# Patient Record
Sex: FEMALE | Race: WHITE | Employment: UNEMPLOYED | URBAN - METROPOLITAN AREA
[De-identification: names, ages, dates, MRNs, and addresses within clinical notes are randomized per-mention and may not be internally consistent; named-entity substitution may affect disease eponyms.]

---

## 2017-09-13 ENCOUNTER — TELEPHONE (OUTPATIENT)
Dept: FAMILY MEDICINE CLINIC | Facility: CLINIC | Age: 62
End: 2017-09-13

## 2017-09-13 NOTE — TELEPHONE ENCOUNTER
Received fax from Dr Jacobs Foil office requesting a Pre-Op. Pt having R Internal Ptosis Repair on 9/22/17. .. Lorin Tsai  pt has scheduled her appt w/Dr Cheng Corporal on 9/18/17 (EKG requested) Orders in [address] [place] [address]

## 2017-09-15 ENCOUNTER — TELEPHONE (OUTPATIENT)
Dept: FAMILY MEDICINE CLINIC | Facility: CLINIC | Age: 62
End: 2017-09-15

## 2017-09-15 NOTE — TELEPHONE ENCOUNTER
UMAIR for patient to confirm her appt with Dr. Colton Peterson on 9/18/17 at 10 am for her Pre-op Physical/New Patient.

## 2017-09-18 ENCOUNTER — OFFICE VISIT (OUTPATIENT)
Dept: FAMILY MEDICINE CLINIC | Facility: CLINIC | Age: 62
End: 2017-09-18

## 2017-09-18 VITALS
OXYGEN SATURATION: 99 % | RESPIRATION RATE: 16 BRPM | TEMPERATURE: 99 F | DIASTOLIC BLOOD PRESSURE: 62 MMHG | HEIGHT: 68.75 IN | SYSTOLIC BLOOD PRESSURE: 120 MMHG | WEIGHT: 152 LBS | BODY MASS INDEX: 22.51 KG/M2 | HEART RATE: 74 BPM

## 2017-09-18 DIAGNOSIS — I47.1 PAROXYSMAL SVT (SUPRAVENTRICULAR TACHYCARDIA) (HCC): ICD-10-CM

## 2017-09-18 DIAGNOSIS — Z01.818 PREOP EXAMINATION: ICD-10-CM

## 2017-09-18 DIAGNOSIS — I10 ESSENTIAL HYPERTENSION: ICD-10-CM

## 2017-09-18 DIAGNOSIS — G62.9 NEUROPATHY: ICD-10-CM

## 2017-09-18 DIAGNOSIS — E78.2 MIXED HYPERLIPIDEMIA: ICD-10-CM

## 2017-09-18 DIAGNOSIS — H02.401 PTOSIS OF RIGHT EYELID: Primary | ICD-10-CM

## 2017-09-18 PROCEDURE — 93000 ELECTROCARDIOGRAM COMPLETE: CPT | Performed by: FAMILY MEDICINE

## 2017-09-18 PROCEDURE — 99243 OFF/OP CNSLTJ NEW/EST LOW 30: CPT | Performed by: FAMILY MEDICINE

## 2017-09-18 RX ORDER — DILTIAZEM HYDROCHLORIDE 180 MG/1
180 CAPSULE, COATED, EXTENDED RELEASE ORAL 2 TIMES DAILY
Refills: 3 | COMMUNITY
Start: 2017-09-10 | End: 2018-07-02

## 2017-09-18 RX ORDER — MELATONIN
2 2 TIMES DAILY
COMMUNITY
End: 2018-07-02 | Stop reason: ALTCHOICE

## 2017-09-18 RX ORDER — LORATADINE 10 MG/1
10 TABLET ORAL DAILY PRN
COMMUNITY
End: 2019-03-21

## 2017-09-18 RX ORDER — CITALOPRAM 20 MG/1
20 TABLET ORAL DAILY
Refills: 0 | COMMUNITY
Start: 2017-09-10 | End: 2017-12-26

## 2017-09-18 RX ORDER — DOXEPIN HYDROCHLORIDE 50 MG/1
1 CAPSULE ORAL DAILY
COMMUNITY

## 2017-09-18 RX ORDER — VALSARTAN 160 MG/1
160 TABLET ORAL DAILY
Refills: 2 | COMMUNITY
Start: 2017-09-10 | End: 2018-07-02

## 2017-09-18 RX ORDER — CHOLECALCIFEROL (VITAMIN D3) 125 MCG
CAPSULE ORAL
COMMUNITY
End: 2019-01-03

## 2017-09-18 NOTE — PROGRESS NOTES
Payor: SHIRLEY BAILEY PPO / Plan: SHIRLEY PPO / Product Type: PPO /     Subjective:  HPI:  64year old female who has a past medical history of HLD (hyperlipidemia); HTN (hypertension); Neuropathy ([facility] [address] and Paroxysmal SVT (supraventricular tachycardia) ([facility] [address]  oliver no  14. Do you suffer from asthma? no  15. Do you have diabetes that requires insulin? no  16. Do you have diabetes that requires tablets only? no  17.  Do you suffer from bronchitis? no    Functional status:   Can do heavy work around the house such as scr 9/18/1987  Smokeless tobacco: Never Used                      Alcohol use: Yes           12.0 oz/week     Standard drinks or equivalent: 10, Glasses of wine: 10 per week    OBJECTIVE:     09/18/17  1009   BP: 120/62   Pulse: 74   Resp: 16   Temp: 99 °F (37 status this is likely an acceptable risk. If the surgical team believes the anticipated benefit of the procedure outweighs the risks, then surgery may proceed with our above medical recommendations. Patient is an acceptable surgical candidate.     Rose Dixon

## 2017-10-26 ENCOUNTER — OFFICE VISIT (OUTPATIENT)
Dept: FAMILY MEDICINE CLINIC | Facility: CLINIC | Age: 62
End: 2017-10-26

## 2017-10-26 VITALS
TEMPERATURE: 99 F | RESPIRATION RATE: 16 BRPM | WEIGHT: 154.38 LBS | HEIGHT: 68.6 IN | BODY MASS INDEX: 23.13 KG/M2 | DIASTOLIC BLOOD PRESSURE: 60 MMHG | SYSTOLIC BLOOD PRESSURE: 122 MMHG | HEART RATE: 81 BPM

## 2017-10-26 DIAGNOSIS — Z11.51 SCREENING FOR HPV (HUMAN PAPILLOMAVIRUS): ICD-10-CM

## 2017-10-26 DIAGNOSIS — Z01.419 WELL WOMAN EXAM WITH ROUTINE GYNECOLOGICAL EXAM: Primary | ICD-10-CM

## 2017-10-26 DIAGNOSIS — Z23 NEED FOR VACCINATION: ICD-10-CM

## 2017-10-26 DIAGNOSIS — Z12.31 VISIT FOR SCREENING MAMMOGRAM: ICD-10-CM

## 2017-10-26 DIAGNOSIS — Z11.59 ENCOUNTER FOR HEPATITIS C SCREENING TEST FOR LOW RISK PATIENT: ICD-10-CM

## 2017-10-26 DIAGNOSIS — Z12.4 CERVICAL CANCER SCREENING: ICD-10-CM

## 2017-10-26 PROCEDURE — 88175 CYTOPATH C/V AUTO FLUID REDO: CPT | Performed by: FAMILY MEDICINE

## 2017-10-26 PROCEDURE — 87625 HPV TYPES 16 & 18 ONLY: CPT | Performed by: FAMILY MEDICINE

## 2017-10-26 PROCEDURE — 90686 IIV4 VACC NO PRSV 0.5 ML IM: CPT | Performed by: FAMILY MEDICINE

## 2017-10-26 PROCEDURE — 90471 IMMUNIZATION ADMIN: CPT | Performed by: FAMILY MEDICINE

## 2017-10-26 PROCEDURE — 87624 HPV HI-RISK TYP POOLED RSLT: CPT | Performed by: FAMILY MEDICINE

## 2017-10-26 PROCEDURE — 99396 PREV VISIT EST AGE 40-64: CPT | Performed by: FAMILY MEDICINE

## 2017-10-26 NOTE — PROGRESS NOTES
SUBJECTIVE:  Patient presents with:  Physical: WWE with Pap   Imm/Inj: woulf like Flu shot    HPI:  No concerns today    Health Maintenance:  Vaccines: reviewed as below. Indicated today: Influenza, Tdap  Had zostavax last year  Obesity screening:  Body m Swelling    Comment:Other reaction(s): Other (See Comments)             Severe joint pain             Other reaction(s): Swelling  Beta Adrenergic Blo*    Hives    Comment:Atenolo, Toprol.   Lisinopril              Coughing  Penicillins             Hives  D DIFFERENTIAL    COMP METABOLIC PANEL (14)    LIPID PANEL    THINPREP PAP SMEAR B    HPV HIGH RISK , THIN PREP COLLECTION    Need for vaccination        Relevant Orders    IMMUNIZATION ADMINISTRATION    TETANUS, DIPHTHERIA TOXOIDS AND ACELLULAR PERTUSIS VAC

## 2017-10-30 DIAGNOSIS — R87.610 ASCUS WITH POSITIVE HIGH RISK HPV CERVICAL: Primary | ICD-10-CM

## 2017-10-30 DIAGNOSIS — R87.810 ASCUS WITH POSITIVE HIGH RISK HPV CERVICAL: Primary | ICD-10-CM

## 2017-10-31 ENCOUNTER — TELEPHONE (OUTPATIENT)
Dept: OBGYN CLINIC | Facility: CLINIC | Age: 62
End: 2017-10-31

## 2017-11-01 NOTE — TELEPHONE ENCOUNTER
Spoke with Pt and she did not originally call our office.   She was unaware of the abnormal Pap    I advised Pt to contact Primary for Pap Results and then give our office a call back

## 2017-11-07 ENCOUNTER — TELEPHONE (OUTPATIENT)
Dept: FAMILY MEDICINE CLINIC | Facility: CLINIC | Age: 62
End: 2017-11-07

## 2017-11-07 NOTE — TELEPHONE ENCOUNTER
Pt requesting to order her Mammogram but she usually gets a 3 D Mammogram.Can we re-enter?  Contact pt when done

## 2017-11-09 ENCOUNTER — TELEPHONE (OUTPATIENT)
Dept: FAMILY MEDICINE CLINIC | Facility: CLINIC | Age: 62
End: 2017-11-09

## 2017-11-09 DIAGNOSIS — R92.2 DENSE BREAST TISSUE: Primary | ICD-10-CM

## 2017-11-09 DIAGNOSIS — Z12.31 SCREENING MAMMOGRAM, ENCOUNTER FOR: ICD-10-CM

## 2017-11-09 NOTE — TELEPHONE ENCOUNTER
Patient is calling in she said that she also needs the 3D mammogram ordered. Her previous doctor told her that, she is new to the area. Please place orders or call her back with questions.

## 2017-11-10 NOTE — TELEPHONE ENCOUNTER
Please let patient know this may not be covered by insurance. Order placed. Please let me know if you have any questions.   Isra Galo DO 11/10/2017 6:23 AM

## 2017-11-14 ENCOUNTER — HOSPITAL ENCOUNTER (OUTPATIENT)
Dept: MAMMOGRAPHY | Facility: HOSPITAL | Age: 62
Discharge: HOME | End: 2017-11-14
Attending: FAMILY MEDICINE
Payer: COMMERCIAL

## 2017-11-14 DIAGNOSIS — R92.2 DENSE BREAST TISSUE: ICD-10-CM

## 2017-11-14 DIAGNOSIS — Z12.31 SCREENING MAMMOGRAM, ENCOUNTER FOR: ICD-10-CM

## 2017-11-14 PROCEDURE — 77063 BREAST TOMOSYNTHESIS BI: CPT | Performed by: FAMILY MEDICINE

## 2017-11-14 PROCEDURE — 77067 SCR MAMMO BI INCL CAD: CPT | Performed by: FAMILY MEDICINE

## 2017-11-15 ENCOUNTER — APPOINTMENT (OUTPATIENT)
Dept: LAB | Facility: HOSPITAL | Age: 62
End: 2017-11-15
Attending: FAMILY MEDICINE
Payer: COMMERCIAL

## 2017-11-15 DIAGNOSIS — Z11.59 ENCOUNTER FOR HEPATITIS C SCREENING TEST FOR LOW RISK PATIENT: ICD-10-CM

## 2017-11-15 DIAGNOSIS — Z01.419 WELL WOMAN EXAM WITH ROUTINE GYNECOLOGICAL EXAM: ICD-10-CM

## 2017-11-15 PROCEDURE — 86803 HEPATITIS C AB TEST: CPT

## 2017-11-15 PROCEDURE — 80061 LIPID PANEL: CPT

## 2017-11-15 PROCEDURE — 36415 COLL VENOUS BLD VENIPUNCTURE: CPT

## 2017-11-15 PROCEDURE — 80053 COMPREHEN METABOLIC PANEL: CPT

## 2017-11-15 PROCEDURE — 85027 COMPLETE CBC AUTOMATED: CPT

## 2017-11-17 ENCOUNTER — OFFICE VISIT (OUTPATIENT)
Dept: OBGYN CLINIC | Facility: CLINIC | Age: 62
End: 2017-11-17

## 2017-11-17 VITALS
HEIGHT: 69 IN | SYSTOLIC BLOOD PRESSURE: 134 MMHG | WEIGHT: 154 LBS | DIASTOLIC BLOOD PRESSURE: 72 MMHG | HEART RATE: 104 BPM | BODY MASS INDEX: 22.81 KG/M2

## 2017-11-17 DIAGNOSIS — R87.810 ASCUS WITH POSITIVE HIGH RISK HPV CERVICAL: Primary | ICD-10-CM

## 2017-11-17 DIAGNOSIS — R87.610 ASCUS WITH POSITIVE HIGH RISK HPV CERVICAL: Primary | ICD-10-CM

## 2017-11-17 PROCEDURE — 88342 IMHCHEM/IMCYTCHM 1ST ANTB: CPT | Performed by: OBSTETRICS & GYNECOLOGY

## 2017-11-17 PROCEDURE — 88305 TISSUE EXAM BY PATHOLOGIST: CPT | Performed by: OBSTETRICS & GYNECOLOGY

## 2017-11-17 PROCEDURE — 57454 BX/CURETT OF CERVIX W/SCOPE: CPT | Performed by: OBSTETRICS & GYNECOLOGY

## 2017-11-17 NOTE — PROGRESS NOTES
Patient presents for scheduled colposcopy 2/2 ASCUS with positive HPV noted on pap smear on 10/26/17. The patient reports a history of abnormal pap smear 20 years ago abnormal but EMB normal per patient.  Discussion held with the patient regarding pap smear

## 2017-11-17 NOTE — PATIENT INSTRUCTIONS
[facility] Department of OB/GYN  After Care Instructions for Colposcopy/Biopsy      Biopsy Results   You will receive a phone call with your biopsy results in 7 business days. If you have not received your biopsy results in 7 days, please contact our office.   Jennifer Moore

## 2017-11-19 PROBLEM — R87.810 ASCUS WITH POSITIVE HIGH RISK HPV CERVICAL: Status: ACTIVE | Noted: 2017-11-19

## 2017-11-19 PROBLEM — R87.610 ASCUS WITH POSITIVE HIGH RISK HPV CERVICAL: Status: ACTIVE | Noted: 2017-11-19

## 2017-11-19 NOTE — PROCEDURES
Colposcopy Procedure Note    Indications: 58year old y/o female with ASCUS, HPV positive on pap smear 10/26/17    Pre-procedure diagnosis:   ASCUS, HPV positive     Post-procedure diagnosis:  same    Procedure:  Colposcopy   Cervical Biopsy   ECC     Proc

## 2017-11-21 ENCOUNTER — MED REC SCAN ONLY (OUTPATIENT)
Dept: OBGYN CLINIC | Facility: CLINIC | Age: 62
End: 2017-11-21

## 2017-11-21 ENCOUNTER — TELEPHONE (OUTPATIENT)
Dept: FAMILY MEDICINE CLINIC | Facility: CLINIC | Age: 62
End: 2017-11-21

## 2017-11-21 NOTE — TELEPHONE ENCOUNTER
Left message at home number for patient to call back. We have been trying to contact her regarding her results - 3 tries letter was mailed.

## 2017-11-22 ENCOUNTER — TELEPHONE (OUTPATIENT)
Dept: OBGYN CLINIC | Facility: CLINIC | Age: 62
End: 2017-11-22

## 2017-11-22 ENCOUNTER — OFFICE VISIT (OUTPATIENT)
Dept: OBGYN CLINIC | Facility: CLINIC | Age: 62
End: 2017-11-22

## 2017-11-22 VITALS
DIASTOLIC BLOOD PRESSURE: 72 MMHG | SYSTOLIC BLOOD PRESSURE: 126 MMHG | HEIGHT: 69 IN | WEIGHT: 154 LBS | BODY MASS INDEX: 22.81 KG/M2

## 2017-11-22 DIAGNOSIS — R87.810 ASCUS WITH POSITIVE HIGH RISK HPV CERVICAL: Primary | ICD-10-CM

## 2017-11-22 DIAGNOSIS — R87.610 ASCUS WITH POSITIVE HIGH RISK HPV CERVICAL: Primary | ICD-10-CM

## 2017-11-22 PROCEDURE — 99213 OFFICE O/P EST LOW 20 MIN: CPT | Performed by: OBSTETRICS & GYNECOLOGY

## 2017-11-22 NOTE — PATIENT INSTRUCTIONS
Please return in one year for your annual gyne visit or sooner if having abnormal vaginal bleeding or severe pelvic pain

## 2017-11-22 NOTE — PROGRESS NOTES
[facility]  Obstetrics and Gynecology  Follow Up Progress Note    Subjective:      Maximiliano Lucio is a 58year old [facility] female who was last seen in office on 11/17/17 and now s/p colposcopy with ECC and cervical biopsy 2/2 ASCUS with positiv possible recommendation for repeat pap smear with co-testing in year or possible LEEP/exicision procedure   - advised noting in vagina for 1 week, may resume intercourse in 2 weeks   Health maintenance   - pt seen by her PCP   - lab results reviewed with dakota

## 2017-11-23 NOTE — TELEPHONE ENCOUNTER
Patient seen in office for follow up and pathology not yet reported  Pathology now resulted as BITA I follow colposcopy procedure   Patient called and notified of results.    Recommend to return to the office in 1 year for follow up with repeat pap smear and

## 2017-12-01 ENCOUNTER — HOSPITAL ENCOUNTER (OUTPATIENT)
Dept: MAMMOGRAPHY | Facility: HOSPITAL | Age: 62
Discharge: HOME | End: 2017-12-01
Attending: FAMILY MEDICINE
Payer: COMMERCIAL

## 2017-12-01 DIAGNOSIS — R92.8 ABNORMAL MAMMOGRAM OF LEFT BREAST: ICD-10-CM

## 2017-12-01 PROCEDURE — 77061 BREAST TOMOSYNTHESIS UNI: CPT | Performed by: FAMILY MEDICINE

## 2017-12-01 PROCEDURE — 77065 DX MAMMO INCL CAD UNI: CPT | Performed by: FAMILY MEDICINE

## 2017-12-01 PROCEDURE — 76641 ULTRASOUND BREAST COMPLETE: CPT | Performed by: FAMILY MEDICINE

## 2017-12-26 NOTE — TELEPHONE ENCOUNTER
LOV 10/26/2017     No future appointments. Refill request for:      Pending Prescriptions Disp Refills    CITALOPRAM HYDROBROMIDE 20 MG Oral Tab [Pharmacy Med Name: CITALOPRAM 20MG TABLETS] 90 tablet 0     Sig: TAKE 1 TABLET BY MOUTH DAILY.  NEEDS NEW P

## 2017-12-27 RX ORDER — CITALOPRAM 20 MG/1
20 TABLET ORAL DAILY
Qty: 90 TABLET | Refills: 1 | Status: SHIPPED | OUTPATIENT
Start: 2017-12-27 | End: 2018-07-02

## 2018-06-05 ENCOUNTER — CHARTING TRANS (OUTPATIENT)
Dept: OTHER | Age: 63
End: 2018-06-05

## 2018-06-22 NOTE — TELEPHONE ENCOUNTER
Failed protocol checks LOV 10/26/2017 with Dr Olmos Likes was to return in 6 months for recheck will have front staff call for an appointment

## 2018-06-29 RX ORDER — DILTIAZEM HYDROCHLORIDE 180 MG/1
CAPSULE, COATED, EXTENDED RELEASE ORAL
Qty: 180 CAPSULE | Refills: 0 | OUTPATIENT
Start: 2018-06-29

## 2018-06-29 RX ORDER — CITALOPRAM 20 MG/1
TABLET ORAL
Qty: 90 TABLET | Refills: 0 | OUTPATIENT
Start: 2018-06-29

## 2018-06-29 RX ORDER — VALSARTAN 160 MG/1
TABLET ORAL
Qty: 90 TABLET | Refills: 0 | OUTPATIENT
Start: 2018-06-29

## 2018-06-29 NOTE — TELEPHONE ENCOUNTER
Patient seems to have moved to [facility], refills will be denied   Seeing a cardiologist in [facility] June 2018

## 2018-07-02 ENCOUNTER — OFFICE VISIT (OUTPATIENT)
Dept: FAMILY MEDICINE CLINIC | Facility: CLINIC | Age: 63
End: 2018-07-02

## 2018-07-02 VITALS
HEIGHT: 69 IN | TEMPERATURE: 99 F | SYSTOLIC BLOOD PRESSURE: 138 MMHG | HEART RATE: 64 BPM | DIASTOLIC BLOOD PRESSURE: 80 MMHG | RESPIRATION RATE: 12 BRPM | BODY MASS INDEX: 23.4 KG/M2 | WEIGHT: 158 LBS

## 2018-07-02 DIAGNOSIS — Z00.00 ROUTINE HEALTH MAINTENANCE: ICD-10-CM

## 2018-07-02 DIAGNOSIS — I10 ESSENTIAL HYPERTENSION: ICD-10-CM

## 2018-07-02 DIAGNOSIS — I47.1 PAROXYSMAL SVT (SUPRAVENTRICULAR TACHYCARDIA) (HCC): Primary | ICD-10-CM

## 2018-07-02 DIAGNOSIS — N95.1 MENOPAUSAL AND FEMALE CLIMACTERIC STATES: ICD-10-CM

## 2018-07-02 PROCEDURE — 99214 OFFICE O/P EST MOD 30 MIN: CPT | Performed by: FAMILY MEDICINE

## 2018-07-02 RX ORDER — VALSARTAN 160 MG/1
160 TABLET ORAL DAILY
Qty: 90 TABLET | Refills: 1 | Status: SHIPPED | OUTPATIENT
Start: 2018-07-02 | End: 2018-08-14

## 2018-07-02 RX ORDER — CITALOPRAM 20 MG/1
20 TABLET ORAL DAILY
Qty: 90 TABLET | Refills: 1 | Status: SHIPPED | OUTPATIENT
Start: 2018-07-02 | End: 2019-01-07

## 2018-07-02 RX ORDER — DILTIAZEM HYDROCHLORIDE 180 MG/1
180 CAPSULE, COATED, EXTENDED RELEASE ORAL 2 TIMES DAILY
Qty: 180 CAPSULE | Refills: 1 | Status: SHIPPED | OUTPATIENT
Start: 2018-07-02 | End: 2019-01-02

## 2018-07-02 NOTE — PROGRESS NOTES
Chief Complaint:  Patient presents with:  Medication Follow-Up    HPI:  This is a 58year old female patient presenting for Medication Follow-Up    HTN: Was on atenolol and metoprolol developed hives  Now on valsartan and diltiazem  Also notes a history of drinks or equivalent: 10, Glasses of wine: 10 per week         Current Outpatient Prescriptions:  DilTIAZem HCl ER Coated Beads 180 MG Oral Capsule SR 24 Hr Take 1 capsule (180 mg total) by mouth 2 (two) times daily.  Disp: 180 capsule Rfl: 1   valsartan 16 This Visit        Cardiovascular    HTN (hypertension)    Relevant Medications    DilTIAZem HCl ER Coated Beads 180 MG Oral Capsule SR 24 Hr    valsartan 160 MG Oral Tab    Paroxysmal SVT (supraventricular tachycardia) (HCC) - Primary    Relevant Medicatio

## 2018-07-03 ENCOUNTER — APPOINTMENT (OUTPATIENT)
Dept: LAB | Facility: HOSPITAL | Age: 63
End: 2018-07-03
Attending: FAMILY MEDICINE
Payer: COMMERCIAL

## 2018-07-03 DIAGNOSIS — Z00.00 ROUTINE HEALTH MAINTENANCE: ICD-10-CM

## 2018-07-03 LAB
ALBUMIN SERPL-MCNC: 3.8 G/DL (ref 3.5–4.8)
ALP LIVER SERPL-CCNC: 96 U/L (ref 50–130)
ALT SERPL-CCNC: 24 U/L (ref 14–54)
AST SERPL-CCNC: 16 U/L (ref 15–41)
BILIRUB SERPL-MCNC: 0.5 MG/DL (ref 0.1–2)
BUN BLD-MCNC: 14 MG/DL (ref 8–20)
CALCIUM BLD-MCNC: 9.1 MG/DL (ref 8.3–10.3)
CHLORIDE: 109 MMOL/L (ref 101–111)
CHOLEST SMN-MCNC: 204 MG/DL (ref ?–200)
CO2: 25 MMOL/L (ref 22–32)
CREAT BLD-MCNC: 0.81 MG/DL (ref 0.55–1.02)
GLUCOSE BLD-MCNC: 90 MG/DL (ref 70–99)
HDLC SERPL-MCNC: 72 MG/DL (ref 45–?)
HDLC SERPL: 2.83 {RATIO} (ref ?–4.44)
LDLC SERPL CALC-MCNC: 120 MG/DL (ref ?–130)
M PROTEIN MFR SERPL ELPH: 7.5 G/DL (ref 6.1–8.3)
NONHDLC SERPL-MCNC: 132 MG/DL (ref ?–130)
POTASSIUM SERPL-SCNC: 4.4 MMOL/L (ref 3.6–5.1)
SODIUM SERPL-SCNC: 140 MMOL/L (ref 136–144)
TRIGL SERPL-MCNC: 58 MG/DL (ref ?–150)
VLDLC SERPL CALC-MCNC: 12 MG/DL (ref 5–40)

## 2018-07-03 PROCEDURE — 36415 COLL VENOUS BLD VENIPUNCTURE: CPT

## 2018-07-03 PROCEDURE — 80061 LIPID PANEL: CPT

## 2018-07-03 PROCEDURE — 80053 COMPREHEN METABOLIC PANEL: CPT

## 2018-07-30 ENCOUNTER — TELEPHONE (OUTPATIENT)
Dept: FAMILY MEDICINE CLINIC | Facility: CLINIC | Age: 63
End: 2018-07-30

## 2018-07-30 DIAGNOSIS — IMO0002 ANTIBIOTIC PROPHYLAXIS FOR DENTAL PROCEDURE INDICATED DUE TO PRIOR JOINT REPLACEMENT: ICD-10-CM

## 2018-07-30 DIAGNOSIS — IMO0002 ANTIBIOTIC PROPHYLAXIS FOR DENTAL PROCEDURE INDICATED DUE TO PRIOR JOINT REPLACEMENT: Primary | ICD-10-CM

## 2018-07-30 RX ORDER — CLINDAMYCIN HYDROCHLORIDE 300 MG/1
300 CAPSULE ORAL 3 TIMES DAILY
Qty: 1 CAPSULE | Refills: 0 | Status: SHIPPED | OUTPATIENT
Start: 2018-07-30 | End: 2018-07-31

## 2018-07-30 NOTE — TELEPHONE ENCOUNTER
Pt is going to dentist today and needs Clindamycin 300mg rx placed to pharmacy to take 1 hour prior to appoinment. Please place rx to [facility] in [place] [address].   Pt can be reached at [phone] Routed to Dr Stephanie Griffin

## 2018-07-30 NOTE — TELEPHONE ENCOUNTER
Please see other telephone note as well. Pt called requesting we fill her prophylactic clindamycin prior to her upcoming dental procedure today. Pharmacy returned call stating that she takes 600mg not 300mg.  This was verbally given as an order over the michele

## 2018-07-31 RX ORDER — CLINDAMYCIN HYDROCHLORIDE 300 MG/1
600 CAPSULE ORAL 3 TIMES DAILY
Qty: 2 CAPSULE | Refills: 0 | COMMUNITY
Start: 2018-07-31 | End: 2019-01-03

## 2018-07-31 NOTE — TELEPHONE ENCOUNTER
Patient reports she is taking prophylactic clinda for her heart murmur. Patient states she asked her cardiologist about a year ago whether she still needed to take it and he told her \"absolutely. \"

## 2018-08-14 ENCOUNTER — TELEPHONE (OUTPATIENT)
Dept: FAMILY MEDICINE CLINIC | Facility: CLINIC | Age: 63
End: 2018-08-14

## 2018-08-14 DIAGNOSIS — I10 ESSENTIAL HYPERTENSION: Primary | ICD-10-CM

## 2018-08-14 RX ORDER — LOSARTAN POTASSIUM 100 MG/1
100 TABLET ORAL DAILY
Qty: 90 TABLET | Refills: 1 | Status: SHIPPED | OUTPATIENT
Start: 2018-08-14 | End: 2019-01-02

## 2018-08-14 NOTE — TELEPHONE ENCOUNTER
Fax received from pharmacy stating that pt is currently taking Valsartan 160 mg daily. There is a 's recall on Valsartan. LOV 7/2/18.  Future Appointments  Date Time Provider Prema Estrella   9/14/2018 10:00 AM Michaela Henderson MD [facility]

## 2018-10-25 ENCOUNTER — OFFICE VISIT (OUTPATIENT)
Dept: OBGYN CLINIC | Facility: CLINIC | Age: 63
End: 2018-10-25
Payer: COMMERCIAL

## 2018-10-25 VITALS
DIASTOLIC BLOOD PRESSURE: 64 MMHG | HEIGHT: 69 IN | WEIGHT: 161 LBS | SYSTOLIC BLOOD PRESSURE: 122 MMHG | BODY MASS INDEX: 23.85 KG/M2

## 2018-10-25 DIAGNOSIS — N87.0 CIN I (CERVICAL INTRAEPITHELIAL NEOPLASIA I): ICD-10-CM

## 2018-10-25 DIAGNOSIS — Z12.39 BREAST CANCER SCREENING: ICD-10-CM

## 2018-10-25 DIAGNOSIS — R87.810 ASCUS WITH POSITIVE HIGH RISK HPV CERVICAL: ICD-10-CM

## 2018-10-25 DIAGNOSIS — R87.610 ASCUS WITH POSITIVE HIGH RISK HPV CERVICAL: ICD-10-CM

## 2018-10-25 DIAGNOSIS — Z01.419 WELL WOMAN EXAM WITH ROUTINE GYNECOLOGICAL EXAM: Primary | ICD-10-CM

## 2018-10-25 DIAGNOSIS — Z12.4 CERVICAL CANCER SCREENING: ICD-10-CM

## 2018-10-25 PROCEDURE — 99396 PREV VISIT EST AGE 40-64: CPT | Performed by: OBSTETRICS & GYNECOLOGY

## 2018-10-25 PROCEDURE — 87624 HPV HI-RISK TYP POOLED RSLT: CPT | Performed by: OBSTETRICS & GYNECOLOGY

## 2018-10-25 NOTE — PROGRESS NOTES
[facility]  Obstetrics and Gynecology  History & Physical    CC: Patient is here for annual well woman exam     Subjective:     HPI: Cassie Connolly is a 61[facility] here for annual well women exam.  Patient reports denies PMB or HIVES    Comment:Atenolo, Toprol. Lisinopril              Coughing  Penicillins             HIVES  Duloxetine Hcl          PALPITATIONS    Comment:Other reaction(s): Other (See Comments)  Simvastatin                 Comment:Other reaction(s):  Altered Ment wine, 10 Standard drinks or equivalent per week      Drug use: No      Sexual activity: Yes        Partners: Male    Other Topics      Concerns:         Service: Not Asked        Blood Transfusions: Not Asked        Caffeine Concern: No        Occu normal appearing vagina with normal color and discharge, no lesions  CERVIX: normal appearing cervix without discharge or lesions  UTERUS: uterus is normal size, shape, consistency and nontender  ADNEXA: normal adnexa in size, nontender and no masses  Ext:

## 2018-10-31 NOTE — PROGRESS NOTES
Hx of BITA I in 2017  Please inform patient of normal repeat pap smear with negative HPV  Advised to f/u in 1 year

## 2018-11-16 PROBLEM — M65.342 TRIGGER RING FINGER OF LEFT HAND: Status: ACTIVE | Noted: 2018-11-16

## 2018-12-04 ENCOUNTER — HOSPITAL ENCOUNTER (OUTPATIENT)
Dept: MAMMOGRAPHY | Facility: HOSPITAL | Age: 63
Discharge: HOME | End: 2018-12-04
Attending: OBSTETRICS & GYNECOLOGY
Payer: COMMERCIAL

## 2018-12-04 DIAGNOSIS — Z12.39 BREAST CANCER SCREENING: ICD-10-CM

## 2018-12-04 PROCEDURE — 77067 SCR MAMMO BI INCL CAD: CPT | Performed by: OBSTETRICS & GYNECOLOGY

## 2018-12-04 PROCEDURE — 77063 BREAST TOMOSYNTHESIS BI: CPT | Performed by: OBSTETRICS & GYNECOLOGY

## 2018-12-26 RX ORDER — CITALOPRAM 20 MG/1
TABLET ORAL
Qty: 90 TABLET | Refills: 0 | Status: SHIPPED | OUTPATIENT
Start: 2018-12-26 | End: 2019-01-07

## 2018-12-26 NOTE — TELEPHONE ENCOUNTER
LOV 7/2/2018     Future Appointments   Date Time Provider Prema Delores   1/7/2019  8:00 AM Daniele Ngo DO [facility]   3/15/2019  8:40 AM MD NABOR Georges   10/3/2019  9:00 AM Gumaro Mac MD [facility]

## 2018-12-29 DIAGNOSIS — N95.1 MENOPAUSAL AND FEMALE CLIMACTERIC STATES: ICD-10-CM

## 2019-01-02 DIAGNOSIS — I10 ESSENTIAL HYPERTENSION: ICD-10-CM

## 2019-01-02 DIAGNOSIS — I47.10 PAROXYSMAL SVT (SUPRAVENTRICULAR TACHYCARDIA): ICD-10-CM

## 2019-01-02 RX ORDER — LOSARTAN POTASSIUM 100 MG/1
TABLET ORAL
Qty: 90 TABLET | Refills: 0 | Status: CANCELLED | OUTPATIENT
Start: 2019-01-02

## 2019-01-02 RX ORDER — DILTIAZEM HYDROCHLORIDE 180 MG/1
CAPSULE, EXTENDED RELEASE ORAL
Qty: 180 CAPSULE | Refills: 0 | Status: CANCELLED | OUTPATIENT
Start: 2019-01-02

## 2019-01-03 RX ORDER — APRACLONIDINE HYDROCHLORIDE 5 MG/ML
SOLUTION/ DROPS OPHTHALMIC
Refills: 0 | COMMUNITY
Start: 2019-01-03 | End: 2019-03-21

## 2019-01-04 RX ORDER — CITALOPRAM 20 MG/1
TABLET ORAL
Qty: 90 TABLET | Refills: 0 | OUTPATIENT
Start: 2019-01-04

## 2019-01-07 ENCOUNTER — OFFICE VISIT (OUTPATIENT)
Dept: FAMILY MEDICINE CLINIC | Facility: CLINIC | Age: 64
End: 2019-01-07
Payer: COMMERCIAL

## 2019-01-07 VITALS
DIASTOLIC BLOOD PRESSURE: 60 MMHG | HEART RATE: 76 BPM | BODY MASS INDEX: 23.7 KG/M2 | RESPIRATION RATE: 16 BRPM | TEMPERATURE: 98 F | SYSTOLIC BLOOD PRESSURE: 108 MMHG | WEIGHT: 160 LBS | HEIGHT: 69 IN

## 2019-01-07 DIAGNOSIS — N95.1 MENOPAUSAL AND FEMALE CLIMACTERIC STATES: Primary | ICD-10-CM

## 2019-01-07 DIAGNOSIS — Z12.11 SCREEN FOR COLON CANCER: ICD-10-CM

## 2019-01-07 PROCEDURE — 99213 OFFICE O/P EST LOW 20 MIN: CPT | Performed by: FAMILY MEDICINE

## 2019-01-07 RX ORDER — CITALOPRAM 20 MG/1
20 TABLET ORAL DAILY
Qty: 90 TABLET | Refills: 1 | Status: SHIPPED | OUTPATIENT
Start: 2019-01-07 | End: 2019-07-08

## 2019-01-07 NOTE — PROGRESS NOTES
Chief Complaint:  Patient presents with:  Depression: Requesting Citalopram Refills      HPI:  This is a 61year old female patient presenting for Depression (Requesting Citalopram Refills)    Notes that she is following with Dr. Luis Felipe Hernandez for cardiology and no Former Smoker        Quit date: 1987        Years since quittin.3      Smokeless tobacco: Never Used    Alcohol use:  Yes      Alcohol/week: 12.0 oz      Types: 10 Glasses of wine, 10 Standard drinks or equivalent per week      Comment: socially peripheral edema   EXTREMITIES: warm and well perfused  PSYCH: pleasant and cooperative, no obvious depression or anxiety    ASSESSMENT AND PLAN:  Discussed the following assessment   Problem List Items Addressed This Visit     None      Visit Diagnoses

## 2019-03-22 ENCOUNTER — APPOINTMENT (OUTPATIENT)
Dept: LAB | Facility: HOSPITAL | Age: 64
End: 2019-03-22
Attending: INTERNAL MEDICINE
Payer: COMMERCIAL

## 2019-03-22 DIAGNOSIS — R14.0 ABDOMINAL BLOATING: ICD-10-CM

## 2019-03-22 DIAGNOSIS — R10.84 GENERALIZED ABDOMINAL PAIN: ICD-10-CM

## 2019-03-22 DIAGNOSIS — R14.3 FLATULENCE: ICD-10-CM

## 2019-03-22 DIAGNOSIS — R19.7 DIARRHEA, UNSPECIFIED TYPE: ICD-10-CM

## 2019-03-22 LAB — IGA SERPL-MCNC: 235 MG/DL (ref 70–312)

## 2019-03-22 PROCEDURE — 82784 ASSAY IGA/IGD/IGG/IGM EACH: CPT

## 2019-03-22 PROCEDURE — 83516 IMMUNOASSAY NONANTIBODY: CPT

## 2019-03-22 PROCEDURE — 36415 COLL VENOUS BLD VENIPUNCTURE: CPT

## 2019-03-25 LAB
TISSUE TRANSGLUTAMINASE AB,IGA: <1.2 U/ML (ref ?–15)
TISSUE TRANSGLUTAMINASE IGA QUALITATIVE: NEGATIVE

## 2019-03-26 RX ORDER — CITALOPRAM 20 MG/1
TABLET ORAL
Qty: 90 TABLET | Refills: 0 | OUTPATIENT
Start: 2019-03-26

## 2019-04-18 PROBLEM — Z12.11 SPECIAL SCREENING FOR MALIGNANT NEOPLASMS, COLON: Status: ACTIVE | Noted: 2019-04-18

## 2019-04-18 PROBLEM — K63.5 COLON POLYP: Status: ACTIVE | Noted: 2019-04-18

## 2019-06-06 PROBLEM — R14.3 FLATULENCE, ERUCTATION, AND GAS PAIN: Status: ACTIVE | Noted: 2019-06-06

## 2019-06-06 PROBLEM — R11.0 NAUSEA: Status: ACTIVE | Noted: 2019-06-06

## 2019-06-06 PROBLEM — R14.2 FLATULENCE, ERUCTATION, AND GAS PAIN: Status: ACTIVE | Noted: 2019-06-06

## 2019-06-06 PROBLEM — R10.13 ABDOMINAL PAIN, EPIGASTRIC: Status: ACTIVE | Noted: 2019-06-06

## 2019-06-06 PROBLEM — R14.1 FLATULENCE, ERUCTATION, AND GAS PAIN: Status: ACTIVE | Noted: 2019-06-06

## 2019-07-08 ENCOUNTER — OFFICE VISIT (OUTPATIENT)
Dept: FAMILY MEDICINE CLINIC | Facility: CLINIC | Age: 64
End: 2019-07-08
Payer: COMMERCIAL

## 2019-07-08 VITALS
BODY MASS INDEX: 22.88 KG/M2 | DIASTOLIC BLOOD PRESSURE: 60 MMHG | SYSTOLIC BLOOD PRESSURE: 122 MMHG | TEMPERATURE: 99 F | HEART RATE: 80 BPM | WEIGHT: 158 LBS | HEIGHT: 69.5 IN | RESPIRATION RATE: 16 BRPM

## 2019-07-08 DIAGNOSIS — N95.1 MENOPAUSAL AND FEMALE CLIMACTERIC STATES: Primary | ICD-10-CM

## 2019-07-08 DIAGNOSIS — Z00.00 ROUTINE HEALTH MAINTENANCE: ICD-10-CM

## 2019-07-08 PROCEDURE — 99213 OFFICE O/P EST LOW 20 MIN: CPT | Performed by: FAMILY MEDICINE

## 2019-07-08 RX ORDER — CITALOPRAM 20 MG/1
20 TABLET ORAL DAILY
Qty: 90 TABLET | Refills: 1 | Status: SHIPPED | OUTPATIENT
Start: 2019-07-08 | End: 2019-11-05

## 2019-07-08 NOTE — PROGRESS NOTES
Chief Complaint:  Patient presents with:  Depression: 6 month Follow up on Citalopram      HPI:  This is a 61year old female patient presenting for Depression (6 month Follow up on Citalopram)    HTN, SVT: Follows with Dr Roxana Tirado.   Was on atenolol and metop [facility])    • Sleep apnea    • Wears glasses       Past Surgical History:   Procedure Laterality Date   • COLONOSCOPY  03/09/2009   • COLONOSCOPY     • EGD     • REMOVAL OF COCCYX     • WISDOM TEETH REMOVED        Family History   Problem Relation Age of Onse Comments)  Lisinopril              Coughing  Simvastatin                 Comment:Other reaction(s):  Altered Mental Status    EXAM:   07/08/19  1147   BP: 122/60   Pulse: 80   Resp: 16   Temp: 98.5 °F (36.9 °C)   Weight: 158 lb   Height: 69.5\"     GENERAL:

## 2019-07-26 ENCOUNTER — APPOINTMENT (OUTPATIENT)
Dept: LAB | Age: 64
End: 2019-07-26
Attending: FAMILY MEDICINE
Payer: COMMERCIAL

## 2019-07-26 DIAGNOSIS — Z00.00 ROUTINE HEALTH MAINTENANCE: ICD-10-CM

## 2019-07-26 LAB
ALBUMIN SERPL-MCNC: 3.9 G/DL (ref 3.4–5)
ALBUMIN/GLOB SERPL: 1.1 {RATIO} (ref 1–2)
ALP LIVER SERPL-CCNC: 96 U/L (ref 50–130)
ALT SERPL-CCNC: 26 U/L (ref 13–56)
ANION GAP SERPL CALC-SCNC: 6 MMOL/L (ref 0–18)
AST SERPL-CCNC: 20 U/L (ref 15–37)
BILIRUB SERPL-MCNC: 0.5 MG/DL (ref 0.1–2)
BUN BLD-MCNC: 12 MG/DL (ref 7–18)
BUN/CREAT SERPL: 13.3 (ref 10–20)
CALCIUM BLD-MCNC: 8.9 MG/DL (ref 8.5–10.1)
CHLORIDE SERPL-SCNC: 108 MMOL/L (ref 98–112)
CHOLEST SMN-MCNC: 228 MG/DL (ref ?–200)
CO2 SERPL-SCNC: 26 MMOL/L (ref 21–32)
CREAT BLD-MCNC: 0.9 MG/DL (ref 0.55–1.02)
DEPRECATED RDW RBC AUTO: 47.5 FL (ref 35.1–46.3)
ERYTHROCYTE [DISTWIDTH] IN BLOOD BY AUTOMATED COUNT: 12.9 % (ref 11–15)
GLOBULIN PLAS-MCNC: 3.4 G/DL (ref 2.8–4.4)
GLUCOSE BLD-MCNC: 100 MG/DL (ref 70–99)
HCT VFR BLD AUTO: 38.9 % (ref 35–48)
HDLC SERPL-MCNC: 76 MG/DL (ref 40–59)
HGB BLD-MCNC: 12.8 G/DL (ref 12–16)
LDLC SERPL CALC-MCNC: 141 MG/DL (ref ?–100)
M PROTEIN MFR SERPL ELPH: 7.3 G/DL (ref 6.4–8.2)
MCH RBC QN AUTO: 33.1 PG (ref 26–34)
MCHC RBC AUTO-ENTMCNC: 32.9 G/DL (ref 31–37)
MCV RBC AUTO: 100.5 FL (ref 80–100)
NONHDLC SERPL-MCNC: 152 MG/DL (ref ?–130)
OSMOLALITY SERPL CALC.SUM OF ELEC: 290 MOSM/KG (ref 275–295)
PLATELET # BLD AUTO: 326 10(3)UL (ref 150–450)
POTASSIUM SERPL-SCNC: 4.2 MMOL/L (ref 3.5–5.1)
RBC # BLD AUTO: 3.87 X10(6)UL (ref 3.8–5.3)
SODIUM SERPL-SCNC: 140 MMOL/L (ref 136–145)
TRIGL SERPL-MCNC: 56 MG/DL (ref 30–149)
VLDLC SERPL CALC-MCNC: 11 MG/DL (ref 0–30)
WBC # BLD AUTO: 6.7 X10(3) UL (ref 4–11)

## 2019-07-26 PROCEDURE — 36415 COLL VENOUS BLD VENIPUNCTURE: CPT | Performed by: FAMILY MEDICINE

## 2019-07-26 PROCEDURE — 85027 COMPLETE CBC AUTOMATED: CPT | Performed by: FAMILY MEDICINE

## 2019-07-26 PROCEDURE — 80061 LIPID PANEL: CPT | Performed by: FAMILY MEDICINE

## 2019-07-26 PROCEDURE — 80053 COMPREHEN METABOLIC PANEL: CPT | Performed by: FAMILY MEDICINE

## 2019-07-30 ENCOUNTER — TELEPHONE (OUTPATIENT)
Dept: FAMILY MEDICINE CLINIC | Facility: CLINIC | Age: 64
End: 2019-07-30

## 2019-07-30 DIAGNOSIS — D75.89 MACROCYTOSIS WITHOUT ANEMIA: Primary | ICD-10-CM

## 2019-07-30 NOTE — TELEPHONE ENCOUNTER
Called and discussed this with pateint and she would like to get orders for vitamin levels today because she has met her deductible.

## 2019-07-30 NOTE — TELEPHONE ENCOUNTER
Pt has a question regarding one of the cells being enlarged and taking vitamins. Did Dr. Ernestina Geronimo get her results.

## 2019-08-01 ENCOUNTER — APPOINTMENT (OUTPATIENT)
Dept: LAB | Age: 64
End: 2019-08-01
Attending: FAMILY MEDICINE
Payer: COMMERCIAL

## 2019-08-01 DIAGNOSIS — D75.89 MACROCYTOSIS WITHOUT ANEMIA: ICD-10-CM

## 2019-08-01 LAB
FOLATE SERPL-MCNC: 38.9 NG/ML (ref 8.7–?)
VIT B12 SERPL-MCNC: 956 PG/ML (ref 193–986)

## 2019-08-01 PROCEDURE — 82746 ASSAY OF FOLIC ACID SERUM: CPT | Performed by: FAMILY MEDICINE

## 2019-08-01 PROCEDURE — 82607 VITAMIN B-12: CPT | Performed by: FAMILY MEDICINE

## 2019-08-01 PROCEDURE — 36415 COLL VENOUS BLD VENIPUNCTURE: CPT | Performed by: FAMILY MEDICINE

## 2019-09-22 DIAGNOSIS — N95.1 MENOPAUSAL AND FEMALE CLIMACTERIC STATES: ICD-10-CM

## 2019-09-24 ENCOUNTER — TELEPHONE (OUTPATIENT)
Dept: FAMILY MEDICINE CLINIC | Facility: CLINIC | Age: 64
End: 2019-09-24

## 2019-09-24 DIAGNOSIS — G47.30 SLEEP APNEA IN ADULT: Primary | ICD-10-CM

## 2019-09-24 RX ORDER — CITALOPRAM 20 MG/1
TABLET ORAL
Qty: 90 TABLET | Refills: 0 | OUTPATIENT
Start: 2019-09-24

## 2019-09-24 NOTE — TELEPHONE ENCOUNTER
LOV 7/8/2019     Patient was asked to follow-up in: 6 months    Appointment scheduled: 1/7/2020 Charlette Love DO     Refill request for:    Requested Prescriptions     Pending Prescriptions Disp Refills   • CITALOPRAM HYDROBROMIDE 20 MG Oral Tab [Pharmac

## 2019-09-24 NOTE — TELEPHONE ENCOUNTER
Patient called requesting a sleep study. She does use a CPAP machine. Since she moved here from [facility] she hasn't seen a pulmonologist.   She states she is not sleeping well and is biting her tongue at night. Okay for sleep study?

## 2019-09-25 NOTE — TELEPHONE ENCOUNTER
Patient given number for sleep lab.  She is notified that sleep center will work with her insurance to get approval.  She is also notified that she will have follow-up with pulmonology post test.

## 2019-10-15 ENCOUNTER — OFFICE VISIT (OUTPATIENT)
Dept: SLEEP CENTER | Age: 64
End: 2019-10-15
Attending: INTERNAL MEDICINE
Payer: COMMERCIAL

## 2019-10-15 DIAGNOSIS — G47.30 SLEEP APNEA IN ADULT: ICD-10-CM

## 2019-10-15 PROCEDURE — 95810 POLYSOM 6/> YRS 4/> PARAM: CPT

## 2019-10-19 NOTE — PROCEDURES
[address]       Accredited by the [facility] ([facility])    PATIENT'S NAME:        Mike Salcido PHYSICIAN:   Can Best M.D. REFERRING PHYSICIAN:   DO ABIDA Reyes delayed at 45.7 minutes. Wake after sleep onset was 143 minutes. During sleep, stages 1, 2, and REM sleep were seen. The patient did not have slow-wave sleep. REM sleep occupied 17.6% of total sleep time with a REM latency of 294.2 minutes.   There were you have any questions, please feel free to contact us at [phone]. Dictated By Henrietta Lockett M.D.  d: 10/19/2019 12:55:36  t: 10/19/2019 15:34:20  [facility] [id number]  NS/    cc: Rose Ngo DO

## 2019-11-05 ENCOUNTER — OFFICE VISIT (OUTPATIENT)
Dept: FAMILY MEDICINE CLINIC | Facility: CLINIC | Age: 64
End: 2019-11-05
Payer: COMMERCIAL

## 2019-11-05 VITALS
HEART RATE: 70 BPM | DIASTOLIC BLOOD PRESSURE: 70 MMHG | BODY MASS INDEX: 23.82 KG/M2 | HEIGHT: 68.5 IN | WEIGHT: 159 LBS | RESPIRATION RATE: 16 BRPM | SYSTOLIC BLOOD PRESSURE: 120 MMHG | TEMPERATURE: 98 F

## 2019-11-05 DIAGNOSIS — Z00.00 WELL WOMAN EXAM WITHOUT GYNECOLOGICAL EXAM: Primary | ICD-10-CM

## 2019-11-05 DIAGNOSIS — N95.1 MENOPAUSAL AND FEMALE CLIMACTERIC STATES: ICD-10-CM

## 2019-11-05 DIAGNOSIS — M79.645 FINGER PAIN, LEFT: ICD-10-CM

## 2019-11-05 DIAGNOSIS — G47.33 OSA ON CPAP: ICD-10-CM

## 2019-11-05 DIAGNOSIS — Z99.89 OSA ON CPAP: ICD-10-CM

## 2019-11-05 DIAGNOSIS — Z12.31 VISIT FOR SCREENING MAMMOGRAM: ICD-10-CM

## 2019-11-05 PROCEDURE — 99396 PREV VISIT EST AGE 40-64: CPT | Performed by: FAMILY MEDICINE

## 2019-11-05 RX ORDER — CITALOPRAM 20 MG/1
20 TABLET ORAL DAILY
Qty: 90 TABLET | Refills: 1 | Status: SHIPPED | OUTPATIENT
Start: 2019-11-05 | End: 2020-03-16

## 2019-11-05 NOTE — PROGRESS NOTES
SUBJECTIVE:  Patient presents with:  Physical: WWE no pap-Sees Gyn    HPI:  Wondering if she needs to see gyne. Had abnormal pap in 2017, then normal in 2018. L middle finger with swelling and some tenderness. This just started in last 1-2 weeks.  No History:   Diagnosis Date   • Abdominal distention    • Abdominal pain    • Belching    • Bloating    • Chest pain    • Constipation    • Diarrhea, unspecified    • Essential hypertension    • Flatulence/gas pain/belching    • Heart palpitations    • High 11/05/19  0756   BP: 120/70   Pulse: 70   Resp: 16   Temp: 98.2 °F (36.8 °C)   TempSrc: Oral   Weight: 159 lb (72.1 kg)   Height: 68.5\"     Physical Examination: General appearance - alert, well appearing, and in no distress and normal appearing weight  M Citalopram Hydrobromide 20 MG Oral Tab; Take 1 tablet (20 mg total) by mouth daily. Well woman exam without gynecological exam  Vaccines: Indicated today: UTD  Obesity screening: Body mass index is 23.82 kg/m².  Discussed healthy diet and exercise in det

## 2019-11-06 ENCOUNTER — TELEPHONE (OUTPATIENT)
Dept: FAMILY MEDICINE CLINIC | Facility: CLINIC | Age: 64
End: 2019-11-06

## 2019-11-06 NOTE — TELEPHONE ENCOUNTER
Patient has appt with pulmonologist coming up after recent sleep study wondering how she gets machine and CPAP supplies.    Informed patient she will get supplies from pulmonologist.

## 2019-11-07 ENCOUNTER — HOSPITAL ENCOUNTER (OUTPATIENT)
Dept: GENERAL RADIOLOGY | Facility: HOSPITAL | Age: 64
Discharge: HOME | End: 2019-11-07
Attending: FAMILY MEDICINE
Payer: COMMERCIAL

## 2019-11-07 DIAGNOSIS — M79.645 FINGER PAIN, LEFT: ICD-10-CM

## 2019-11-07 PROCEDURE — 73130 X-RAY EXAM OF HAND: CPT | Performed by: FAMILY MEDICINE

## 2019-11-12 PROBLEM — R87.810 ASCUS WITH POSITIVE HIGH RISK HPV CERVICAL: Status: RESOLVED | Noted: 2017-11-19 | Resolved: 2019-11-12

## 2019-11-12 PROBLEM — R87.610 ASCUS WITH POSITIVE HIGH RISK HPV CERVICAL: Status: RESOLVED | Noted: 2017-11-19 | Resolved: 2019-11-12

## 2019-11-12 PROBLEM — N87.0 CIN I (CERVICAL INTRAEPITHELIAL NEOPLASIA I): Status: RESOLVED | Noted: 2018-10-25 | Resolved: 2019-11-12

## 2019-11-13 ENCOUNTER — OFFICE VISIT (OUTPATIENT)
Dept: OBGYN CLINIC | Facility: CLINIC | Age: 64
End: 2019-11-13
Payer: COMMERCIAL

## 2019-11-13 VITALS
HEIGHT: 68.5 IN | SYSTOLIC BLOOD PRESSURE: 124 MMHG | DIASTOLIC BLOOD PRESSURE: 62 MMHG | BODY MASS INDEX: 23.82 KG/M2 | WEIGHT: 159 LBS

## 2019-11-13 DIAGNOSIS — Z12.4 CERVICAL CANCER SCREENING: ICD-10-CM

## 2019-11-13 DIAGNOSIS — Z01.419 WELL WOMAN EXAM WITH ROUTINE GYNECOLOGICAL EXAM: Primary | ICD-10-CM

## 2019-11-13 PROCEDURE — 99396 PREV VISIT EST AGE 40-64: CPT | Performed by: OBSTETRICS & GYNECOLOGY

## 2019-11-13 PROCEDURE — 88175 CYTOPATH C/V AUTO FLUID REDO: CPT | Performed by: OBSTETRICS & GYNECOLOGY

## 2019-11-13 NOTE — PROGRESS NOTES
[facility]  Obstetrics and Gynecology  History & Physical    CC: Patient presents for a well woman exam     Subjective:     HPI: Wyatt Lorenzo is a 59year old [place] female here for a well women exam. Patient reports doing well.      OB Hist • Bloating    • Chest pain    • Constipation    • Diarrhea, unspecified    • Essential hypertension    • Flatulence/gas pain/belching    • Heart palpitations    • High cholesterol    • History of cardiac murmur    • HLD (hyperlipidemia)    • HTN (hypert Packs/day: 0.00        Quit date: 1987        Years since quittin.1      Smokeless tobacco: Never Used    Substance and Sexual Activity      Alcohol use:  Yes        Alcohol/week: 14.0 standard drinks        Types: 7 Glasses of wine, 7 Standard dr • No Known Problems Sister        Health maintenance:  Mammogram (age 36 and q1-2yr): bi rads 2 12/2018, scheduled for repeat   Colonoscopy (age 48 and q10yr): 03/2009    Review of Systems:  General:  denies fevers, chills, fatigue and malaise  Breast: BITA I with negative ECC on 11/2017  - Last pap smear NILM and HPV neg 10/2018  - repeat pap smear today   Health maintenance  - encouraged to maintain weight at healthy BMI  - discussed importance of exercise and healthy eating  - self breast exam instruct

## 2019-11-18 NOTE — PROGRESS NOTES
Patient notified. Patient verbalized understanding. Transferred to [facility] to schedule.

## 2019-11-18 NOTE — PROGRESS NOTES
Hx of BITA I in 2017  Normal pap smear 2018  Recommend colposcopy due to repeat pap smear noted for LSIL   Please provide instructions and information

## 2019-12-03 ENCOUNTER — HOSPITAL ENCOUNTER (OUTPATIENT)
Dept: MAMMOGRAPHY | Facility: HOSPITAL | Age: 64
Discharge: HOME | End: 2019-12-03
Attending: FAMILY MEDICINE
Payer: COMMERCIAL

## 2019-12-03 DIAGNOSIS — Z12.31 VISIT FOR SCREENING MAMMOGRAM: ICD-10-CM

## 2019-12-03 PROCEDURE — 77063 BREAST TOMOSYNTHESIS BI: CPT | Performed by: FAMILY MEDICINE

## 2019-12-03 PROCEDURE — 77067 SCR MAMMO BI INCL CAD: CPT | Performed by: FAMILY MEDICINE

## 2019-12-18 ENCOUNTER — OFFICE VISIT (OUTPATIENT)
Dept: OBGYN CLINIC | Facility: CLINIC | Age: 64
End: 2019-12-18
Payer: COMMERCIAL

## 2019-12-18 VITALS — DIASTOLIC BLOOD PRESSURE: 70 MMHG | WEIGHT: 161 LBS | SYSTOLIC BLOOD PRESSURE: 136 MMHG | BODY MASS INDEX: 24 KG/M2

## 2019-12-18 DIAGNOSIS — R87.612 PAPANICOLAOU SMEAR OF CERVIX WITH LOW GRADE SQUAMOUS INTRAEPITHELIAL LESION (LGSIL): Primary | ICD-10-CM

## 2019-12-18 PROCEDURE — 57454 BX/CURETT OF CERVIX W/SCOPE: CPT | Performed by: OBSTETRICS & GYNECOLOGY

## 2019-12-18 PROCEDURE — 88342 IMHCHEM/IMCYTCHM 1ST ANTB: CPT | Performed by: OBSTETRICS & GYNECOLOGY

## 2019-12-18 PROCEDURE — 88305 TISSUE EXAM BY PATHOLOGIST: CPT | Performed by: OBSTETRICS & GYNECOLOGY

## 2019-12-18 NOTE — PATIENT INSTRUCTIONS
[facility] Department of OB/GYN  After Care Instructions for Colposcopy/Biopsy      Biopsy Results   You will receive a phone call with your biopsy results in 7 business days. If you have not received your biopsy results in 7 days, please contact our office.   Selene Pabon

## 2019-12-19 PROBLEM — R87.612 PAPANICOLAOU SMEAR OF CERVIX WITH LOW GRADE SQUAMOUS INTRAEPITHELIAL LESION (LGSIL): Status: ACTIVE | Noted: 2019-12-19

## 2019-12-19 NOTE — PROCEDURES
Colposcopy Procedure Note    Date of Procedure: 12/19/19    Indications: 59year old y/o female with LGSIL on recent pap smear. She has a history of hx of ASCUS HPV positive in 10/2017 and s/p colposcopy noted for BITA I with negative ECC in 11/2017.  A norm

## 2019-12-19 NOTE — PROGRESS NOTES
Patient presents for scheduled colposcopy 2/2 LGSIL noted on pap smear on 11/13/19. She has a hx of ASCUS HPV positive in 10/2017 and s/p colposcopy noted for BITA I with negative ECC in 11/2017. A normal repeat pap smear in 2018.  Discussion held with the dakota

## 2019-12-27 NOTE — PROGRESS NOTES
Please inform patient of BITA I noted on colposcopy with negative ECC  Advised to repeat pap smear with co-testing in 1 year   Forwarded to abnormal pap smear pool

## 2020-01-20 ENCOUNTER — TELEPHONE (OUTPATIENT)
Dept: FAMILY MEDICINE CLINIC | Facility: CLINIC | Age: 65
End: 2020-01-20

## 2020-01-20 NOTE — TELEPHONE ENCOUNTER
No- we can plan for labs at her yearly physical.  Please let me know if you have any questions.   Baron Torres, DO 1/20/2020 1:28 PM

## 2020-01-20 NOTE — TELEPHONE ENCOUNTER
Patient called stated she just turned vegan and would like to know if Dr. Geoff Sinclair recommends her to have any blood work to see how she's doing

## 2020-02-26 ENCOUNTER — TELEPHONE (OUTPATIENT)
Dept: FAMILY MEDICINE CLINIC | Facility: CLINIC | Age: 65
End: 2020-02-26

## 2020-02-26 NOTE — TELEPHONE ENCOUNTER
Spoke to pt to offer shingrix. Pt stated she completed first 1st dose at [address] 1/20/20 and will we completing it at [address].  Pharmacy to fax over records

## 2020-03-12 DIAGNOSIS — N95.1 MENOPAUSAL AND FEMALE CLIMACTERIC STATES: ICD-10-CM

## 2020-03-16 RX ORDER — CITALOPRAM 20 MG/1
TABLET ORAL
Qty: 90 TABLET | Refills: 1 | Status: SHIPPED | OUTPATIENT
Start: 2020-03-16 | End: 2020-05-28

## 2020-03-16 NOTE — TELEPHONE ENCOUNTER
Refill request for:    Requested Prescriptions     Pending Prescriptions Disp Refills   • CITALOPRAM HYDROBROMIDE 20 MG Oral Tab [Pharmacy Med Name: CITALOPRAM 20MG TABLETS] 90 tablet 1     Sig: TAKE 1 TABLET(20 MG) BY MOUTH DAILY        Last Prescribed Ivett August

## 2020-05-28 ENCOUNTER — VIRTUAL PHONE E/M (OUTPATIENT)
Dept: FAMILY MEDICINE CLINIC | Facility: CLINIC | Age: 65
End: 2020-05-28
Payer: COMMERCIAL

## 2020-05-28 DIAGNOSIS — D75.89 MACROCYTOSIS WITHOUT ANEMIA: ICD-10-CM

## 2020-05-28 DIAGNOSIS — I10 ESSENTIAL HYPERTENSION: ICD-10-CM

## 2020-05-28 DIAGNOSIS — N95.1 MENOPAUSAL AND FEMALE CLIMACTERIC STATES: Primary | ICD-10-CM

## 2020-05-28 DIAGNOSIS — Z00.00 ROUTINE HEALTH MAINTENANCE: ICD-10-CM

## 2020-05-28 DIAGNOSIS — I47.1 PAROXYSMAL SVT (SUPRAVENTRICULAR TACHYCARDIA) (HCC): ICD-10-CM

## 2020-05-28 PROCEDURE — 99214 OFFICE O/P EST MOD 30 MIN: CPT | Performed by: FAMILY MEDICINE

## 2020-05-28 RX ORDER — CITALOPRAM 20 MG/1
20 TABLET ORAL DAILY
Qty: 90 TABLET | Refills: 1 | Status: SHIPPED | OUTPATIENT
Start: 2020-05-28 | End: 2020-10-15

## 2020-05-28 RX ORDER — LOSARTAN POTASSIUM 100 MG/1
100 TABLET ORAL DAILY
Qty: 90 TABLET | Refills: 1 | Status: SHIPPED | OUTPATIENT
Start: 2020-05-28 | End: 2020-10-15

## 2020-05-28 RX ORDER — DILTIAZEM HYDROCHLORIDE 180 MG/1
180 CAPSULE, COATED, EXTENDED RELEASE ORAL 2 TIMES DAILY
Qty: 180 CAPSULE | Refills: 1 | Status: SHIPPED | OUTPATIENT
Start: 2020-05-28 | End: 2020-10-15

## 2020-05-28 NOTE — PROGRESS NOTES
Virtual Telephone Check-In    Sparkle Husbands verbally consents to a Virtual/Telephone Check-In visit on 05/28/20. Patient has been referred to the [facility] [employer] website at [email or web address] to review the yearly Consent to Treat document.     Patient under months.   Ashley Peña DO

## 2020-05-29 ENCOUNTER — LAB ENCOUNTER (OUTPATIENT)
Dept: LAB | Facility: HOSPITAL | Age: 65
End: 2020-05-29
Attending: FAMILY MEDICINE
Payer: COMMERCIAL

## 2020-05-29 DIAGNOSIS — Z00.00 ROUTINE HEALTH MAINTENANCE: ICD-10-CM

## 2020-05-29 DIAGNOSIS — D75.89 MACROCYTOSIS WITHOUT ANEMIA: ICD-10-CM

## 2020-05-29 PROCEDURE — 36415 COLL VENOUS BLD VENIPUNCTURE: CPT

## 2020-05-29 PROCEDURE — 80061 LIPID PANEL: CPT

## 2020-05-29 PROCEDURE — 80053 COMPREHEN METABOLIC PANEL: CPT

## 2020-05-29 PROCEDURE — 85025 COMPLETE CBC W/AUTO DIFF WBC: CPT

## 2020-07-08 ENCOUNTER — HOSPITAL ENCOUNTER (OUTPATIENT)
Dept: CT IMAGING | Facility: HOSPITAL | Age: 65
Discharge: HOME | End: 2020-07-08
Attending: FAMILY MEDICINE

## 2020-07-08 DIAGNOSIS — Z13.6 SCREENING FOR CARDIOVASCULAR CONDITION: ICD-10-CM

## 2020-07-08 DIAGNOSIS — Z13.9 SPECIAL SCREENING: ICD-10-CM

## 2020-07-20 ENCOUNTER — HOSPITAL ENCOUNTER (OUTPATIENT)
Dept: CARDIOLOGY CLINIC | Facility: HOSPITAL | Age: 65
Discharge: HOME | End: 2020-07-20
Attending: FAMILY MEDICINE

## 2020-07-20 DIAGNOSIS — Z13.9 ENCOUNTER FOR SCREENING: ICD-10-CM

## 2020-08-04 ENCOUNTER — LAB ENCOUNTER (OUTPATIENT)
Dept: LAB | Facility: HOSPITAL | Age: 65
End: 2020-08-04
Attending: INTERNAL MEDICINE
Payer: COMMERCIAL

## 2020-08-04 DIAGNOSIS — R94.39 ABNORMAL STRESS TEST: ICD-10-CM

## 2020-08-04 LAB
ANION GAP SERPL CALC-SCNC: 3 MMOL/L (ref 0–18)
BASOPHILS # BLD AUTO: 0.05 X10(3) UL (ref 0–0.2)
BASOPHILS NFR BLD AUTO: 0.5 %
BUN BLD-MCNC: 13 MG/DL (ref 7–18)
BUN/CREAT SERPL: 16.5 (ref 10–20)
CALCIUM BLD-MCNC: 9.2 MG/DL (ref 8.5–10.1)
CHLORIDE SERPL-SCNC: 109 MMOL/L (ref 98–112)
CO2 SERPL-SCNC: 26 MMOL/L (ref 21–32)
CREAT BLD-MCNC: 0.79 MG/DL (ref 0.55–1.02)
DEPRECATED RDW RBC AUTO: 45.7 FL (ref 35.1–46.3)
EOSINOPHIL # BLD AUTO: 0.13 X10(3) UL (ref 0–0.7)
EOSINOPHIL NFR BLD AUTO: 1.3 %
ERYTHROCYTE [DISTWIDTH] IN BLOOD BY AUTOMATED COUNT: 12.5 % (ref 11–15)
GLUCOSE BLD-MCNC: 101 MG/DL (ref 70–99)
HCT VFR BLD AUTO: 40.5 % (ref 35–48)
HGB BLD-MCNC: 13.5 G/DL (ref 12–16)
IMM GRANULOCYTES # BLD AUTO: 0.04 X10(3) UL (ref 0–1)
IMM GRANULOCYTES NFR BLD: 0.4 %
LYMPHOCYTES # BLD AUTO: 2.21 X10(3) UL (ref 1–4)
LYMPHOCYTES NFR BLD AUTO: 22.1 %
MCH RBC QN AUTO: 33.3 PG (ref 26–34)
MCHC RBC AUTO-ENTMCNC: 33.3 G/DL (ref 31–37)
MCV RBC AUTO: 99.8 FL (ref 80–100)
MONOCYTES # BLD AUTO: 0.63 X10(3) UL (ref 0.1–1)
MONOCYTES NFR BLD AUTO: 6.3 %
NEUTROPHILS # BLD AUTO: 6.94 X10 (3) UL (ref 1.5–7.7)
NEUTROPHILS # BLD AUTO: 6.94 X10(3) UL (ref 1.5–7.7)
NEUTROPHILS NFR BLD AUTO: 69.4 %
OSMOLALITY SERPL CALC.SUM OF ELEC: 286 MOSM/KG (ref 275–295)
PATIENT FASTING Y/N/NP: NO
PLATELET # BLD AUTO: 335 10(3)UL (ref 150–450)
POTASSIUM SERPL-SCNC: 4.6 MMOL/L (ref 3.5–5.1)
RBC # BLD AUTO: 4.06 X10(6)UL (ref 3.8–5.3)
SODIUM SERPL-SCNC: 138 MMOL/L (ref 136–145)
WBC # BLD AUTO: 10 X10(3) UL (ref 4–11)

## 2020-08-04 PROCEDURE — 80048 BASIC METABOLIC PNL TOTAL CA: CPT

## 2020-08-04 PROCEDURE — 36415 COLL VENOUS BLD VENIPUNCTURE: CPT

## 2020-08-04 PROCEDURE — 85025 COMPLETE CBC W/AUTO DIFF WBC: CPT

## 2020-08-05 LAB — SARS-COV-2 RNA RESP QL NAA+PROBE: NOT DETECTED

## 2020-08-05 RX ORDER — ASPIRIN 81 MG/1
81 TABLET ORAL DAILY
COMMUNITY

## 2020-08-06 ENCOUNTER — HOSPITAL ENCOUNTER (OUTPATIENT)
Dept: INTERVENTIONAL RADIOLOGY/VASCULAR | Facility: HOSPITAL | Age: 65
Discharge: HOME | End: 2020-08-06
Attending: INTERNAL MEDICINE
Payer: COMMERCIAL

## 2020-08-06 DIAGNOSIS — R94.39 ABNORMAL STRESS TEST: Primary | ICD-10-CM

## 2020-08-07 ENCOUNTER — HOSPITAL ENCOUNTER (OUTPATIENT)
Dept: INTERVENTIONAL RADIOLOGY/VASCULAR | Facility: HOSPITAL | Age: 65
Discharge: HOME | End: 2020-08-07
Attending: INTERNAL MEDICINE | Admitting: INTERNAL MEDICINE
Payer: COMMERCIAL

## 2020-08-07 VITALS
DIASTOLIC BLOOD PRESSURE: 53 MMHG | RESPIRATION RATE: 22 BRPM | WEIGHT: 158 LBS | SYSTOLIC BLOOD PRESSURE: 105 MMHG | HEIGHT: 69 IN | BODY MASS INDEX: 23.4 KG/M2 | TEMPERATURE: 99 F | HEART RATE: 70 BPM | OXYGEN SATURATION: 98 %

## 2020-08-07 DIAGNOSIS — R94.39 ABNORMAL STRESS TEST: ICD-10-CM

## 2020-08-07 PROCEDURE — B2001ZZ PLAIN RADIOGRAPHY OF SINGLE CORONARY ARTERY USING LOW OSMOLAR CONTRAST: ICD-10-PCS | Performed by: INTERNAL MEDICINE

## 2020-08-07 PROCEDURE — 99153 MOD SED SAME PHYS/QHP EA: CPT

## 2020-08-07 PROCEDURE — B2051ZZ PLAIN RADIOGRAPHY OF LEFT HEART USING LOW OSMOLAR CONTRAST: ICD-10-PCS | Performed by: INTERNAL MEDICINE

## 2020-08-07 PROCEDURE — 4A023N7 MEASUREMENT OF CARDIAC SAMPLING AND PRESSURE, LEFT HEART, PERCUTANEOUS APPROACH: ICD-10-PCS | Performed by: INTERNAL MEDICINE

## 2020-08-07 PROCEDURE — 99152 MOD SED SAME PHYS/QHP 5/>YRS: CPT

## 2020-08-07 PROCEDURE — 93458 L HRT ARTERY/VENTRICLE ANGIO: CPT

## 2020-08-07 RX ORDER — LIDOCAINE HYDROCHLORIDE 10 MG/ML
INJECTION, SOLUTION EPIDURAL; INFILTRATION; INTRACAUDAL; PERINEURAL
Status: COMPLETED
Start: 2020-08-07 | End: 2020-08-07

## 2020-08-07 RX ORDER — HEPARIN SODIUM 5000 [USP'U]/ML
INJECTION, SOLUTION INTRAVENOUS; SUBCUTANEOUS
Status: COMPLETED
Start: 2020-08-07 | End: 2020-08-07

## 2020-08-07 RX ORDER — VERAPAMIL HYDROCHLORIDE 2.5 MG/ML
INJECTION, SOLUTION INTRAVENOUS
Status: COMPLETED
Start: 2020-08-07 | End: 2020-08-07

## 2020-08-07 RX ORDER — MIDAZOLAM HYDROCHLORIDE 1 MG/ML
INJECTION INTRAMUSCULAR; INTRAVENOUS
Status: COMPLETED
Start: 2020-08-07 | End: 2020-08-07

## 2020-08-07 RX ORDER — SODIUM CHLORIDE 9 MG/ML
INJECTION, SOLUTION INTRAVENOUS
Status: DISCONTINUED | OUTPATIENT
Start: 2020-08-08 | End: 2020-08-07 | Stop reason: HOSPADM

## 2020-08-07 NOTE — PROGRESS NOTES
S/P LHC right radial, c/d/I. VSS. Pedals palpable. Pt denies pain. karol po well. D/c instructions reviewed. Pt ambulated to washroom. TR band removed without difficulty. Arm board applied.  IV d/c'd and pt d/c'd to [facility] via COREY Pabon in stable condition

## 2020-08-07 NOTE — PROCEDURES
[facility] Location: Cath Lab    CSN [id number] MRN [id number]   Admission Date 8/7/2020 Procedure Date 8/7/2020   Attending Physician Tyler Gordon MD Procedure Physician Roque Rain MD         CARDIAC CATHETERIZATION REPORT catheter. 2.  Selective coronary angiography:      Left main artery: The left main artery is a medium caliber, bifurcating vessel without significant angiographic disease.      LAD:  The left anterior descending artery is a medium caliber vessel that tino

## 2020-08-07 NOTE — H&P
[facility]  History and Physical      Wyatt Lorenzo Patient Status:  Outpatient in a Bed    10/13/1955 MRN [id number]   Location [address] [facility] Attending Kenzie Veronica MD   Hosp Day # 0 PCP Luis Alberto Diehl, apnea    • Wears glasses        Past Surgical History:   Procedure Laterality Date   • COLONOSCOPY  03/09/2009   • COLONOSCOPY     • EGD     • REMOVAL OF COCCYX     • WISDOM TEETH REMOVED         Family History  family history includes Heart Attack in her lb (71.7 kg), not currently breastfeeding.   Temp (24hrs), Av.5 °F (36.9 °C), Min:98.5 °F (36.9 °C), Max:98.5 °F (36.9 °C)    Wt Readings from Last 3 Encounters:  20 : 158 lb (71.7 kg)  07/10/20 : 156 lb (70.8 kg)  19 : 161 lb (73 kg)      G

## 2020-08-20 PROBLEM — I25.10 CORONARY ARTERY DISEASE INVOLVING NATIVE CORONARY ARTERY OF NATIVE HEART WITHOUT ANGINA PECTORIS: Status: ACTIVE | Noted: 2020-08-20

## 2020-10-06 ENCOUNTER — LAB ENCOUNTER (OUTPATIENT)
Dept: LAB | Facility: HOSPITAL | Age: 65
End: 2020-10-06
Attending: NURSE PRACTITIONER
Payer: MEDICARE

## 2020-10-06 DIAGNOSIS — I25.10 CORONARY ARTERY DISEASE INVOLVING NATIVE CORONARY ARTERY OF NATIVE HEART WITHOUT ANGINA PECTORIS: ICD-10-CM

## 2020-10-06 DIAGNOSIS — E78.00 PURE HYPERCHOLESTEROLEMIA: ICD-10-CM

## 2020-10-06 PROCEDURE — 36415 COLL VENOUS BLD VENIPUNCTURE: CPT

## 2020-10-06 PROCEDURE — 80053 COMPREHEN METABOLIC PANEL: CPT

## 2020-10-06 PROCEDURE — 80061 LIPID PANEL: CPT

## 2020-10-06 NOTE — PROGRESS NOTES
Charlie Bliss,     Your cholesterol levels are good!  Your kidney and liver function tests are normal.     SOPHY Machado

## 2020-10-15 ENCOUNTER — OFFICE VISIT (OUTPATIENT)
Dept: FAMILY MEDICINE CLINIC | Facility: CLINIC | Age: 65
End: 2020-10-15
Payer: MEDICARE

## 2020-10-15 VITALS
HEART RATE: 68 BPM | HEIGHT: 69 IN | TEMPERATURE: 98 F | BODY MASS INDEX: 23.55 KG/M2 | SYSTOLIC BLOOD PRESSURE: 118 MMHG | RESPIRATION RATE: 16 BRPM | WEIGHT: 159 LBS | DIASTOLIC BLOOD PRESSURE: 70 MMHG

## 2020-10-15 DIAGNOSIS — E78.00 PURE HYPERCHOLESTEROLEMIA: ICD-10-CM

## 2020-10-15 DIAGNOSIS — Z00.00 WELL WOMAN EXAM WITHOUT GYNECOLOGICAL EXAM: Primary | ICD-10-CM

## 2020-10-15 DIAGNOSIS — I47.1 PAROXYSMAL SVT (SUPRAVENTRICULAR TACHYCARDIA) (HCC): ICD-10-CM

## 2020-10-15 DIAGNOSIS — I25.10 CORONARY ARTERY DISEASE INVOLVING NATIVE CORONARY ARTERY OF NATIVE HEART WITHOUT ANGINA PECTORIS: ICD-10-CM

## 2020-10-15 DIAGNOSIS — I10 ESSENTIAL HYPERTENSION: ICD-10-CM

## 2020-10-15 DIAGNOSIS — N95.1 MENOPAUSAL AND FEMALE CLIMACTERIC STATES: ICD-10-CM

## 2020-10-15 DIAGNOSIS — Z78.0 POSTMENOPAUSE: ICD-10-CM

## 2020-10-15 DIAGNOSIS — Z12.31 VISIT FOR SCREENING MAMMOGRAM: ICD-10-CM

## 2020-10-15 PROCEDURE — 90662 IIV NO PRSV INCREASED AG IM: CPT | Performed by: FAMILY MEDICINE

## 2020-10-15 PROCEDURE — G0008 ADMIN INFLUENZA VIRUS VAC: HCPCS | Performed by: FAMILY MEDICINE

## 2020-10-15 PROCEDURE — 99214 OFFICE O/P EST MOD 30 MIN: CPT | Performed by: FAMILY MEDICINE

## 2020-10-15 RX ORDER — CITALOPRAM 20 MG/1
20 TABLET ORAL DAILY
Qty: 90 TABLET | Refills: 1 | Status: SHIPPED | OUTPATIENT
Start: 2020-10-15 | End: 2021-05-13

## 2020-10-15 RX ORDER — ROSUVASTATIN CALCIUM 5 MG/1
5 TABLET, COATED ORAL NIGHTLY
Qty: 90 TABLET | Refills: 3 | Status: SHIPPED | OUTPATIENT
Start: 2020-10-15 | End: 2021-07-21

## 2020-10-15 RX ORDER — DILTIAZEM HYDROCHLORIDE 180 MG/1
180 CAPSULE, COATED, EXTENDED RELEASE ORAL 2 TIMES DAILY
Qty: 180 CAPSULE | Refills: 1 | Status: SHIPPED | OUTPATIENT
Start: 2020-10-15 | End: 2021-05-20

## 2020-10-15 RX ORDER — LOSARTAN POTASSIUM 100 MG/1
100 TABLET ORAL DAILY
Qty: 90 TABLET | Refills: 1 | Status: SHIPPED | OUTPATIENT
Start: 2020-10-15 | End: 2021-04-30

## 2020-10-15 NOTE — PROGRESS NOTES
SUBJECTIVE:  Patient presents with:  Physical: WWE no pap  Immunization/Injection: flu shot    HPI:  R shoulder: Notes pain when wakes in the morning. Pain when practicing yoga. No particular movements seem to exacerbate. Denies numbness, tingling.      H negative  Hypercholesterolemia screening:   Lab Results   Component Value Date    HDL 79 (H) 10/06/2020    HDL 75 (H) 05/29/2020    HDL 76 (H) 07/26/2019     Lab Results   Component Value Date    LDL 75 10/06/2020     (H) 05/29/2020     (H) 0 Years since quittin.0      Smokeless tobacco: Never Used    Alcohol use: Yes      Alcohol/week: 14.0 standard drinks      Types: 7 Glasses of wine, 7 Standard drinks or equivalent per week      Comment: daily wine with dinner    Drug use:  No losartan 100 MG Oral Tab    HLD (hyperlipidemia)    Relevant Medications    Rosuvastatin Calcium 5 MG Oral Tab    Paroxysmal SVT (supraventricular tachycardia) (HCC)    Relevant Medications    dilTIAZem HCl ER Coated Beads 180 MG Oral Capsule SR 24 Hr    C negative  Cervical Cancer Screening: to see gyne  Diabetes screening: negative  Hypercholesterolemia screening: negative  Osteoporosis:   -     XR DEXA BONE DENSITOMETRY (CPT=77080);  Future  Colon Cancer screening: Due 2024  Breast Cancer screening:  -

## 2020-10-20 ENCOUNTER — HOSPITAL ENCOUNTER (OUTPATIENT)
Dept: BONE DENSITY | Age: 65
Discharge: HOME | End: 2020-10-20
Attending: FAMILY MEDICINE
Payer: MEDICARE

## 2020-10-20 DIAGNOSIS — Z78.0 POSTMENOPAUSE: ICD-10-CM

## 2020-10-20 PROCEDURE — 77080 DXA BONE DENSITY AXIAL: CPT | Performed by: FAMILY MEDICINE

## 2020-10-29 ENCOUNTER — TELEPHONE (OUTPATIENT)
Dept: FAMILY MEDICINE CLINIC | Facility: CLINIC | Age: 65
End: 2020-10-29

## 2020-10-29 DIAGNOSIS — G47.33 OBSTRUCTIVE SLEEP APNEA: Primary | ICD-10-CM

## 2020-10-29 NOTE — TELEPHONE ENCOUNTER
Referral request THELMA Ferrari  Fax number: [phone] quantity 2 x 1 month   quantity 1 x 3 months   quantity 1 x 6 months   quantity 1 x 3 months   quantity 2 x 1 month   quantity 1 x 6 months      Office notes from

## 2020-11-02 ENCOUNTER — TELEPHONE (OUTPATIENT)
Dept: FAMILY MEDICINE CLINIC | Facility: CLINIC | Age: 65
End: 2020-11-02

## 2020-11-02 DIAGNOSIS — Z23 NEED FOR PROPHYLACTIC VACCINATION AGAINST STREPTOCOCCUS PNEUMONIAE (PNEUMOCOCCUS) AND INFLUENZA: Primary | ICD-10-CM

## 2020-11-30 ENCOUNTER — NURSE ONLY (OUTPATIENT)
Dept: FAMILY MEDICINE CLINIC | Facility: CLINIC | Age: 65
End: 2020-11-30
Payer: MEDICARE

## 2020-11-30 VITALS — TEMPERATURE: 97 F

## 2020-11-30 DIAGNOSIS — Z23 NEED FOR VACCINATION: Primary | ICD-10-CM

## 2020-11-30 PROCEDURE — 90732 PPSV23 VACC 2 YRS+ SUBQ/IM: CPT | Performed by: FAMILY MEDICINE

## 2020-11-30 PROCEDURE — G0009 ADMIN PNEUMOCOCCAL VACCINE: HCPCS | Performed by: FAMILY MEDICINE

## 2020-11-30 NOTE — PROGRESS NOTES
Patient presents for pneumovax 23. This vaccine was ordered by Dr. Colton Peterson DO. Patient is given the VIS. The pneumovax is administered into the right deltoid.    After sitting for a few minutes the patient left the office without complaint

## 2020-12-07 ENCOUNTER — HOSPITAL ENCOUNTER (OUTPATIENT)
Dept: MAMMOGRAPHY | Facility: HOSPITAL | Age: 65
Discharge: HOME | End: 2020-12-07
Attending: FAMILY MEDICINE
Payer: MEDICARE

## 2020-12-07 DIAGNOSIS — Z12.31 VISIT FOR SCREENING MAMMOGRAM: ICD-10-CM

## 2020-12-07 PROCEDURE — 77063 BREAST TOMOSYNTHESIS BI: CPT | Performed by: FAMILY MEDICINE

## 2020-12-07 PROCEDURE — 77067 SCR MAMMO BI INCL CAD: CPT | Performed by: FAMILY MEDICINE

## 2020-12-10 ENCOUNTER — HOSPITAL ENCOUNTER (OUTPATIENT)
Dept: MAMMOGRAPHY | Facility: HOSPITAL | Age: 65
Discharge: HOME | End: 2020-12-10
Attending: FAMILY MEDICINE
Payer: MEDICARE

## 2020-12-10 DIAGNOSIS — R92.2 INCONCLUSIVE MAMMOGRAM: ICD-10-CM

## 2020-12-10 PROCEDURE — 76642 ULTRASOUND BREAST LIMITED: CPT | Performed by: FAMILY MEDICINE

## 2020-12-10 PROCEDURE — 77065 DX MAMMO INCL CAD UNI: CPT | Performed by: FAMILY MEDICINE

## 2020-12-10 PROCEDURE — 77061 BREAST TOMOSYNTHESIS UNI: CPT | Performed by: FAMILY MEDICINE

## 2021-01-06 ENCOUNTER — TELEPHONE (OUTPATIENT)
Dept: FAMILY MEDICINE CLINIC | Facility: CLINIC | Age: 66
End: 2021-01-06

## 2021-01-06 NOTE — TELEPHONE ENCOUNTER
Per pt's request, faxed CT Calcium Scoring test results to [facility] [address] attention Dr. Darius Hinojosa @ [phone].

## 2021-01-27 ENCOUNTER — OFFICE VISIT (OUTPATIENT)
Dept: OBGYN CLINIC | Facility: CLINIC | Age: 66
End: 2021-01-27
Payer: MEDICARE

## 2021-01-27 VITALS
WEIGHT: 158.38 LBS | SYSTOLIC BLOOD PRESSURE: 128 MMHG | HEIGHT: 68 IN | HEART RATE: 81 BPM | DIASTOLIC BLOOD PRESSURE: 70 MMHG | BODY MASS INDEX: 24 KG/M2

## 2021-01-27 DIAGNOSIS — Z12.4 ENCOUNTER FOR SCREENING FOR CERVICAL CANCER: ICD-10-CM

## 2021-01-27 DIAGNOSIS — Z01.419 WELL WOMAN EXAM WITH ROUTINE GYNECOLOGICAL EXAM: Primary | ICD-10-CM

## 2021-01-27 DIAGNOSIS — N87.0 CIN I (CERVICAL INTRAEPITHELIAL NEOPLASIA I): ICD-10-CM

## 2021-01-27 PROCEDURE — 87624 HPV HI-RISK TYP POOLED RSLT: CPT | Performed by: OBSTETRICS & GYNECOLOGY

## 2021-01-27 PROCEDURE — 87625 HPV TYPES 16 & 18 ONLY: CPT | Performed by: OBSTETRICS & GYNECOLOGY

## 2021-01-27 PROCEDURE — 88175 CYTOPATH C/V AUTO FLUID REDO: CPT | Performed by: OBSTETRICS & GYNECOLOGY

## 2021-01-27 PROCEDURE — G0101 CA SCREEN;PELVIC/BREAST EXAM: HCPCS | Performed by: OBSTETRICS & GYNECOLOGY

## 2021-01-27 NOTE — PROGRESS NOTES
[facility]  Obstetrics and Gynecology  History & Physical    CC: Patient presents for a well woman exam     Subjective:     HPI: Siddharth Kumar is a 72year old [place] female here for a well women exam. Patient reports doing well.  She reports daily., Disp: , Rfl:     No current facility-administered medications on file prior to visit. All:    Atenolol                HIVES  Beta Adrenergic Blo*    HIVES    Comment:Atenolo, Toprol.   Duloxetine              OTHER (SEE COMMENTS)  Penicillins Influenza(Afluria)0.5ml QIV PFS 3yr & older                          10/03/2018      Pneumovax 23          11/30/2020      TDAP                  06/02/2015      Zoster Vaccine Live (Zostavax)                          11/01/2016      Zoster Vaccine Recombin organizations: Not on file        Relationship status: Not on file      Intimate partner violence        Fear of current or ex partner: Not on file        Emotionally abused: Not on file        Physically abused: Not on file        Forced sexual activity: perfusion  Chest: non-labored breathing, no tachypnea   Breast: symmetric, no dominant or suspicious mass, no skin or nipple changes and no axillary adenopathy  Abdominal exam: soft, nontender, nondistended  Pelvic exam:   VULVA: normal appearing vulva wit provided  - recommended to return in 3 months or more if symptoms not improved, may consider vaginal estrogen           All of the findings and plan were discussed with the patient. She notes understanding and agrees with the plan of care.   All questions

## 2021-02-01 ENCOUNTER — TELEPHONE (OUTPATIENT)
Dept: FAMILY MEDICINE CLINIC | Facility: CLINIC | Age: 66
End: 2021-02-01

## 2021-02-01 ENCOUNTER — OFFICE VISIT (OUTPATIENT)
Dept: FAMILY MEDICINE CLINIC | Facility: CLINIC | Age: 66
End: 2021-02-01
Payer: MEDICARE

## 2021-02-01 VITALS
TEMPERATURE: 97 F | DIASTOLIC BLOOD PRESSURE: 70 MMHG | BODY MASS INDEX: 24.34 KG/M2 | HEART RATE: 92 BPM | WEIGHT: 160.63 LBS | RESPIRATION RATE: 20 BRPM | HEIGHT: 68 IN | SYSTOLIC BLOOD PRESSURE: 138 MMHG

## 2021-02-01 DIAGNOSIS — N30.00 ACUTE CYSTITIS WITHOUT HEMATURIA: Primary | ICD-10-CM

## 2021-02-01 LAB
MULTISTIX LOT#: 1044 NUMERIC
PH, URINE: 7 (ref 4.5–8)
SPECIFIC GRAVITY: 1.02 (ref 1–1.03)
URINE-COLOR: YELLOW
UROBILINOGEN,SEMI-QN: 0.2 MG/DL (ref 0–1.9)

## 2021-02-01 PROCEDURE — 87077 CULTURE AEROBIC IDENTIFY: CPT | Performed by: FAMILY MEDICINE

## 2021-02-01 PROCEDURE — 81003 URINALYSIS AUTO W/O SCOPE: CPT | Performed by: FAMILY MEDICINE

## 2021-02-01 PROCEDURE — 87186 SC STD MICRODIL/AGAR DIL: CPT | Performed by: FAMILY MEDICINE

## 2021-02-01 PROCEDURE — 99213 OFFICE O/P EST LOW 20 MIN: CPT | Performed by: FAMILY MEDICINE

## 2021-02-01 PROCEDURE — 87086 URINE CULTURE/COLONY COUNT: CPT | Performed by: FAMILY MEDICINE

## 2021-02-01 RX ORDER — NITROFURANTOIN 25; 75 MG/1; MG/1
100 CAPSULE ORAL 2 TIMES DAILY
Qty: 10 CAPSULE | Refills: 0 | Status: SHIPPED | OUTPATIENT
Start: 2021-02-01 | End: 2021-02-06

## 2021-02-01 RX ORDER — PHENAZOPYRIDINE HYDROCHLORIDE 100 MG/1
100 TABLET, FILM COATED ORAL 3 TIMES DAILY PRN
Qty: 20 TABLET | Refills: 0 | Status: SHIPPED | OUTPATIENT
Start: 2021-02-01 | End: 2021-02-11

## 2021-02-01 NOTE — PROGRESS NOTES
Subjective   HPI:   This is a 72year old female coming in for had concerns including UTI (started this morning, pain and burning, when urinating can't stop the flow ). .  Dysuria,     UTI  This is a new problem. The current episode started today.  The probl 1.005 - 1.030    Blood Urine large Negative    PH Urine 7.0 4.5 - 8.0    Protein Urine trace Negative/Trace mg/dL    Urobilinogen Urine 0.2 0.0 - 1.9 mg/dL    Nitrite Urine neg Negative    Leukocyte Esterase Urine large Negative    APPEARANCE cloudy Clear

## 2021-02-01 NOTE — TELEPHONE ENCOUNTER
Future Appointments     Date Time Provider Prema Delores   2021  4:15 PM Dee Dee Alanis MD [facility] 3 [facility]   2021  8:20 AM Adrianna Marie MD [facility] [address]   2022 11:00 AM Maggie Barrera MD [facility] N [facility]

## 2021-02-01 NOTE — TELEPHONE ENCOUNTER
Pt scheduled an appt for tomorrow with Nickolas Saldaña for UTI and is wondering what to do in the meantime as she is having pain?

## 2021-02-02 LAB — HPV I/H RISK 1 DNA SPEC QL NAA+PROBE: POSITIVE

## 2021-02-03 LAB
HPV16 DNA CVX QL PROBE+SIG AMP: NEGATIVE
HPV18 DNA CVX QL PROBE+SIG AMP: NEGATIVE

## 2021-02-03 NOTE — PROGRESS NOTES
Please inform of ASCUS and HPV positive   hx of LGSIL noted on pap smear on 11/13/19 s/p colposcopy noted for BITA 1 on biopsy and ECC. She has a hx of ASCUS HPV positive in 10/2017 and s/p colposcopy noted for BITA I with negative ECC in 11/2017.  A normal

## 2021-02-03 NOTE — PROGRESS NOTES
Patient informed of results. Verbalized understanding. No further questions or concerns. She will call back tomorrow to schedule.

## 2021-03-01 ENCOUNTER — OFFICE VISIT (OUTPATIENT)
Dept: OBGYN CLINIC | Facility: CLINIC | Age: 66
End: 2021-03-01
Payer: MEDICARE

## 2021-03-01 VITALS
HEIGHT: 68 IN | WEIGHT: 159 LBS | SYSTOLIC BLOOD PRESSURE: 128 MMHG | BODY MASS INDEX: 24.1 KG/M2 | DIASTOLIC BLOOD PRESSURE: 58 MMHG

## 2021-03-01 DIAGNOSIS — R87.610 ASCUS WITH POSITIVE HIGH RISK HPV CERVICAL: Primary | ICD-10-CM

## 2021-03-01 DIAGNOSIS — R87.810 ASCUS WITH POSITIVE HIGH RISK HPV CERVICAL: Primary | ICD-10-CM

## 2021-03-01 PROCEDURE — 57454 BX/CURETT OF CERVIX W/SCOPE: CPT | Performed by: OBSTETRICS & GYNECOLOGY

## 2021-03-01 PROCEDURE — 88305 TISSUE EXAM BY PATHOLOGIST: CPT | Performed by: OBSTETRICS & GYNECOLOGY

## 2021-03-01 NOTE — PATIENT INSTRUCTIONS
[facility] Department of OB/GYN  After Care Instructions for Colposcopy/Biopsy      Biopsy Results   You will receive a phone call with your biopsy results in 7 business days. If you have not received your biopsy results in 7 days, please contact our office.   Christy Ryan

## 2021-03-01 NOTE — PROGRESS NOTES
Patient presents for scheduled colposcopy 2/2 ASCUS and HPV positive   hx of LGSIL noted on pap smear on 11/13/19 s/p colposcopy noted for BITA 1 on biopsy and ECC.  She has a hx of ASCUS HPV positive in 10/2017 and s/p colposcopy noted for BITA I with negati

## 2021-03-01 NOTE — PROCEDURES
Colposcopy Procedure Note    Date of Procedure: 03/01/21    Indications: 72year old y/o female with ASCUS and HPV positive. She has a hx of LGSIL noted on pap smear on 11/13/19 s/p colposcopy noted for BITA 1 on biopsy and ECC.  She has a hx of ASCUS HPV po The transformation zone was fully visualized. No lesions noted. unsatisfactory Colposcopy. Therefore, ECC was collected. Biopsy sites were examined. Silver Nitrate was applied to the cervix. Good hemostasis noted.  All instruments were removed from the

## 2021-03-05 ENCOUNTER — TELEPHONE (OUTPATIENT)
Dept: OBGYN CLINIC | Facility: CLINIC | Age: 66
End: 2021-03-05

## 2021-03-05 NOTE — PROGRESS NOTES
Patient informed of results. Verbalized understanding. No further questions or concerns. Routed to [facility] to schedule.

## 2021-03-05 NOTE — TELEPHONE ENCOUNTER
Patient made her Colpo for April 14th. Patient states that this needs to be sooner. Pt prefers to not see a male doctor and wants to be seen in March. I told pt I could put her on the wait list.  She wants to talk to a nurse regarding this.

## 2021-03-05 NOTE — TELEPHONE ENCOUNTER
Call made to patient. Reviewed schedule and there was 30 minute opening on Monday. (Probable cancellation). Patient will come in for appointment that day. No further questions or concerns. PSR notified.

## 2021-03-05 NOTE — PROGRESS NOTES
colposcopy 2/2 ASCUS and HPV positive   - hx of LGSIL noted on pap smear on 11/13/19 s/p colposcopy noted for BITA 1 on biopsy and ECC  - hx of ASCUS HPV positive in 10/2017 and s/p colposcopy noted for BITA I with negative ECC in 11/2017  - repeat pap smear

## 2021-03-08 ENCOUNTER — OFFICE VISIT (OUTPATIENT)
Dept: OBGYN CLINIC | Facility: CLINIC | Age: 66
End: 2021-03-08
Payer: MEDICARE

## 2021-03-08 VITALS — DIASTOLIC BLOOD PRESSURE: 70 MMHG | WEIGHT: 161.38 LBS | BODY MASS INDEX: 25 KG/M2 | SYSTOLIC BLOOD PRESSURE: 150 MMHG

## 2021-03-08 DIAGNOSIS — R87.810 ASCUS WITH POSITIVE HIGH RISK HPV CERVICAL: Primary | ICD-10-CM

## 2021-03-08 DIAGNOSIS — R87.610 ASCUS WITH POSITIVE HIGH RISK HPV CERVICAL: Primary | ICD-10-CM

## 2021-03-08 PROCEDURE — 88305 TISSUE EXAM BY PATHOLOGIST: CPT | Performed by: OBSTETRICS & GYNECOLOGY

## 2021-03-08 PROCEDURE — 57505 ENDOCERVICAL CURETTAGE: CPT | Performed by: OBSTETRICS & GYNECOLOGY

## 2021-03-08 NOTE — PROCEDURES
Colposcopy Procedure Note    Date of Procedure: 03/08/21    Indications: 72year old y/o female with with ASCUS and HPV positive.  She had a colposcopy with cervical biopsy and ECC on 03/01/21 noted for negative cervical biopsy but insufficient ECC with julianna

## 2021-03-12 DIAGNOSIS — Z23 NEED FOR VACCINATION: ICD-10-CM

## 2021-03-18 ENCOUNTER — TELEPHONE (OUTPATIENT)
Dept: OBGYN CLINIC | Facility: CLINIC | Age: 66
End: 2021-03-18

## 2021-03-18 NOTE — TELEPHONE ENCOUNTER
Dr. Childs Edu, can you please advise on pathology results since Dr. Sakina Zimmerman is out of office.

## 2021-03-19 NOTE — TELEPHONE ENCOUNTER
Pt calling back looking for results. Advised pt that Dr. Terri Diez has been out this week and her results came in on 3/15. Advised pt that Dr. Terri Diez will be back in on Monday and will review her results at that time. Pt asking for another MD to review.   D

## 2021-03-22 NOTE — TELEPHONE ENCOUNTER
Contacted patient. Informed of results. Non-diagnostic findings. D/w patient. Recommend to repeat pap smear with co-testing and ECC in 6 months. Patient agreeable. All questions and concerns were addressed.      Paulo Cortez MD   [facility] - OBGYN

## 2021-03-29 ENCOUNTER — MED REC SCAN ONLY (OUTPATIENT)
Dept: OBGYN CLINIC | Facility: CLINIC | Age: 66
End: 2021-03-29

## 2021-04-30 ENCOUNTER — TELEMEDICINE (OUTPATIENT)
Dept: FAMILY MEDICINE CLINIC | Facility: CLINIC | Age: 66
End: 2021-04-30

## 2021-04-30 DIAGNOSIS — R05.9 COUGH: Primary | ICD-10-CM

## 2021-04-30 DIAGNOSIS — I10 ESSENTIAL HYPERTENSION: ICD-10-CM

## 2021-04-30 DIAGNOSIS — Z87.891 FORMER SMOKER: ICD-10-CM

## 2021-04-30 DIAGNOSIS — R91.8 MULTIPLE LUNG NODULES ON CT: ICD-10-CM

## 2021-04-30 DIAGNOSIS — J30.2 SEASONAL ALLERGIES: ICD-10-CM

## 2021-04-30 PROCEDURE — 99213 OFFICE O/P EST LOW 20 MIN: CPT | Performed by: NURSE PRACTITIONER

## 2021-04-30 RX ORDER — PREDNISONE 20 MG/1
40 TABLET ORAL DAILY
Qty: 10 TABLET | Refills: 0 | Status: SHIPPED | OUTPATIENT
Start: 2021-04-30 | End: 2021-08-12 | Stop reason: ALTCHOICE

## 2021-04-30 RX ORDER — FLUTICASONE PROPIONATE 50 MCG
1 SPRAY, SUSPENSION (ML) NASAL 2 TIMES DAILY
Qty: 1 BOTTLE | Refills: 0 | Status: SHIPPED | OUTPATIENT
Start: 2021-04-30 | End: 2021-04-30

## 2021-04-30 RX ORDER — LOSARTAN POTASSIUM 100 MG/1
TABLET ORAL
Qty: 90 TABLET | Refills: 1 | Status: SHIPPED | OUTPATIENT
Start: 2021-04-30 | End: 2021-08-06

## 2021-04-30 RX ORDER — FLUTICASONE PROPIONATE 50 MCG
SPRAY, SUSPENSION (ML) NASAL
Qty: 48 G | Refills: 0 | Status: SHIPPED | OUTPATIENT
Start: 2021-04-30 | End: 2021-08-12 | Stop reason: ALTCHOICE

## 2021-04-30 NOTE — PATIENT INSTRUCTIONS
Take prednisone, 2 tabs, at the same time, daily for 5 days. Use Flonase, one spray each nostril morning and evening for 2 weeks.      Stop Claritin, switch to any other brand of over the counter allergy medicine of your choice and tino

## 2021-04-30 NOTE — PROGRESS NOTES
No chief complaint on file. This visit was conducted using Telemedicine with live, interactive video and audio. Patient understands and accepts financial responsibility for any deductible, co-insurance and/or co-pays associated with this service. Nausea     Papanicolaou smear of cervix with low grade squamous intraepithelial lesion (LGSIL)     Coronary artery disease involving native coronary artery of native heart without angina pectoris      Current Outpatient Medications   Medication Sig Dispens symptoms worsen. Verbalized understanding of instructions and agreeable to this plan of care. Seasonal allergiesPrednisone burst. Change OTC allergy medication to new brand. Flonase BID.  Instructed to notify office if not improved in 3-4 days or if sy

## 2021-04-30 NOTE — TELEPHONE ENCOUNTER
Medication refilled per protocol.      Requested Prescriptions     Signed Prescriptions Disp Refills   • LOSARTAN 100 MG Oral Tab 90 tablet 1     Sig: TAKE 1 TABLET BY MOUTH  DAILY     Authorizing Provider: Tia Gabriel     Ordering User: Handy Chavez

## 2021-05-05 ENCOUNTER — HOSPITAL ENCOUNTER (OUTPATIENT)
Dept: CT IMAGING | Facility: HOSPITAL | Age: 66
Discharge: HOME | End: 2021-05-05
Attending: NURSE PRACTITIONER
Payer: MEDICARE

## 2021-05-05 DIAGNOSIS — R91.8 MULTIPLE LUNG NODULES ON CT: ICD-10-CM

## 2021-05-05 DIAGNOSIS — Z87.891 FORMER SMOKER: ICD-10-CM

## 2021-05-05 PROCEDURE — 71250 CT THORAX DX C-: CPT | Performed by: NURSE PRACTITIONER

## 2021-05-06 PROBLEM — R91.8 LUNG NODULE, MULTIPLE: Status: ACTIVE | Noted: 2021-05-06

## 2021-05-13 DIAGNOSIS — N95.1 MENOPAUSAL AND FEMALE CLIMACTERIC STATES: ICD-10-CM

## 2021-05-13 RX ORDER — CITALOPRAM 20 MG/1
20 TABLET ORAL DAILY
Qty: 90 TABLET | Refills: 1 | Status: SHIPPED | OUTPATIENT
Start: 2021-05-13 | End: 2021-10-03

## 2021-05-13 NOTE — TELEPHONE ENCOUNTER
Refill request for:    Requested Prescriptions     Pending Prescriptions Disp Refills   • CITALOPRAM HYDROBROMIDE 20 MG Oral Tab [Pharmacy Med Name: CITALOPRAM  20MG  TAB] 90 tablet 3     Sig: TAKE 1 TABLET BY MOUTH  DAILY        Last Prescribed Quantity R

## 2021-05-20 DIAGNOSIS — I47.1 PAROXYSMAL SVT (SUPRAVENTRICULAR TACHYCARDIA) (HCC): ICD-10-CM

## 2021-05-20 RX ORDER — DILTIAZEM HYDROCHLORIDE 180 MG/1
CAPSULE, COATED, EXTENDED RELEASE ORAL
Qty: 180 CAPSULE | Refills: 0 | Status: SHIPPED | OUTPATIENT
Start: 2021-05-20 | End: 2021-07-21

## 2021-05-20 NOTE — TELEPHONE ENCOUNTER
Requested Prescriptions     Pending Prescriptions Disp Refills   • DILTIAZEM HCL ER COATED BEADS 180 MG Oral Capsule SR 24 Hr [Pharmacy Med Name: DILTIAZEM  180MG  CAP  24 HR CD] 180 capsule 1     Sig: TAKE 1 CAPSULE BY MOUTH  TWICE DAILY     LOV 2/1/2021

## 2021-07-18 DIAGNOSIS — I25.10 CORONARY ARTERY DISEASE INVOLVING NATIVE CORONARY ARTERY OF NATIVE HEART WITHOUT ANGINA PECTORIS: ICD-10-CM

## 2021-07-18 DIAGNOSIS — E78.00 PURE HYPERCHOLESTEROLEMIA: ICD-10-CM

## 2021-07-18 DIAGNOSIS — I47.1 PAROXYSMAL SVT (SUPRAVENTRICULAR TACHYCARDIA) (HCC): ICD-10-CM

## 2021-07-21 RX ORDER — DILTIAZEM HYDROCHLORIDE 180 MG/1
CAPSULE, COATED, EXTENDED RELEASE ORAL
Qty: 180 CAPSULE | Refills: 0 | Status: SHIPPED | OUTPATIENT
Start: 2021-07-21 | End: 2021-08-06

## 2021-07-21 RX ORDER — ROSUVASTATIN CALCIUM 5 MG/1
TABLET, COATED ORAL
Qty: 90 TABLET | Refills: 0 | Status: SHIPPED | OUTPATIENT
Start: 2021-07-21 | End: 2021-11-24

## 2021-07-21 NOTE — TELEPHONE ENCOUNTER
Requested Prescriptions     Pending Prescriptions Disp Refills   • DILTIAZEM HCL ER COATED BEADS 180 MG Oral Capsule SR 24 Hr [Pharmacy Med Name: DILTIAZEM  180MG  CAP  24 HR CD] 180 capsule 3     Sig: TAKE 1 CAPSULE BY MOUTH  TWICE DAILY   • ROSUVASTATIN

## 2021-08-06 DIAGNOSIS — I10 ESSENTIAL HYPERTENSION: ICD-10-CM

## 2021-08-06 DIAGNOSIS — I47.1 PAROXYSMAL SVT (SUPRAVENTRICULAR TACHYCARDIA) (HCC): ICD-10-CM

## 2021-08-06 RX ORDER — DILTIAZEM HYDROCHLORIDE 180 MG/1
CAPSULE, EXTENDED RELEASE ORAL
Qty: 180 CAPSULE | Refills: 0 | Status: SHIPPED | OUTPATIENT
Start: 2021-08-06 | End: 2021-11-23

## 2021-08-06 RX ORDER — LOSARTAN POTASSIUM 100 MG/1
TABLET ORAL
Qty: 90 TABLET | Refills: 0 | Status: SHIPPED | OUTPATIENT
Start: 2021-08-06 | End: 2021-11-23

## 2021-08-06 NOTE — TELEPHONE ENCOUNTER
Medication refilled per protocol.      Requested Prescriptions     Signed Prescriptions Disp Refills   • LOSARTAN 100 MG Oral Tab 90 tablet 0     Sig: TAKE 1 TABLET BY MOUTH  DAILY     Authorizing Provider: Valeriy Bell     Ordering User: Louise Bliss

## 2021-08-12 ENCOUNTER — HOSPITAL ENCOUNTER (OUTPATIENT)
Dept: GENERAL RADIOLOGY | Age: 66
Discharge: HOME | End: 2021-08-12
Attending: FAMILY MEDICINE
Payer: MEDICARE

## 2021-08-12 ENCOUNTER — OFFICE VISIT (OUTPATIENT)
Dept: FAMILY MEDICINE CLINIC | Facility: CLINIC | Age: 66
End: 2021-08-12
Payer: MEDICARE

## 2021-08-12 VITALS
RESPIRATION RATE: 16 BRPM | HEART RATE: 100 BPM | OXYGEN SATURATION: 98 % | DIASTOLIC BLOOD PRESSURE: 74 MMHG | HEIGHT: 68 IN | TEMPERATURE: 98 F | SYSTOLIC BLOOD PRESSURE: 126 MMHG | BODY MASS INDEX: 24.25 KG/M2 | WEIGHT: 160 LBS

## 2021-08-12 DIAGNOSIS — M25.551 RIGHT HIP PAIN: ICD-10-CM

## 2021-08-12 DIAGNOSIS — M25.551 RIGHT HIP PAIN: Primary | ICD-10-CM

## 2021-08-12 DIAGNOSIS — M25.511 CHRONIC RIGHT SHOULDER PAIN: ICD-10-CM

## 2021-08-12 DIAGNOSIS — G89.29 CHRONIC RIGHT SHOULDER PAIN: ICD-10-CM

## 2021-08-12 DIAGNOSIS — I47.1 PSVT (PAROXYSMAL SUPRAVENTRICULAR TACHYCARDIA) (HCC): ICD-10-CM

## 2021-08-12 PROCEDURE — 99214 OFFICE O/P EST MOD 30 MIN: CPT | Performed by: FAMILY MEDICINE

## 2021-08-12 PROCEDURE — 93000 ELECTROCARDIOGRAM COMPLETE: CPT | Performed by: FAMILY MEDICINE

## 2021-08-12 PROCEDURE — 73502 X-RAY EXAM HIP UNI 2-3 VIEWS: CPT | Performed by: FAMILY MEDICINE

## 2021-08-12 NOTE — PROGRESS NOTES
Chief Complaint:  Patient presents with:  Shoulder Pain: Pain on Right Shoulder for 1.5 years   Hip Pain: Pain on Right Hip since May 2021      HPI:  This is a 72year old female patient presenting for Shoulder Pain (Pain on Right Shoulder for 1.5 years ) Scan Completed  Pneumococcal Vaccine: 65+ Years Completed  Zoster Vaccines Completed    ROS: Review of systems performed and negative unless stated in HPI. Past medical, family and social history reviewed and listed as below.     HISTORY:  Past Medical H Tab TAKE 1 TABLET BY MOUTH AT  NIGHT 90 tablet 0   • Citalopram Hydrobromide 20 MG Oral Tab Take 1 tablet (20 mg total) by mouth daily. 90 tablet 1   • aspirin 81 MG Oral Tab EC Take 81 mg by mouth daily.      • Cyanocobalamin (VITAMIN B12 OR) Take 2,500 mc Cardiovascular    PSVT (paroxysmal supraventricular tachycardia) (HCC)    Relevant Orders    ELECTROCARDIOGRAM, COMPLETE      Other Visit Diagnoses     Right hip pain    -  Primary    Relevant Orders    XR HIP W OR WO PELVIS 2 OR 3 VIEWS, RIGHT (CPT=73502)

## 2021-08-16 ENCOUNTER — PATIENT MESSAGE (OUTPATIENT)
Dept: FAMILY MEDICINE CLINIC | Facility: CLINIC | Age: 66
End: 2021-08-16

## 2021-08-16 DIAGNOSIS — M25.551 RIGHT HIP PAIN: Primary | ICD-10-CM

## 2021-08-16 NOTE — TELEPHONE ENCOUNTER
From: Jaylen Williamson  To: Carline Springer DO  Sent: 8/16/2021 6:07 AM CDT  Subject: Referral Request    Who would you suggest for an Orthopedic. Please let me know.  Thanks, [employer]

## 2021-08-18 ENCOUNTER — TELEPHONE (OUTPATIENT)
Dept: ORTHOPEDICS CLINIC | Facility: CLINIC | Age: 66
End: 2021-08-18

## 2021-08-18 NOTE — TELEPHONE ENCOUNTER
Let's have you see Dr. Vinayak Jacobs. IF he is unavailable, then Dr. Issa Hall would be a good alternative. Please let me know if you have any questions.   Marly Maravilla,  8/18/2021 5:31 AM

## 2021-08-18 NOTE — TELEPHONE ENCOUNTER
8/12/21  XR HIP W OR WO PELVIS 2 OR 3 VIEWS, RIGHT   FINDINGS:  The hips appear symmetric on the AP pelvic radiograph.  Normal smooth contour of the femoral heads.  Small acetabular spurs.  No fracture, expansile lesion or erosion.  The pelvic ring appears

## 2021-08-18 NOTE — TELEPHONE ENCOUNTER
Patient coming in for right hip pain, last images were done on 8/21.  Please advise is additional views are needed    Future Appointments   Date Time Provider Prema Estrella   9/1/2021  8:20 AM Kimberly Ayon MD [facility]O [facility]   9/27/2021  8:40

## 2021-09-01 ENCOUNTER — OFFICE VISIT (OUTPATIENT)
Dept: ORTHOPEDICS CLINIC | Facility: CLINIC | Age: 66
End: 2021-09-01
Payer: MEDICARE

## 2021-09-01 DIAGNOSIS — M76.01 GLUTEAL TENDINITIS OF RIGHT BUTTOCK: Primary | ICD-10-CM

## 2021-09-01 DIAGNOSIS — M46.1 SACROILIITIS (HCC): ICD-10-CM

## 2021-09-01 PROCEDURE — 99203 OFFICE O/P NEW LOW 30 MIN: CPT | Performed by: ORTHOPAEDIC SURGERY

## 2021-09-01 NOTE — PROGRESS NOTES
[facility] New Consult    CC: Patient presents with:  Hip Pain: Patient is here today for right hip pain that started about 3 months ago. Patient states that she doesn't remember a specific event that caused the pain.        HPI: The patient is BY MOUTH  TWICE DAILY 180 capsule 0   • ROSUVASTATIN CALCIUM 5 MG Oral Tab TAKE 1 TABLET BY MOUTH AT  NIGHT 90 tablet 0   • Citalopram Hydrobromide 20 MG Oral Tab Take 1 tablet (20 mg total) by mouth daily.  90 tablet 1   • aspirin 81 MG Oral Tab EC Take 81 Male       ROS:  Complete ROS reviewed by me and non-contributory to the chief complaint except as mentioned above. Physical Exam:    LMP  (LMP Unknown)   On examination she is a pleasant 66-year-old female ambulating without antalgia.   She has excellen Strengthening may be irritating for now and should be avoided. Any positions that during her yoga classes that recreate symptoms that should also be eliminated for now.   Therapy is therefore prescribed twice a week for the next 4 to 6 weeks with a follow-

## 2021-09-19 DIAGNOSIS — N95.1 MENOPAUSAL AND FEMALE CLIMACTERIC STATES: ICD-10-CM

## 2021-09-20 RX ORDER — CITALOPRAM 20 MG/1
TABLET ORAL
Qty: 90 TABLET | Refills: 3 | OUTPATIENT
Start: 2021-09-20

## 2021-09-20 NOTE — TELEPHONE ENCOUNTER
Refill request for:    Requested Prescriptions     Pending Prescriptions Disp Refills   • CITALOPRAM 20 MG Oral Tab [Pharmacy Med Name: CITALOPRAM  20MG  TAB] 90 tablet 3     Sig: TAKE 1 TABLET BY MOUTH  DAILY        Last Prescribed Quantity Refills   5/13

## 2021-09-26 DIAGNOSIS — N95.1 MENOPAUSAL AND FEMALE CLIMACTERIC STATES: ICD-10-CM

## 2021-09-28 NOTE — PROGRESS NOTES
[facility]  Obstetrics and Gynecology  Follow Up Progress Note    Subjective: Kenny Chavez is a 72year old [place] female who was last seen in office 03/08/2021 for repeat ECC.  She had a colposcopy with cervical biopsy and ECC on 03/01/ HIVES  Beta Adrenergic Blo*    HIVES    Comment:Atenolo, Toprol. Duloxetine              OTHER (SEE COMMENTS)  Penicillins             HIVES  Sulfa Antibiotics       SWELLING    Comment:Other reaction(s):  Other (See Comments)             Severe gita repeat pap smear with ECC     Patient Active Problem List:     Neuropathy     HTN (hypertension), benign     HLD (hyperlipidemia)     PSVT (paroxysmal supraventricular tachycardia) (HCC)     ASCUS with positive high risk HPV cervical     BITA I (cervical in

## 2021-09-29 ENCOUNTER — OFFICE VISIT (OUTPATIENT)
Dept: OBGYN CLINIC | Facility: CLINIC | Age: 66
End: 2021-09-29
Payer: MEDICARE

## 2021-09-29 VITALS
DIASTOLIC BLOOD PRESSURE: 60 MMHG | WEIGHT: 162.81 LBS | BODY MASS INDEX: 24.68 KG/M2 | SYSTOLIC BLOOD PRESSURE: 130 MMHG | HEIGHT: 68 IN

## 2021-09-29 DIAGNOSIS — R87.610 ASCUS WITH POSITIVE HIGH RISK HPV CERVICAL: ICD-10-CM

## 2021-09-29 DIAGNOSIS — R87.810 ASCUS WITH POSITIVE HIGH RISK HPV CERVICAL: ICD-10-CM

## 2021-09-29 DIAGNOSIS — Z87.410 HISTORY OF CERVICAL DYSPLASIA: Primary | ICD-10-CM

## 2021-09-29 DIAGNOSIS — Z12.4 ENCOUNTER FOR SCREENING FOR CERVICAL CANCER: ICD-10-CM

## 2021-09-29 PROCEDURE — 99213 OFFICE O/P EST LOW 20 MIN: CPT | Performed by: OBSTETRICS & GYNECOLOGY

## 2021-09-29 PROCEDURE — 87624 HPV HI-RISK TYP POOLED RSLT: CPT | Performed by: OBSTETRICS & GYNECOLOGY

## 2021-09-29 PROCEDURE — 88175 CYTOPATH C/V AUTO FLUID REDO: CPT | Performed by: OBSTETRICS & GYNECOLOGY

## 2021-09-29 PROCEDURE — 87625 HPV TYPES 16 & 18 ONLY: CPT | Performed by: OBSTETRICS & GYNECOLOGY

## 2021-09-29 PROCEDURE — 57505 ENDOCERVICAL CURETTAGE: CPT | Performed by: OBSTETRICS & GYNECOLOGY

## 2021-09-29 PROCEDURE — 88305 TISSUE EXAM BY PATHOLOGIST: CPT | Performed by: OBSTETRICS & GYNECOLOGY

## 2021-09-29 NOTE — PATIENT INSTRUCTIONS
Please return in 01/2022 for your annual gyne visit or sooner if having abnormal vaginal bleeding or severe pelvic pain

## 2021-09-29 NOTE — PROCEDURES
ECC Procedure Note    Date of Procedure: 09/29/21      Indications: 72year old [place] female who was last seen in office 03/08/2021 for repeat ECC. She had a colposcopy with cervical biopsy and ECC on 03/01/21 2/2 ASCUS, HPV positive.  Her cervical biopsy Marlon Fothergill, MD   [facility] - OBGYN

## 2021-10-03 RX ORDER — CITALOPRAM 20 MG/1
TABLET ORAL
Qty: 90 TABLET | Refills: 3 | Status: SHIPPED | OUTPATIENT
Start: 2021-10-03 | End: 2021-11-23

## 2021-10-04 ENCOUNTER — OFFICE VISIT (OUTPATIENT)
Dept: PHYSICAL THERAPY | Facility: HOSPITAL | Age: 66
End: 2021-10-04
Attending: ORTHOPAEDIC SURGERY
Payer: MEDICARE

## 2021-10-04 DIAGNOSIS — M76.01 GLUTEAL TENDINITIS OF RIGHT BUTTOCK: ICD-10-CM

## 2021-10-04 DIAGNOSIS — M46.1 SACROILIITIS (HCC): ICD-10-CM

## 2021-10-04 PROCEDURE — 97140 MANUAL THERAPY 1/> REGIONS: CPT

## 2021-10-04 PROCEDURE — 97161 PT EVAL LOW COMPLEX 20 MIN: CPT

## 2021-10-04 NOTE — PROGRESS NOTES
LOWER EXTREMITY EVALUATION:   Referring Physician: Dr. Tutu Madrid  Diagnosis: R Hip pain     Date of Service: 10/4/2021     PATIENT SUMMARY   Qian Costello is a 72year old female who presents to therapy today with complaints of posterior right hip pain. (hypertension), Human papilloma virus infection (2017), Indigestion, Irregular bowel habits, Nausea, Neuropathy, Night sweats, Paroxysmal SVT (supraventricular tachycardia) ([facility] [address] Sleep apnea, and Wears glasses.     Kemar Zee presents to physical 70  Active SLR: R -, L -  SI Cluster:  SI Distraction Provocation: -  SI Compression Provocation: -  Thigh Thrust: -  Gaenslen's Provocation: NT  Sacral Thrust: -  Direct palpation of posterior SI ligament: R -, L -    Gait: pt ambulates on level ground wi referral. Please co-sign or sign and return this letter via fax as soon as possible to [phone].  If you have any questions, please contact me at Dept: [phone]    Sincerely,  Electronically signed by therapist: Jamelle Severin  Physician's certificatio

## 2021-10-06 ENCOUNTER — MED REC SCAN ONLY (OUTPATIENT)
Dept: OBGYN CLINIC | Facility: CLINIC | Age: 66
End: 2021-10-06

## 2021-10-06 ENCOUNTER — OFFICE VISIT (OUTPATIENT)
Dept: PHYSICAL THERAPY | Facility: HOSPITAL | Age: 66
End: 2021-10-06
Attending: ORTHOPAEDIC SURGERY
Payer: MEDICARE

## 2021-10-06 PROCEDURE — 97110 THERAPEUTIC EXERCISES: CPT

## 2021-10-06 PROCEDURE — 97140 MANUAL THERAPY 1/> REGIONS: CPT

## 2021-10-06 NOTE — PROGRESS NOTES
Dx:  R Hip pain         Insurance (Authorized # of Visits):  6           Authorizing Physician: Dr. Colten Mccracken MD visit: none scheduled  Fall Risk: standard         Precautions: n/a             Subjective: Patient denies any soreness following the evaluatio

## 2021-10-08 ENCOUNTER — TELEPHONE (OUTPATIENT)
Dept: OBGYN CLINIC | Facility: CLINIC | Age: 66
End: 2021-10-08

## 2021-10-08 NOTE — TELEPHONE ENCOUNTER
Contacted patient. Patient informed of results. Patient informed of negative intraepithelial lesion and malignancy on cytology. Patient report of positive HPV but negative 16/18/25. Endocervical sampling reviewed.  Pathology noted for scant degenerated cell

## 2021-10-11 ENCOUNTER — OFFICE VISIT (OUTPATIENT)
Dept: PHYSICAL THERAPY | Facility: HOSPITAL | Age: 66
End: 2021-10-11
Attending: ORTHOPAEDIC SURGERY
Payer: MEDICARE

## 2021-10-11 PROCEDURE — 97140 MANUAL THERAPY 1/> REGIONS: CPT

## 2021-10-11 PROCEDURE — 97110 THERAPEUTIC EXERCISES: CPT

## 2021-10-11 NOTE — PROGRESS NOTES
Dx: R Hip pain         Insurance (Authorized # of Visits):  6           Authorizing Physician: Dr. Papi Nguyen  Next MD visit: none scheduled  Fall Risk: standard         Precautions: n/a             Subjective: Patient denies any changes in her symptoms.  She wou rolling  Total time: 30 minutes      TE  Isometric hip adduction 2x10x3 sec  Bridge with add 2x10  Squat with lateral hip glide 2x10  Cross over stretch 5x10 sec   TE  Clam shell 2x15  Cross over stretch 6x10 sec      HEP: Supine clam shell; hip flexor str

## 2021-10-13 ENCOUNTER — OFFICE VISIT (OUTPATIENT)
Dept: PHYSICAL THERAPY | Facility: HOSPITAL | Age: 66
End: 2021-10-13
Attending: ORTHOPAEDIC SURGERY
Payer: MEDICARE

## 2021-10-13 PROCEDURE — 97110 THERAPEUTIC EXERCISES: CPT

## 2021-10-13 PROCEDURE — 97140 MANUAL THERAPY 1/> REGIONS: CPT

## 2021-10-13 NOTE — PROGRESS NOTES
Dx:  R Hip pain         Insurance (Authorized # of Visits):  6           Authorizing Physician: Dr. Benito Sawant  Next MD visit: none scheduled  Fall Risk: standard         Precautions: n/a             Subjective: Patient reports that she is doing slightly better t 2x10  Cross over stretch 5x10 sec   TE  Clam shell 2x15  Cross over stretch 6x10 sec TE  Clam shell against PT x 10  DKTC on ball with bridge 2x10  Lateral step up x 15     HEP: Supine clam shell; hip flexor stretch    Charges: TE x 1; Manual x 2       Tot

## 2021-10-19 ENCOUNTER — OFFICE VISIT (OUTPATIENT)
Dept: PHYSICAL THERAPY | Facility: HOSPITAL | Age: 66
End: 2021-10-19
Attending: ORTHOPAEDIC SURGERY
Payer: MEDICARE

## 2021-10-19 PROCEDURE — 97140 MANUAL THERAPY 1/> REGIONS: CPT

## 2021-10-19 PROCEDURE — 97110 THERAPEUTIC EXERCISES: CPT

## 2021-10-19 NOTE — PROGRESS NOTES
Dx:  R Hip pain         Insurance (Authorized # of Visits):  6           Authorizing Physician: Dr. Russ Cifuentes  Next MD visit: none scheduled  Fall Risk: standard         Precautions: n/a             Subjective: Patient was very busy over the weekend with lots of with ER  Glute STM  IT-Band rolling  Total time: 25 minutes    TE  Isometric hip adduction 2x10x3 sec  Bridge with add 2x10  Squat with lateral hip glide 2x10  Cross over stretch 5x10 sec   TE  Clam shell 2x15  Cross over stretch 6x10 sec TE  Clam shell ag

## 2021-10-20 ENCOUNTER — TELEPHONE (OUTPATIENT)
Dept: FAMILY MEDICINE CLINIC | Facility: CLINIC | Age: 66
End: 2021-10-20

## 2021-10-21 ENCOUNTER — OFFICE VISIT (OUTPATIENT)
Dept: PHYSICAL THERAPY | Facility: HOSPITAL | Age: 66
End: 2021-10-21
Attending: ORTHOPAEDIC SURGERY
Payer: MEDICARE

## 2021-10-21 PROCEDURE — 97140 MANUAL THERAPY 1/> REGIONS: CPT

## 2021-10-21 PROCEDURE — 97110 THERAPEUTIC EXERCISES: CPT

## 2021-10-21 NOTE — PROGRESS NOTES
Dx:  R Hip pain         Insurance (Authorized # of Visits):  6           Authorizing Physician: Dr. Papi Nguyen  Next MD visit: none scheduled  Fall Risk: standard         Precautions: n/a             Subjective: Patient reports that her hip is not as bad today as Manual  JOAN distraction bouts  Long axis bouts  Lateral hip glide with ER  Glute STM  IT-Band rolling  Total time: 25 minutes Manual  JOAN distraction bouts- DC  Long axis bouts 3x60 sec  Lateral hip glide with movement  Total time: 20 minutes   TE  Iso

## 2021-10-25 ENCOUNTER — OFFICE VISIT (OUTPATIENT)
Dept: FAMILY MEDICINE CLINIC | Facility: CLINIC | Age: 66
End: 2021-10-25
Payer: MEDICARE

## 2021-10-25 VITALS
HEIGHT: 68.25 IN | HEART RATE: 84 BPM | SYSTOLIC BLOOD PRESSURE: 146 MMHG | WEIGHT: 159.81 LBS | TEMPERATURE: 98 F | BODY MASS INDEX: 24.22 KG/M2 | RESPIRATION RATE: 14 BRPM | DIASTOLIC BLOOD PRESSURE: 64 MMHG

## 2021-10-25 DIAGNOSIS — Z13.31 DEPRESSION SCREENING: ICD-10-CM

## 2021-10-25 DIAGNOSIS — Z12.31 VISIT FOR SCREENING MAMMOGRAM: ICD-10-CM

## 2021-10-25 DIAGNOSIS — Z00.00 ENCOUNTER FOR ANNUAL HEALTH EXAMINATION: Primary | ICD-10-CM

## 2021-10-25 DIAGNOSIS — R91.1 INCIDENTAL LUNG NODULE, GREATER THAN OR EQUAL TO 8MM: ICD-10-CM

## 2021-10-25 DIAGNOSIS — R05.9 COUGH: ICD-10-CM

## 2021-10-25 PROCEDURE — G0438 PPPS, INITIAL VISIT: HCPCS | Performed by: FAMILY MEDICINE

## 2021-10-25 PROCEDURE — G0444 DEPRESSION SCREEN ANNUAL: HCPCS | Performed by: FAMILY MEDICINE

## 2021-10-25 RX ORDER — PREDNISONE 20 MG/1
40 TABLET ORAL DAILY
Qty: 10 TABLET | Refills: 0 | Status: SHIPPED | OUTPATIENT
Start: 2021-10-25 | End: 2021-10-30

## 2021-10-25 NOTE — PATIENT INSTRUCTIONS
Christopher Guajardo's SCREENING SCHEDULE   Tests on this list are recommended by your physician but may not be covered, or covered at this frequency, by your insurer. Please check with your insurance carrier before scheduling to verify coverage.    PREVEN 10/20/2020      No recommendations at this time   Pap and Pelvic    Pap   Covered every 2 years for women at normal risk;  Annually if at high risk -  No recommendations at this time    Chlamydia Annually if high risk -  No recommendations at this time   Sc link to the [email or web address]. This site has a lot of good information including definitions of the different types of Advance Directives.  It also has the State forms available on it's website for anyone to review and print using their home co

## 2021-10-25 NOTE — PROGRESS NOTES
HPI:   Jneny Terrell is a 77year old female who presents for a Medicare Subsequent Annual Wellness visit (Pt already had Initial Annual Wellness). Current concerns:  Dry cough, sensation of mucus in back of throat.  Starts once she brushes her alanna months and older PFS 0.5 ml (33338)                          10/13/2014  10/19/2015  10/04/2016                            10/26/2017  10/03/2018  09/09/2019      Fluarix 6 Months And Older 0.5 ml prefilled syringe (39023)                          09/09/20 and is low risk. Cognitive Assessment   She had a completely normal cognitive assessment- see flowsheet entries    Functional Ability/Status   Irizarry Quitter has a completely normal functional assessment! Please see flow sheet section for details. Trigger ring finger of left hand     Preventative health care     Colon polyp     Abdominal pain, epigastric     Flatulence, eructation, and gas pain     Nausea     Papanicolaou smear of cervix with low grade squamous intraepithelial lesion (LGSIL)     Cor 1 tablet by mouth daily. multivitamin Oral Tab, Take 1 tablet by mouth daily.        MEDICAL INFORMATION:   She  has a past medical history of Abdominal distention, Abdominal pain, Belching, Bloating, Chest pain, Constipation, Diarrhea, unspecified, Esse Temp 98 °F (36.7 °C) (Tympanic)   Resp 14   Ht 5' 8.25\" (1.734 m)   Wt 159 lb 12.8 oz (72.5 kg)   LMP  (LMP Unknown)   BMI 24.12 kg/m²  Estimated body mass index is 24.12 kg/m² as calculated from the following:    Height as of this encounter: 5' 8.25\" (1 Administered Date(s) Administered   • Covid-19 Vaccine Moderna 100 mcg/0.5 ml 02/17/2021, 03/18/2021, 10/22/2021   • Depo-Medrol 40mg Inj 11/16/2018   • FLU VAC High Dose 65 YRS & Older PRSV Free (26371) 10/15/2020, 09/22/2021   • FLULAVAL 6 months & old plan.  Reinforced healthy diet, lifestyle, and exercise. RTC in 6 months.     Isra Galo DO, 10/25/2021     General Health     In the past six months, have you lost more than 10 pounds without trying?: 2 - No  Has your appetite been poor?: No  How do following:    Colonoscopy   Covered every 10 years    Covered every 2 years if patient is at high risk or previous colonoscopy was abnormal 04/18/2019    Colonoscopy due on 04/18/2024    Flexible Sigmoidoscopy   Covered every 4 years -    Fecal Occult Bloo Component Value Date    K 4.3 10/06/2020         Creatinine   Annually Lab Results   Component Value Date    CREATSERUM 0.80 10/06/2020         BUN Annually Lab Results   Component Value Date    BUN 10 10/06/2020       Drug Serum Conc Annually No results

## 2021-10-26 ENCOUNTER — OFFICE VISIT (OUTPATIENT)
Dept: PHYSICAL THERAPY | Facility: HOSPITAL | Age: 66
End: 2021-10-26
Attending: ORTHOPAEDIC SURGERY
Payer: MEDICARE

## 2021-10-26 PROCEDURE — 97110 THERAPEUTIC EXERCISES: CPT

## 2021-10-26 PROCEDURE — 97140 MANUAL THERAPY 1/> REGIONS: CPT

## 2021-10-26 NOTE — PROGRESS NOTES
Dx:  R Hip pain- lateral/posterior         Insurance (Authorized # of Visits):  6           Authorizing Physician: Dr. Charlie James  Next MD visit: none scheduled  Fall Risk: standard         Precautions: n/a             Subjective: Patient reports minimal improvem distraction bouts  Long axis bouts  Lateral hip glide with ER  Glute STM  IT-Band rolling  Total time: 25 minutes Manual  JOAN distraction bouts- DC  Long axis bouts 3x60 sec  Lateral hip glide with movement  Total time: 20 minutes Manual  Long axis distr

## 2021-10-27 ENCOUNTER — LAB ENCOUNTER (OUTPATIENT)
Dept: LAB | Facility: HOSPITAL | Age: 66
End: 2021-10-27
Attending: FAMILY MEDICINE
Payer: MEDICARE

## 2021-10-27 ENCOUNTER — TELEPHONE (OUTPATIENT)
Dept: PHYSICAL THERAPY | Facility: HOSPITAL | Age: 66
End: 2021-10-27

## 2021-10-27 DIAGNOSIS — Z00.00 ENCOUNTER FOR ANNUAL HEALTH EXAMINATION: ICD-10-CM

## 2021-10-27 PROCEDURE — 80053 COMPREHEN METABOLIC PANEL: CPT

## 2021-10-27 PROCEDURE — 85025 COMPLETE CBC W/AUTO DIFF WBC: CPT

## 2021-10-27 PROCEDURE — 36415 COLL VENOUS BLD VENIPUNCTURE: CPT

## 2021-10-27 PROCEDURE — 80061 LIPID PANEL: CPT

## 2021-10-28 ENCOUNTER — APPOINTMENT (OUTPATIENT)
Dept: PHYSICAL THERAPY | Facility: HOSPITAL | Age: 66
End: 2021-10-28
Attending: ORTHOPAEDIC SURGERY
Payer: MEDICARE

## 2021-11-01 ENCOUNTER — TELEPHONE (OUTPATIENT)
Dept: ORTHOPEDICS CLINIC | Facility: CLINIC | Age: 66
End: 2021-11-01

## 2021-11-01 DIAGNOSIS — M76.01 GLUTEAL TENDINITIS OF RIGHT BUTTOCK: Primary | ICD-10-CM

## 2021-11-01 DIAGNOSIS — M46.1 SACROILIITIS (HCC): ICD-10-CM

## 2021-11-01 NOTE — TELEPHONE ENCOUNTER
Patient has not gotten pain relief from OT for her right hip gluteus tendonitis and sacrolitis. Dr Sofi Barkley advised if she did not get relief she could be referred to pain management. Who does he recommend for pain management? Please advise.

## 2021-11-01 NOTE — TELEPHONE ENCOUNTER
Patient informed of below, verbalized understanding.    Referral placed for pain management  Dr Stephania Camp   [address] [place] [address] [phone]

## 2021-11-04 ENCOUNTER — OFFICE VISIT (OUTPATIENT)
Dept: PAIN CLINIC | Facility: CLINIC | Age: 66
End: 2021-11-04
Payer: MEDICARE

## 2021-11-04 VITALS
DIASTOLIC BLOOD PRESSURE: 82 MMHG | OXYGEN SATURATION: 99 % | BODY MASS INDEX: 24 KG/M2 | SYSTOLIC BLOOD PRESSURE: 148 MMHG | WEIGHT: 159 LBS | HEART RATE: 78 BPM

## 2021-11-04 DIAGNOSIS — M25.551 HIP PAIN, CHRONIC, RIGHT: Primary | ICD-10-CM

## 2021-11-04 DIAGNOSIS — G89.29 HIP PAIN, CHRONIC, RIGHT: Primary | ICD-10-CM

## 2021-11-04 PROCEDURE — 99204 OFFICE O/P NEW MOD 45 MIN: CPT | Performed by: ANESTHESIOLOGY

## 2021-11-04 RX ORDER — MELOXICAM 15 MG/1
15 TABLET ORAL DAILY
Qty: 30 TABLET | Refills: 0 | Status: SHIPPED | OUTPATIENT
Start: 2021-11-04 | End: 2022-01-19

## 2021-11-04 NOTE — H&P
Name: Salina Wren   : 10/13/1955   DOS: 2021     Chief complaint: Right hip pain    History of present illness:  Salina Wren is a 77year old female with a history of hypertension, hyperlipidemia who presents today for evaluation of 5 m TWICE DAILY 180 capsule 0   • ROSUVASTATIN CALCIUM 5 MG Oral Tab TAKE 1 TABLET BY MOUTH AT  NIGHT 90 tablet 0   • aspirin 81 MG Oral Tab EC Take 81 mg by mouth daily. • Cyanocobalamin (VITAMIN B12 OR) Take 2,500 mcg by mouth daily.        • Cholecalcife Extension:     5    5  Wrist Flexsion:                 5    5  Finger Extension:     5    5  Finger Flexsion:      5    5    Cardiovascular system: No peripheral edema  Respiratory system: Speech  Abdomen: Soft, nontender, no organomegaly.  Bowel sounds pos

## 2021-11-04 NOTE — PROGRESS NOTES
Subjective:   Patient ID: Jenny Terrell is a 77year old female.     HPI    History/Other:   Review of Systems  Current Outpatient Medications   Medication Sig Dispense Refill   • CITALOPRAM 20 MG Oral Tab TAKE 1 TABLET BY MOUTH  DAILY 90 tablet 3   • L (No Pain) 0  to  10 (Worst Pain)                 Minimum Pain:   0  Maximum Pain  10    Distribution of Pain:    right    Quality of Pain:   aching, sharp/stabbing and throbbing    Orig

## 2021-11-05 RX ORDER — MELOXICAM 15 MG/1
TABLET ORAL
Qty: 90 TABLET | Refills: 0 | OUTPATIENT
Start: 2021-11-05

## 2021-11-05 NOTE — TELEPHONE ENCOUNTER
Medication: Meloxicam 15 MG Oral Tab    Date of last refill: E-Prescribing Status: Receipt confirmed by pharmacy (11/4/2021  2:44 PM CDT)      Duplicate order

## 2021-11-10 ENCOUNTER — HOSPITAL ENCOUNTER (OUTPATIENT)
Dept: MRI IMAGING | Age: 66
Discharge: HOME | End: 2021-11-10
Attending: ANESTHESIOLOGY
Payer: MEDICARE

## 2021-11-10 DIAGNOSIS — M25.551 HIP PAIN, CHRONIC, RIGHT: ICD-10-CM

## 2021-11-10 DIAGNOSIS — G89.29 HIP PAIN, CHRONIC, RIGHT: ICD-10-CM

## 2021-11-10 PROCEDURE — 73721 MRI JNT OF LWR EXTRE W/O DYE: CPT | Performed by: ANESTHESIOLOGY

## 2021-11-11 ENCOUNTER — TELEPHONE (OUTPATIENT)
Dept: PAIN CLINIC | Facility: CLINIC | Age: 66
End: 2021-11-11

## 2021-11-11 DIAGNOSIS — M25.551 HIP PAIN, RIGHT: Primary | ICD-10-CM

## 2021-11-11 NOTE — TELEPHONE ENCOUNTER
Question Answer   Anesthesia Type Local   Provider Salina Choi   [facility]   Medical clearance requested (will send to Pain Navigator) No   Patient has Medicare coverage?  Yes   Comments (Please list entire procedure name here.) right hip injection

## 2021-11-11 NOTE — TELEPHONE ENCOUNTER
Pt called to schedule injection. She advised that she got an email from Dr. Oz Gerber to call in and schedule    Please advise.

## 2021-11-11 NOTE — TELEPHONE ENCOUNTER
Patient advised of insurance approval to proceed with injections and is agreeable to scheduling. Patient scheduled for procedure, pre-procedure instructions reviewed. Patient prefers conscious sedation.  Reviewed sedation instructions including fasting of 8 the [employer]. • Please bring your Insurance Card, Photo ID, List of Current Medications and Referral (if applicable) to your appointment. Check in at [facility] ([address]. [place] [address]., [place], [place]) outpatient registration in the [employer]. within 48 hours of the scheduled date, your procedure will be cancelled and rescheduled to a later date. Please contact your insurance carrier to determine what your financial  responsibility will be for the procedure(s).     Cancellation/Rescheduling Hal

## 2021-11-11 NOTE — TELEPHONE ENCOUNTER
Spoke with pt and she wanted to call her  and talk about the procedure/anaesthesia types and call back.

## 2021-11-11 NOTE — TELEPHONE ENCOUNTER
Katarzyna Canales MD  You 2 minutes ago (10:05 AM)     I did call her yesterday.  Referral place for right hip injection

## 2021-11-15 ENCOUNTER — LAB ENCOUNTER (OUTPATIENT)
Dept: LAB | Facility: HOSPITAL | Age: 66
End: 2021-11-15
Attending: ANESTHESIOLOGY
Payer: MEDICARE

## 2021-11-15 DIAGNOSIS — M25.551 HIP PAIN, RIGHT: ICD-10-CM

## 2021-11-18 ENCOUNTER — HOSPITAL ENCOUNTER (OUTPATIENT)
Facility: HOSPITAL | Age: 66
Setting detail: HOSPITAL OUTPATIENT SURGERY
Discharge: HOME | End: 2021-11-18
Attending: ANESTHESIOLOGY | Admitting: ANESTHESIOLOGY
Payer: MEDICARE

## 2021-11-18 ENCOUNTER — APPOINTMENT (OUTPATIENT)
Dept: GENERAL RADIOLOGY | Facility: HOSPITAL | Age: 66
End: 2021-11-18
Attending: ANESTHESIOLOGY
Payer: MEDICARE

## 2021-11-18 VITALS
SYSTOLIC BLOOD PRESSURE: 126 MMHG | HEART RATE: 83 BPM | BODY MASS INDEX: 24.1 KG/M2 | DIASTOLIC BLOOD PRESSURE: 63 MMHG | OXYGEN SATURATION: 99 % | TEMPERATURE: 98 F | HEIGHT: 68 IN | WEIGHT: 159 LBS | RESPIRATION RATE: 15 BRPM

## 2021-11-18 DIAGNOSIS — M25.551 HIP PAIN, RIGHT: ICD-10-CM

## 2021-11-18 PROCEDURE — 99152 MOD SED SAME PHYS/QHP 5/>YRS: CPT | Performed by: ANESTHESIOLOGY

## 2021-11-18 PROCEDURE — 3E0U3BZ INTRODUCTION OF ANESTHETIC AGENT INTO JOINTS, PERCUTANEOUS APPROACH: ICD-10-PCS | Performed by: ANESTHESIOLOGY

## 2021-11-18 PROCEDURE — 3E0U33Z INTRODUCTION OF ANTI-INFLAMMATORY INTO JOINTS, PERCUTANEOUS APPROACH: ICD-10-PCS | Performed by: ANESTHESIOLOGY

## 2021-11-18 PROCEDURE — BQ101ZZ FLUOROSCOPY OF RIGHT HIP USING LOW OSMOLAR CONTRAST: ICD-10-PCS | Performed by: ANESTHESIOLOGY

## 2021-11-18 RX ORDER — ONDANSETRON 2 MG/ML
4 INJECTION INTRAMUSCULAR; INTRAVENOUS ONCE AS NEEDED
Status: DISCONTINUED | OUTPATIENT
Start: 2021-11-18 | End: 2021-11-18

## 2021-11-18 RX ORDER — SODIUM CHLORIDE, SODIUM LACTATE, POTASSIUM CHLORIDE, CALCIUM CHLORIDE 600; 310; 30; 20 MG/100ML; MG/100ML; MG/100ML; MG/100ML
100 INJECTION, SOLUTION INTRAVENOUS CONTINUOUS
Status: DISCONTINUED | OUTPATIENT
Start: 2021-11-18 | End: 2021-11-18

## 2021-11-18 RX ORDER — LIDOCAINE HYDROCHLORIDE 10 MG/ML
INJECTION, SOLUTION EPIDURAL; INFILTRATION; INTRACAUDAL; PERINEURAL
Status: DISCONTINUED | OUTPATIENT
Start: 2021-11-18 | End: 2021-11-18

## 2021-11-18 RX ORDER — METHYLPREDNISOLONE ACETATE 40 MG/ML
INJECTION, SUSPENSION INTRA-ARTICULAR; INTRALESIONAL; INTRAMUSCULAR; SOFT TISSUE
Status: DISCONTINUED | OUTPATIENT
Start: 2021-11-18 | End: 2021-11-18

## 2021-11-18 RX ORDER — MIDAZOLAM HYDROCHLORIDE 1 MG/ML
INJECTION INTRAMUSCULAR; INTRAVENOUS
Status: DISCONTINUED | OUTPATIENT
Start: 2021-11-18 | End: 2021-11-18

## 2021-11-18 RX ORDER — DIPHENHYDRAMINE HYDROCHLORIDE 50 MG/ML
50 INJECTION INTRAMUSCULAR; INTRAVENOUS ONCE AS NEEDED
Status: DISCONTINUED | OUTPATIENT
Start: 2021-11-18 | End: 2021-11-18

## 2021-11-18 NOTE — OPERATIVE REPORT
[facility]  Operative Report  2021     Dois Geno Patient Status:  Hospital Outpatient Surgery    10/13/1955 MRN [id number]   Location [id number] [place] [address] Attending Norberto Lake MD   [facility] [address] [facility] [id number] [phone],

## 2021-11-18 NOTE — H&P
History & Physical Examination    Patient Name: Cassia Watson  MRN: [id number]  CSN: [id number]  YOB: 1955    Pre-Operative Diagnosis:  Hip pain, right [M25.551]    Present Illness: Patient with hip pain for intra-articular hip injection • Essential hypertension    • Flatulence/gas pain/belching    • Heart palpitations    • High cholesterol    • History of cardiac murmur    • HLD (hyperlipidemia)    • HTN (hypertension)    • Human papilloma virus infection 2017   • Indigestion    • Irreg

## 2021-11-23 ENCOUNTER — TELEMEDICINE (OUTPATIENT)
Dept: FAMILY MEDICINE CLINIC | Facility: CLINIC | Age: 66
End: 2021-11-23

## 2021-11-23 DIAGNOSIS — I47.1 PAROXYSMAL SVT (SUPRAVENTRICULAR TACHYCARDIA) (HCC): ICD-10-CM

## 2021-11-23 DIAGNOSIS — R05.9 COUGH: Primary | ICD-10-CM

## 2021-11-23 DIAGNOSIS — I10 ESSENTIAL HYPERTENSION: ICD-10-CM

## 2021-11-23 DIAGNOSIS — N95.1 MENOPAUSAL AND FEMALE CLIMACTERIC STATES: ICD-10-CM

## 2021-11-23 PROCEDURE — 99214 OFFICE O/P EST MOD 30 MIN: CPT | Performed by: FAMILY MEDICINE

## 2021-11-23 RX ORDER — FLUTICASONE PROPIONATE AND SALMETEROL 250; 50 UG/1; UG/1
1 POWDER RESPIRATORY (INHALATION) EVERY 12 HOURS SCHEDULED
Qty: 60 EACH | Refills: 1 | Status: SHIPPED | OUTPATIENT
Start: 2021-11-23

## 2021-11-23 RX ORDER — CITALOPRAM 20 MG/1
20 TABLET ORAL DAILY
Qty: 90 TABLET | Refills: 3 | Status: SHIPPED | OUTPATIENT
Start: 2021-11-23

## 2021-11-23 RX ORDER — LOSARTAN POTASSIUM 100 MG/1
100 TABLET ORAL DAILY
Qty: 90 TABLET | Refills: 1 | Status: SHIPPED | OUTPATIENT
Start: 2021-11-23

## 2021-11-23 RX ORDER — DILTIAZEM HYDROCHLORIDE 180 MG/1
180 CAPSULE, COATED, EXTENDED RELEASE ORAL 2 TIMES DAILY
Qty: 180 CAPSULE | Refills: 1 | Status: SHIPPED | OUTPATIENT
Start: 2021-11-23

## 2021-11-23 NOTE — PROGRESS NOTES
VIDEO VISIT  This visit is conducted using Telemedicine with live, interactive video and audio. Patient has been referred to the [facility] website at [email or web address] to review the yearly Consent to Treat document.     Patient understands and accepts • Belching    • Bloating    • Chest pain    • Constipation    • Diarrhea, unspecified    • Essential hypertension    • Flatulence/gas pain/belching    • Heart palpitations    • High cholesterol    • History of cardiac murmur    • HLD (hyperlipidemia) tablet (15 mg total) by mouth daily. 30 tablet 0   • ROSUVASTATIN CALCIUM 5 MG Oral Tab TAKE 1 TABLET BY MOUTH AT  NIGHT 90 tablet 0   • aspirin 81 MG Oral Tab EC Take 81 mg by mouth daily.      • Cyanocobalamin (VITAMIN B12 OR) Take 2,500 mcg by mouth bianka 180 MG Oral Capsule SR 24 Hr          Advised the following:  Charlie Bernal was seen today for cough.     Diagnoses and all orders for this visit:    Cough  -    Given hx of smoking nad improvement with steroids, will plan for fluticasone-salmeterol (ADVAIR DISKU

## 2021-11-24 DIAGNOSIS — E78.00 PURE HYPERCHOLESTEROLEMIA: ICD-10-CM

## 2021-11-24 DIAGNOSIS — I25.10 CORONARY ARTERY DISEASE INVOLVING NATIVE CORONARY ARTERY OF NATIVE HEART WITHOUT ANGINA PECTORIS: ICD-10-CM

## 2021-11-24 RX ORDER — ROSUVASTATIN CALCIUM 5 MG/1
TABLET, COATED ORAL
Qty: 90 TABLET | Refills: 3 | Status: SHIPPED | OUTPATIENT
Start: 2021-11-24

## 2021-11-24 NOTE — TELEPHONE ENCOUNTER
Requested Prescriptions     Pending Prescriptions Disp Refills   • ROSUVASTATIN 5 MG Oral Tab [Pharmacy Med Name: Rosuvastatin Calcium 5 MG Oral Tablet] 90 tablet 3     Sig: TAKE 1 TABLET BY MOUTH AT  NIGHT     LOV 10/25/2021     Patient was asked to dominick

## 2021-11-29 ENCOUNTER — OFFICE VISIT (OUTPATIENT)
Dept: PAIN CLINIC | Facility: CLINIC | Age: 66
End: 2021-11-29
Payer: MEDICARE

## 2021-11-29 VITALS
DIASTOLIC BLOOD PRESSURE: 74 MMHG | BODY MASS INDEX: 24 KG/M2 | HEART RATE: 88 BPM | WEIGHT: 159 LBS | OXYGEN SATURATION: 95 % | SYSTOLIC BLOOD PRESSURE: 118 MMHG

## 2021-11-29 DIAGNOSIS — M25.551 HIP PAIN, CHRONIC, RIGHT: Primary | ICD-10-CM

## 2021-11-29 DIAGNOSIS — M54.16 LUMBAR RADICULOPATHY: ICD-10-CM

## 2021-11-29 DIAGNOSIS — G89.29 HIP PAIN, CHRONIC, RIGHT: Primary | ICD-10-CM

## 2021-11-29 PROCEDURE — 99214 OFFICE O/P EST MOD 30 MIN: CPT | Performed by: ANESTHESIOLOGY

## 2021-11-29 NOTE — PROGRESS NOTES
Last procedure: RIGHT HIP JOINT INJECTION WITH SEDATION  Date: 11/18/21  Percentage of relief obtained: 15-20%  Duration of relief: current    Current Pain Score: 2

## 2021-11-29 NOTE — PROGRESS NOTES
Name: Ugo Sweet   : 10/13/1955   DOS: 2021     Pain Clinic Follow Up Visit:   Patient presents with:  Procedure Follow Up: RIGHT HIP JOINT INJECTION WITH SEDATION      Ugo Sweet is a 77year old female with a history of chronic rig lungs every 12 (twelve) hours. 60 each 1   • Meloxicam 15 MG Oral Tab Take 1 tablet (15 mg total) by mouth daily. 30 tablet 0   • aspirin 81 MG Oral Tab EC Take 81 mg by mouth daily. • Cyanocobalamin (VITAMIN B12 OR) Take 2,500 mcg by mouth daily.

## 2021-11-30 ENCOUNTER — HOSPITAL ENCOUNTER (OUTPATIENT)
Dept: MRI IMAGING | Facility: HOSPITAL | Age: 66
Discharge: HOME | End: 2021-11-30
Attending: ANESTHESIOLOGY
Payer: MEDICARE

## 2021-11-30 DIAGNOSIS — M54.16 LUMBAR RADICULOPATHY: ICD-10-CM

## 2021-11-30 PROCEDURE — 72148 MRI LUMBAR SPINE W/O DYE: CPT | Performed by: ANESTHESIOLOGY

## 2021-12-08 ENCOUNTER — TELEPHONE (OUTPATIENT)
Dept: PAIN CLINIC | Facility: CLINIC | Age: 66
End: 2021-12-08

## 2021-12-08 DIAGNOSIS — M54.16 LUMBAR RADICULOPATHY: Primary | ICD-10-CM

## 2021-12-08 NOTE — TELEPHONE ENCOUNTER
Contacted pt to relay info below and offered to schedule pt. Answered questions pt had pertaining to injection. Pt confirms that she can rcv sedation with this injection as she had w/ her hip joint injection.  Pt states she is at the store and will have to

## 2021-12-08 NOTE — TELEPHONE ENCOUNTER
Wilber Casanova MD  You 11 minutes ago (10:19 AM)     MRI shows:     Minimal disc bulges throughout the lumbar spine are noted.  There is minimal foraminal extension of disc bulge on the right at L5-S1 that may minimally abut the right L5 foraminal nerve

## 2021-12-08 NOTE — TELEPHONE ENCOUNTER
Patient advised of insurance approval to proceed with injections and is agreeable to scheduling. Patient scheduled for procedure, pre-procedure instructions reviewed. Patient prefers conscious sedation.  Reviewed sedation instructions including fasting of 8 [employer]. • Please bring your Insurance Card, Photo ID, List of Current Medications and Referral (if applicable) to your appointment. Check in at [facility] ([address] [facility] [address] [place] [address]., [place], [place]) outpatient registration in the [employer].   • Pl within 48 hours of the scheduled date, your procedure will be cancelled and rescheduled to a later date. Please contact your insurance carrier to determine what your financial  responsibility will be for the procedure(s).     Cancellation/Rescheduling Hal

## 2021-12-13 ENCOUNTER — LAB ENCOUNTER (OUTPATIENT)
Dept: LAB | Facility: HOSPITAL | Age: 66
End: 2021-12-13
Attending: ANESTHESIOLOGY
Payer: MEDICARE

## 2021-12-13 DIAGNOSIS — M54.16 LUMBAR RADICULOPATHY: ICD-10-CM

## 2021-12-16 ENCOUNTER — APPOINTMENT (OUTPATIENT)
Dept: GENERAL RADIOLOGY | Facility: HOSPITAL | Age: 66
End: 2021-12-16
Attending: ANESTHESIOLOGY
Payer: MEDICARE

## 2021-12-16 ENCOUNTER — HOSPITAL ENCOUNTER (OUTPATIENT)
Facility: HOSPITAL | Age: 66
Setting detail: HOSPITAL OUTPATIENT SURGERY
Discharge: HOME | End: 2021-12-16
Attending: ANESTHESIOLOGY | Admitting: ANESTHESIOLOGY
Payer: MEDICARE

## 2021-12-16 VITALS
DIASTOLIC BLOOD PRESSURE: 66 MMHG | WEIGHT: 159 LBS | BODY MASS INDEX: 26.17 KG/M2 | HEIGHT: 65.25 IN | RESPIRATION RATE: 16 BRPM | SYSTOLIC BLOOD PRESSURE: 146 MMHG | HEART RATE: 75 BPM | TEMPERATURE: 98 F | OXYGEN SATURATION: 95 %

## 2021-12-16 DIAGNOSIS — M54.16 LUMBAR RADICULOPATHY: ICD-10-CM

## 2021-12-16 PROCEDURE — 99152 MOD SED SAME PHYS/QHP 5/>YRS: CPT | Performed by: ANESTHESIOLOGY

## 2021-12-16 PROCEDURE — 3E0R33Z INTRODUCTION OF ANTI-INFLAMMATORY INTO SPINAL CANAL, PERCUTANEOUS APPROACH: ICD-10-PCS | Performed by: ANESTHESIOLOGY

## 2021-12-16 RX ORDER — ONDANSETRON 2 MG/ML
4 INJECTION INTRAMUSCULAR; INTRAVENOUS ONCE AS NEEDED
Status: DISCONTINUED | OUTPATIENT
Start: 2021-12-16 | End: 2021-12-16

## 2021-12-16 RX ORDER — SODIUM CHLORIDE, SODIUM LACTATE, POTASSIUM CHLORIDE, CALCIUM CHLORIDE 600; 310; 30; 20 MG/100ML; MG/100ML; MG/100ML; MG/100ML
100 INJECTION, SOLUTION INTRAVENOUS CONTINUOUS
Status: DISCONTINUED | OUTPATIENT
Start: 2021-12-16 | End: 2021-12-16

## 2021-12-16 RX ORDER — DIPHENHYDRAMINE HYDROCHLORIDE 50 MG/ML
50 INJECTION INTRAMUSCULAR; INTRAVENOUS ONCE AS NEEDED
Status: CANCELLED | OUTPATIENT
Start: 2021-12-16 | End: 2021-12-16

## 2021-12-16 RX ORDER — ONDANSETRON 2 MG/ML
4 INJECTION INTRAMUSCULAR; INTRAVENOUS ONCE AS NEEDED
Status: CANCELLED | OUTPATIENT
Start: 2021-12-16 | End: 2021-12-16

## 2021-12-16 RX ORDER — DEXAMETHASONE SODIUM PHOSPHATE 10 MG/ML
INJECTION, SOLUTION INTRAMUSCULAR; INTRAVENOUS
Status: DISCONTINUED | OUTPATIENT
Start: 2021-12-16 | End: 2021-12-16

## 2021-12-16 RX ORDER — MIDAZOLAM HYDROCHLORIDE 1 MG/ML
INJECTION INTRAMUSCULAR; INTRAVENOUS
Status: DISCONTINUED | OUTPATIENT
Start: 2021-12-16 | End: 2021-12-16

## 2021-12-16 RX ORDER — LIDOCAINE HYDROCHLORIDE 10 MG/ML
INJECTION, SOLUTION EPIDURAL; INFILTRATION; INTRACAUDAL; PERINEURAL
Status: DISCONTINUED | OUTPATIENT
Start: 2021-12-16 | End: 2021-12-16

## 2021-12-16 RX ORDER — SODIUM CHLORIDE 9 MG/ML
INJECTION INTRAVENOUS
Status: DISCONTINUED | OUTPATIENT
Start: 2021-12-16 | End: 2021-12-16

## 2021-12-16 NOTE — OPERATIVE REPORT
[facility]  Operative Report  2021     Mamadou Leigh Patient Status:  Hospital Outpatient Surgery    10/13/1955 MRN [id number]   Location [id number] [place] [address] Attending Shellee Habermann, MD   [address] [facility] Day # 0 JINNY Cr The needle was advanced into the anterior epidural space at this level. The needle position was confirmed under AP and lateral fluoroscopic view. Following negative aspiration for CSF and blood, approximately 1 cc of Omnipaque 240 was injected.   An excell

## 2021-12-16 NOTE — H&P
History & Physical Examination    Patient Name: Siddharth Kumar  MRN: [id number]  CSN: [id number]  YOB: 1955    Pre-Operative Diagnosis:  Lumbar radiculopathy [M54.16]    Present Illness: Patient with lumbar radiculopathy for transforaminal Comments)  Lisinopril              Coughing  Simvastatin                 Comment:Other reaction(s):  Altered Mental Status    Past Medical History:   Diagnosis Date   • Abdominal distention    • Abdominal pain    • Belching    • Bloating    • Chest pain History and Physical done within the last 30 days. Any changes noted above.     Anton Hinkle MD

## 2022-01-03 ENCOUNTER — HOSPITAL ENCOUNTER (OUTPATIENT)
Dept: MAMMOGRAPHY | Age: 67
Discharge: HOME | End: 2022-01-03
Attending: FAMILY MEDICINE
Payer: MEDICARE

## 2022-01-03 DIAGNOSIS — Z12.31 VISIT FOR SCREENING MAMMOGRAM: ICD-10-CM

## 2022-01-03 PROCEDURE — 77063 BREAST TOMOSYNTHESIS BI: CPT | Performed by: FAMILY MEDICINE

## 2022-01-03 PROCEDURE — 77067 SCR MAMMO BI INCL CAD: CPT | Performed by: FAMILY MEDICINE

## 2022-01-12 ENCOUNTER — HOSPITAL ENCOUNTER (OUTPATIENT)
Dept: MAMMOGRAPHY | Facility: HOSPITAL | Age: 67
Discharge: HOME | End: 2022-01-12
Attending: FAMILY MEDICINE
Payer: MEDICARE

## 2022-01-12 DIAGNOSIS — R92.2 INCONCLUSIVE MAMMOGRAM: ICD-10-CM

## 2022-01-12 PROCEDURE — 77066 DX MAMMO INCL CAD BI: CPT | Performed by: FAMILY MEDICINE

## 2022-01-12 PROCEDURE — 77062 BREAST TOMOSYNTHESIS BI: CPT | Performed by: FAMILY MEDICINE

## 2022-01-12 PROCEDURE — 76641 ULTRASOUND BREAST COMPLETE: CPT | Performed by: FAMILY MEDICINE

## 2022-01-19 ENCOUNTER — OFFICE VISIT (OUTPATIENT)
Dept: PAIN CLINIC | Facility: CLINIC | Age: 67
End: 2022-01-19
Payer: MEDICARE

## 2022-01-19 VITALS
OXYGEN SATURATION: 98 % | SYSTOLIC BLOOD PRESSURE: 120 MMHG | DIASTOLIC BLOOD PRESSURE: 60 MMHG | WEIGHT: 159 LBS | BODY MASS INDEX: 26 KG/M2 | HEART RATE: 78 BPM

## 2022-01-19 DIAGNOSIS — G89.29 HIP PAIN, CHRONIC, RIGHT: Primary | ICD-10-CM

## 2022-01-19 DIAGNOSIS — M25.551 HIP PAIN, CHRONIC, RIGHT: Primary | ICD-10-CM

## 2022-01-19 PROCEDURE — 99214 OFFICE O/P EST MOD 30 MIN: CPT | Performed by: ANESTHESIOLOGY

## 2022-01-19 NOTE — PROGRESS NOTES
Name: Elijah Darnell   : 10/13/1955   DOS: 2022     Pain Clinic Follow Up Visit:   Patient presents with:   Follow - Up: right hip pain       Elijah Darnell is a 77year old female with a history of right-sided hip pain and lumbar radiculopath Take 81 mg by mouth daily. • Cyanocobalamin (VITAMIN B12 OR) Take 2,500 mcg by mouth daily. • Cholecalciferol (VITAMIN D3) 50 MCG (2000 UT) Oral Tab Take 1 tablet by mouth daily.        • Calcium Carb-Cholecalciferol (CALCIUM + D3) 600-200 MG-UNIT

## 2022-01-19 NOTE — PROGRESS NOTES
Patient presents in office today with reported pain in right hip    Current pain level reported = 1/10    Last reported dose of na      Narcotic Contract renewal na    Urine Drug screen na

## 2022-03-14 ENCOUNTER — OFFICE VISIT (OUTPATIENT)
Dept: OBGYN CLINIC | Facility: CLINIC | Age: 67
End: 2022-03-14
Payer: MEDICARE

## 2022-03-14 VITALS
HEIGHT: 65 IN | DIASTOLIC BLOOD PRESSURE: 70 MMHG | WEIGHT: 166.5 LBS | SYSTOLIC BLOOD PRESSURE: 126 MMHG | HEART RATE: 82 BPM | BODY MASS INDEX: 27.74 KG/M2

## 2022-03-14 DIAGNOSIS — Z12.4 ENCOUNTER FOR SCREENING FOR CERVICAL CANCER: ICD-10-CM

## 2022-03-14 DIAGNOSIS — R87.810 ASCUS WITH POSITIVE HIGH RISK HPV CERVICAL: ICD-10-CM

## 2022-03-14 DIAGNOSIS — R87.810 CERVICAL HIGH RISK HUMAN PAPILLOMAVIRUS (HPV) DNA TEST POSITIVE: ICD-10-CM

## 2022-03-14 DIAGNOSIS — R87.610 ASCUS WITH POSITIVE HIGH RISK HPV CERVICAL: ICD-10-CM

## 2022-03-14 DIAGNOSIS — N88.2 CERVICAL STENOSIS (UTERINE CERVIX): ICD-10-CM

## 2022-03-14 DIAGNOSIS — Z01.419 WELL WOMAN EXAM WITH ROUTINE GYNECOLOGICAL EXAM: Primary | ICD-10-CM

## 2022-03-14 PROCEDURE — 88175 CYTOPATH C/V AUTO FLUID REDO: CPT | Performed by: OBSTETRICS & GYNECOLOGY

## 2022-03-14 PROCEDURE — 88342 IMHCHEM/IMCYTCHM 1ST ANTB: CPT | Performed by: OBSTETRICS & GYNECOLOGY

## 2022-03-14 PROCEDURE — G0101 CA SCREEN;PELVIC/BREAST EXAM: HCPCS | Performed by: OBSTETRICS & GYNECOLOGY

## 2022-03-14 PROCEDURE — 88305 TISSUE EXAM BY PATHOLOGIST: CPT | Performed by: OBSTETRICS & GYNECOLOGY

## 2022-03-25 NOTE — PROGRESS NOTES
Contacted patient. Reviewed endocervical results. Low-grade squamous intraepithelial lesion noted on endocervical sampling. Pap smear pending. 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 Recommend repeat colposcopy. Patient agreeable plan. Patient to call on Monday for appointment.

## 2022-04-14 RX ORDER — DILTIAZEM HYDROCHLORIDE 180 MG/1
CAPSULE, COATED, EXTENDED RELEASE ORAL
Qty: 180 CAPSULE | Refills: 1 | Status: SHIPPED | OUTPATIENT
Start: 2022-04-14

## 2022-04-25 ENCOUNTER — OFFICE VISIT (OUTPATIENT)
Dept: FAMILY MEDICINE CLINIC | Facility: CLINIC | Age: 67
End: 2022-04-25
Payer: MEDICARE

## 2022-04-25 VITALS
HEIGHT: 65 IN | RESPIRATION RATE: 16 BRPM | TEMPERATURE: 98 F | DIASTOLIC BLOOD PRESSURE: 70 MMHG | BODY MASS INDEX: 27.32 KG/M2 | SYSTOLIC BLOOD PRESSURE: 124 MMHG | WEIGHT: 164 LBS | HEART RATE: 78 BPM

## 2022-04-25 DIAGNOSIS — E78.00 PURE HYPERCHOLESTEROLEMIA: ICD-10-CM

## 2022-04-25 DIAGNOSIS — I10 ESSENTIAL HYPERTENSION: ICD-10-CM

## 2022-04-25 DIAGNOSIS — Z23 NEED FOR 23-POLYVALENT PNEUMOCOCCAL POLYSACCHARIDE VACCINE: ICD-10-CM

## 2022-04-25 DIAGNOSIS — R91.8 MULTIPLE LUNG NODULES ON CT: ICD-10-CM

## 2022-04-25 DIAGNOSIS — I47.1 PSVT (PAROXYSMAL SUPRAVENTRICULAR TACHYCARDIA) (HCC): ICD-10-CM

## 2022-04-25 DIAGNOSIS — Z87.891 FORMER SMOKER: ICD-10-CM

## 2022-04-25 DIAGNOSIS — J42 CHRONIC BRONCHITIS, UNSPECIFIED CHRONIC BRONCHITIS TYPE (HCC): ICD-10-CM

## 2022-04-25 DIAGNOSIS — I10 HTN (HYPERTENSION), BENIGN: ICD-10-CM

## 2022-04-25 DIAGNOSIS — N95.1 MENOPAUSAL AND FEMALE CLIMACTERIC STATES: ICD-10-CM

## 2022-04-25 DIAGNOSIS — R05.9 COUGH: Primary | ICD-10-CM

## 2022-04-25 DIAGNOSIS — I25.10 CORONARY ARTERY DISEASE INVOLVING NATIVE CORONARY ARTERY OF NATIVE HEART WITHOUT ANGINA PECTORIS: ICD-10-CM

## 2022-04-25 PROBLEM — J44.9 CHRONIC OBSTRUCTIVE PULMONARY DISEASE, UNSPECIFIED (HCC): Status: ACTIVE | Noted: 2022-04-25

## 2022-04-25 PROCEDURE — G0009 ADMIN PNEUMOCOCCAL VACCINE: HCPCS | Performed by: FAMILY MEDICINE

## 2022-04-25 PROCEDURE — 90677 PCV20 VACCINE IM: CPT | Performed by: FAMILY MEDICINE

## 2022-04-25 PROCEDURE — 99214 OFFICE O/P EST MOD 30 MIN: CPT | Performed by: FAMILY MEDICINE

## 2022-04-25 RX ORDER — FLUTICASONE PROPIONATE AND SALMETEROL 250; 50 UG/1; UG/1
1 POWDER RESPIRATORY (INHALATION) EVERY 12 HOURS SCHEDULED
Qty: 60 EACH | Refills: 1 | Status: SHIPPED | OUTPATIENT
Start: 2022-04-25

## 2022-04-27 RX ORDER — LOSARTAN POTASSIUM 100 MG/1
TABLET ORAL
Qty: 90 TABLET | Refills: 0 | Status: SHIPPED | OUTPATIENT
Start: 2022-04-27

## 2022-05-03 ENCOUNTER — OFFICE VISIT (OUTPATIENT)
Dept: OBGYN CLINIC | Facility: CLINIC | Age: 67
End: 2022-05-03
Payer: MEDICARE

## 2022-05-03 VITALS
DIASTOLIC BLOOD PRESSURE: 76 MMHG | HEIGHT: 69 IN | HEART RATE: 76 BPM | BODY MASS INDEX: 24.44 KG/M2 | SYSTOLIC BLOOD PRESSURE: 144 MMHG | WEIGHT: 165 LBS

## 2022-05-03 DIAGNOSIS — R87.810 ASCUS WITH POSITIVE HIGH RISK HPV CERVICAL: ICD-10-CM

## 2022-05-03 DIAGNOSIS — R87.612 LGSIL OF CERVIX OF UNDETERMINED SIGNIFICANCE: Primary | ICD-10-CM

## 2022-05-03 DIAGNOSIS — R87.610 ASCUS WITH POSITIVE HIGH RISK HPV CERVICAL: ICD-10-CM

## 2022-05-03 PROCEDURE — 88342 IMHCHEM/IMCYTCHM 1ST ANTB: CPT | Performed by: OBSTETRICS & GYNECOLOGY

## 2022-05-03 PROCEDURE — 88305 TISSUE EXAM BY PATHOLOGIST: CPT | Performed by: OBSTETRICS & GYNECOLOGY

## 2022-05-03 PROCEDURE — 57454 BX/CURETT OF CERVIX W/SCOPE: CPT | Performed by: OBSTETRICS & GYNECOLOGY

## 2022-05-03 RX ORDER — FLUTICASONE PROPIONATE AND SALMETEROL 250; 50 UG/1; UG/1
POWDER RESPIRATORY (INHALATION)
COMMUNITY
Start: 2022-04-25

## 2022-05-05 NOTE — PATIENT INSTRUCTIONS
[facility] Department of OB/GYN  After Care Instructions for Colposcopy/Biopsy      Biopsy Results   You will receive a phone call with your biopsy results in 7 business days. If you have not received your biopsy results in 7 days, please contact our office. Your biopsy results will help the physician decide if and what further treatment is  necessary. Bleeding   After your biopsy, the area is swabbed with a brown solution to help stop any bleeding. For this reason, you may notice a brown or black clotty discharge lasting several days. If you experience bright red bleeding that is heavy, you should call the office. Restrictions    You should avoid douching, intercourse and tampon use for 3 days following your procedure. Pain    If you are uncomfortable after the procedure, you may use Aleve, Tylenol or Ibuprofen for the discomfort. If you have additional questions or concerns, please call us at [phone].

## 2022-05-05 NOTE — PROCEDURES
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 Pre-procedure diagnosis:   LGSIL  Hx of ASCUS, HPV positive     Post-procedure diagnosis:  same    Procedure:  Colposcopy   Cervical Biopsy   ECC     Procedure Details:   A discussion was held with patient including diagnosis, prognosis and management. Recommendation made for colposcopy with possible biopsies. Risks, benefits and alteratives were discussed with the patient. The patient was agreed to proceed with procedure. She provided written and verbal consent. The patient was positioned supine and in stir-ups. The sterile speculum was placed in the vagina and the cervix was visualized. The vagina appeared normal with no lesions or discolorations noted. The cervix was then swabbed with sterile saline and no lesions seen under gross examination. Green light filter was negative. After application of acetic acid, no white feathery acetowhite changes were noted. Please refer to image provided below. No mocaism, no punctations seen on gross examination. This findings were consistent after the application of Lugol's solution. A biopsy was then collected at each indicated lesion site. The transformation zone was not fully visualized. No lesions noted. unsatisfactory Colposcopy. Therefore, ECC was collected. Biopsy sites were examined. Silver Nitrate and Monsel's solution was applied to the cervix. Good hemostasis noted. All instruments were removed from the vagina. All tissue were placed into the designated specimen containers and collected to be sent to pathology. Impression: BITA 1 pending final pathology     Disposition: Stable. RTC in 1 year for well woman exam or sooner if needed.       Enrique Love MD   [facility] - OBGYN          Note to patient and family   The [facility] makes medical notes available to patients in the interest of transparency. However, please be advised that this is a medical document. It is intended as meaw-pn-uhza communication. It is written and medical language may contain abbreviations or verbiage that are technical and unfamiliar. It may appear blunt or direct. Medical documents are intended to carry relevant information, facts as evident, and the clinical opinion of the practitioner. This note could include assistance by [employer] recognition.  Errors in content may be related to improper recognition by the system; efforts to review and correct have been done but errors may still exist.

## 2022-05-06 ENCOUNTER — HOSPITAL ENCOUNTER (OUTPATIENT)
Dept: CT IMAGING | Facility: HOSPITAL | Age: 67
Discharge: HOME | End: 2022-05-06
Attending: FAMILY MEDICINE
Payer: MEDICARE

## 2022-05-06 DIAGNOSIS — R05.9 COUGH: ICD-10-CM

## 2022-05-06 DIAGNOSIS — Z87.891 FORMER SMOKER: ICD-10-CM

## 2022-05-06 DIAGNOSIS — R91.8 MULTIPLE LUNG NODULES ON CT: ICD-10-CM

## 2022-05-06 PROCEDURE — 71250 CT THORAX DX C-: CPT | Performed by: FAMILY MEDICINE

## 2022-05-11 ENCOUNTER — OFFICE VISIT (OUTPATIENT)
Dept: FAMILY MEDICINE CLINIC | Facility: CLINIC | Age: 67
End: 2022-05-11
Payer: MEDICARE

## 2022-05-11 VITALS
HEIGHT: 67.7 IN | DIASTOLIC BLOOD PRESSURE: 74 MMHG | HEART RATE: 74 BPM | RESPIRATION RATE: 16 BRPM | TEMPERATURE: 97 F | SYSTOLIC BLOOD PRESSURE: 112 MMHG | BODY MASS INDEX: 24.69 KG/M2 | WEIGHT: 161 LBS

## 2022-05-11 DIAGNOSIS — N30.01 ACUTE CYSTITIS WITH HEMATURIA: ICD-10-CM

## 2022-05-11 DIAGNOSIS — R30.0 DYSURIA: Primary | ICD-10-CM

## 2022-05-11 LAB
BILIRUBIN: NEGATIVE
GLUCOSE (URINE DIPSTICK): NEGATIVE MG/DL
KETONES (URINE DIPSTICK): NEGATIVE MG/DL
MULTISTIX LOT#: ABNORMAL NUMERIC
NITRITE, URINE: NEGATIVE
PH, URINE: 7 (ref 4.5–8)
PROTEIN (URINE DIPSTICK): NEGATIVE MG/DL
SPECIFIC GRAVITY: 1.01 (ref 1–1.03)
URINE-COLOR: YELLOW
UROBILINOGEN,SEMI-QN: 0.2 MG/DL (ref 0–1.9)

## 2022-05-11 PROCEDURE — 87086 URINE CULTURE/COLONY COUNT: CPT | Performed by: PHYSICIAN ASSISTANT

## 2022-05-11 PROCEDURE — 81003 URINALYSIS AUTO W/O SCOPE: CPT | Performed by: PHYSICIAN ASSISTANT

## 2022-05-11 PROCEDURE — 99213 OFFICE O/P EST LOW 20 MIN: CPT | Performed by: PHYSICIAN ASSISTANT

## 2022-05-11 RX ORDER — NITROFURANTOIN 25; 75 MG/1; MG/1
100 CAPSULE ORAL 2 TIMES DAILY
Qty: 10 CAPSULE | Refills: 0 | Status: SHIPPED | OUTPATIENT
Start: 2022-05-11 | End: 2022-05-16

## 2022-05-16 ENCOUNTER — TELEPHONE (OUTPATIENT)
Dept: OBGYN CLINIC | Facility: CLINIC | Age: 67
End: 2022-05-16

## 2022-05-16 DIAGNOSIS — N87.1 CERVICAL INTRAEPITHELIAL NEOPLASIA II: Primary | ICD-10-CM

## 2022-05-18 NOTE — TELEPHONE ENCOUNTER
Surgery scheduled for 6/9/22 at 2:30pm  Post op   Future Appointments   Date Time Provider Prema Estrella   6/22/2022 10:15 AM Tono Person MD [facility] [place]     Orders entered  Added to calendar  PA - medicare - no auth needed  [facility] follows medicare

## 2022-06-03 ENCOUNTER — TELEPHONE (OUTPATIENT)
Dept: OBGYN CLINIC | Facility: CLINIC | Age: 67
End: 2022-06-03

## 2022-06-03 NOTE — TELEPHONE ENCOUNTER
Patient is scheduled for LEEP on 6/9/22    Contacted patient. She is taking a baby ASA daily. Isn't sure why. Her cardiologist has all of his patients on it. Advised to discontinue now. Patient states understanding.

## 2022-06-03 NOTE — TELEPHONE ENCOUNTER
Patient is having surgery with dr. Nevaeh Duran and would like to know if she should stop taking Asprin before surgery or if she can keep taking it

## 2022-06-06 ENCOUNTER — EKG ENCOUNTER (OUTPATIENT)
Dept: LAB | Facility: HOSPITAL | Age: 67
End: 2022-06-06
Attending: OBSTETRICS & GYNECOLOGY
Payer: MEDICARE

## 2022-06-06 DIAGNOSIS — N87.1 CIN II (CERVICAL INTRAEPITHELIAL NEOPLASIA II): ICD-10-CM

## 2022-06-06 LAB
ANION GAP SERPL CALC-SCNC: 6 MMOL/L (ref 0–18)
ATRIAL RATE: 75 BPM
BUN BLD-MCNC: 16 MG/DL (ref 7–18)
CALCIUM BLD-MCNC: 9.3 MG/DL (ref 8.5–10.1)
CHLORIDE SERPL-SCNC: 108 MMOL/L (ref 98–112)
CO2 SERPL-SCNC: 26 MMOL/L (ref 21–32)
CREAT BLD-MCNC: 0.96 MG/DL
FASTING STATUS PATIENT QL REPORTED: YES
GLUCOSE BLD-MCNC: 106 MG/DL (ref 70–99)
OSMOLALITY SERPL CALC.SUM OF ELEC: 292 MOSM/KG (ref 275–295)
P AXIS: 58 DEGREES
P-R INTERVAL: 154 MS
POTASSIUM SERPL-SCNC: 4.2 MMOL/L (ref 3.5–5.1)
Q-T INTERVAL: 410 MS
QRS DURATION: 90 MS
QTC CALCULATION (BEZET): 457 MS
R AXIS: 44 DEGREES
SARS-COV-2 RNA RESP QL NAA+PROBE: NOT DETECTED
SODIUM SERPL-SCNC: 140 MMOL/L (ref 136–145)
T AXIS: 75 DEGREES
VENTRICULAR RATE: 75 BPM

## 2022-06-06 PROCEDURE — 93005 ELECTROCARDIOGRAM TRACING: CPT

## 2022-06-06 PROCEDURE — 80048 BASIC METABOLIC PNL TOTAL CA: CPT

## 2022-06-06 PROCEDURE — 36415 COLL VENOUS BLD VENIPUNCTURE: CPT

## 2022-06-06 PROCEDURE — 93010 ELECTROCARDIOGRAM REPORT: CPT | Performed by: INTERNAL MEDICINE

## 2022-06-16 ENCOUNTER — LAB ENCOUNTER (OUTPATIENT)
Dept: LAB | Facility: HOSPITAL | Age: 67
End: 2022-06-16
Attending: OBSTETRICS & GYNECOLOGY
Payer: MEDICARE

## 2022-06-16 DIAGNOSIS — Z01.812 ENCOUNTER FOR PREOPERATIVE SCREENING LABORATORY TESTING FOR COVID-19 VIRUS: ICD-10-CM

## 2022-06-16 DIAGNOSIS — Z20.822 ENCOUNTER FOR PREOPERATIVE SCREENING LABORATORY TESTING FOR COVID-19 VIRUS: ICD-10-CM

## 2022-06-17 LAB — SARS-COV-2 RNA RESP QL NAA+PROBE: NOT DETECTED

## 2022-06-18 ENCOUNTER — ANESTHESIA (OUTPATIENT)
Dept: SURGERY | Facility: HOSPITAL | Age: 67
End: 2022-06-18
Payer: MEDICARE

## 2022-06-18 ENCOUNTER — ANESTHESIA EVENT (OUTPATIENT)
Dept: SURGERY | Facility: HOSPITAL | Age: 67
End: 2022-06-18
Payer: MEDICARE

## 2022-06-18 ENCOUNTER — HOSPITAL ENCOUNTER (OUTPATIENT)
Facility: HOSPITAL | Age: 67
Setting detail: HOSPITAL OUTPATIENT SURGERY
Discharge: HOME | End: 2022-06-18
Attending: OBSTETRICS & GYNECOLOGY | Admitting: OBSTETRICS & GYNECOLOGY
Payer: MEDICARE

## 2022-06-18 VITALS
HEART RATE: 77 BPM | BODY MASS INDEX: 23.94 KG/M2 | HEIGHT: 69 IN | TEMPERATURE: 98 F | RESPIRATION RATE: 22 BRPM | WEIGHT: 161.63 LBS | SYSTOLIC BLOOD PRESSURE: 147 MMHG | DIASTOLIC BLOOD PRESSURE: 76 MMHG | OXYGEN SATURATION: 99 %

## 2022-06-18 DIAGNOSIS — Z20.822 ENCOUNTER FOR PREOPERATIVE SCREENING LABORATORY TESTING FOR COVID-19 VIRUS: ICD-10-CM

## 2022-06-18 DIAGNOSIS — N87.1 CIN II (CERVICAL INTRAEPITHELIAL NEOPLASIA II): Primary | ICD-10-CM

## 2022-06-18 DIAGNOSIS — Z01.812 ENCOUNTER FOR PREOPERATIVE SCREENING LABORATORY TESTING FOR COVID-19 VIRUS: ICD-10-CM

## 2022-06-18 DIAGNOSIS — N87.1 CERVICAL INTRAEPITHELIAL NEOPLASIA II: ICD-10-CM

## 2022-06-18 PROCEDURE — 57522 CONIZATION OF CERVIX: CPT | Performed by: OBSTETRICS & GYNECOLOGY

## 2022-06-18 PROCEDURE — 0UBC7ZX EXCISION OF CERVIX, VIA NATURAL OR ARTIFICIAL OPENING, DIAGNOSTIC: ICD-10-PCS | Performed by: OBSTETRICS & GYNECOLOGY

## 2022-06-18 RX ORDER — KETOROLAC TROMETHAMINE 30 MG/ML
INJECTION, SOLUTION INTRAMUSCULAR; INTRAVENOUS AS NEEDED
Status: DISCONTINUED | OUTPATIENT
Start: 2022-06-18 | End: 2022-06-18 | Stop reason: SURG

## 2022-06-18 RX ORDER — ONDANSETRON 2 MG/ML
INJECTION INTRAMUSCULAR; INTRAVENOUS AS NEEDED
Status: DISCONTINUED | OUTPATIENT
Start: 2022-06-18 | End: 2022-06-18 | Stop reason: SURG

## 2022-06-18 RX ORDER — SODIUM CHLORIDE, SODIUM LACTATE, POTASSIUM CHLORIDE, CALCIUM CHLORIDE 600; 310; 30; 20 MG/100ML; MG/100ML; MG/100ML; MG/100ML
INJECTION, SOLUTION INTRAVENOUS CONTINUOUS
Status: DISCONTINUED | OUTPATIENT
Start: 2022-06-18 | End: 2022-06-18

## 2022-06-18 RX ORDER — ONDANSETRON 2 MG/ML
4 INJECTION INTRAMUSCULAR; INTRAVENOUS EVERY 6 HOURS PRN
Status: DISCONTINUED | OUTPATIENT
Start: 2022-06-18 | End: 2022-06-18

## 2022-06-18 RX ORDER — IODINE SOLUTION STRONG 5% (LUGOL'S) 5 %
SOLUTION ORAL AS NEEDED
Status: DISCONTINUED | OUTPATIENT
Start: 2022-06-18 | End: 2022-06-18 | Stop reason: HOSPADM

## 2022-06-18 RX ORDER — HYDROMORPHONE HYDROCHLORIDE 1 MG/ML
0.4 INJECTION, SOLUTION INTRAMUSCULAR; INTRAVENOUS; SUBCUTANEOUS EVERY 5 MIN PRN
Status: DISCONTINUED | OUTPATIENT
Start: 2022-06-18 | End: 2022-06-18

## 2022-06-18 RX ORDER — ACETAMINOPHEN 500 MG
500 TABLET ORAL ONCE AS NEEDED
Status: COMPLETED | OUTPATIENT
Start: 2022-06-18 | End: 2022-06-18

## 2022-06-18 RX ORDER — HYDROMORPHONE HYDROCHLORIDE 1 MG/ML
0.6 INJECTION, SOLUTION INTRAMUSCULAR; INTRAVENOUS; SUBCUTANEOUS EVERY 5 MIN PRN
Status: DISCONTINUED | OUTPATIENT
Start: 2022-06-18 | End: 2022-06-18

## 2022-06-18 RX ORDER — METOCLOPRAMIDE HYDROCHLORIDE 5 MG/ML
10 INJECTION INTRAMUSCULAR; INTRAVENOUS EVERY 8 HOURS PRN
Status: DISCONTINUED | OUTPATIENT
Start: 2022-06-18 | End: 2022-06-18

## 2022-06-18 RX ORDER — ACETAMINOPHEN 500 MG
1000 TABLET ORAL ONCE
Status: DISCONTINUED | OUTPATIENT
Start: 2022-06-18 | End: 2022-06-18 | Stop reason: HOSPADM

## 2022-06-18 RX ORDER — HYDROMORPHONE HYDROCHLORIDE 1 MG/ML
0.2 INJECTION, SOLUTION INTRAMUSCULAR; INTRAVENOUS; SUBCUTANEOUS EVERY 5 MIN PRN
Status: DISCONTINUED | OUTPATIENT
Start: 2022-06-18 | End: 2022-06-18

## 2022-06-18 RX ORDER — NALOXONE HYDROCHLORIDE 0.4 MG/ML
80 INJECTION, SOLUTION INTRAMUSCULAR; INTRAVENOUS; SUBCUTANEOUS AS NEEDED
Status: DISCONTINUED | OUTPATIENT
Start: 2022-06-18 | End: 2022-06-18

## 2022-06-18 RX ORDER — LIDOCAINE HYDROCHLORIDE 10 MG/ML
INJECTION, SOLUTION EPIDURAL; INFILTRATION; INTRACAUDAL; PERINEURAL AS NEEDED
Status: DISCONTINUED | OUTPATIENT
Start: 2022-06-18 | End: 2022-06-18 | Stop reason: SURG

## 2022-06-18 RX ORDER — FERRIC SUBSULFATE 20-22G/100
SOLUTION, NON-ORAL MISCELLANEOUS AS NEEDED
Status: DISCONTINUED | OUTPATIENT
Start: 2022-06-18 | End: 2022-06-18 | Stop reason: HOSPADM

## 2022-06-18 RX ORDER — ACETAMINOPHEN 500 MG
TABLET ORAL
Status: COMPLETED
Start: 2022-06-18 | End: 2022-06-18

## 2022-06-18 RX ADMIN — KETOROLAC TROMETHAMINE 30 MG: 30 INJECTION, SOLUTION INTRAMUSCULAR; INTRAVENOUS at 14:11:00

## 2022-06-18 RX ADMIN — ONDANSETRON 4 MG: 2 INJECTION INTRAMUSCULAR; INTRAVENOUS at 14:11:00

## 2022-06-18 RX ADMIN — LIDOCAINE HYDROCHLORIDE 50 MG: 10 INJECTION, SOLUTION EPIDURAL; INFILTRATION; INTRACAUDAL; PERINEURAL at 13:41:00

## 2022-06-18 NOTE — PROGRESS NOTES
The above referenced H&P was reviewed by Shashank Hawkins MD on 6/18/2022, the patient was examined and no significant changes have occurred in the patient's condition since the H&P was performed. Risks and benefits were discussed, proceed with procedure as planned.

## 2022-06-23 NOTE — PROGRESS NOTES
Pathology report reviewed. Pathology noted for BITA-1 anterior cervical biopsy, with focus with surrounding normal cells. No dysplasia noted on the posterior or mid cervical biopsy. No dysplasia noted on endocervical curettage. Patient informed of results. Recommend repeat Pap smear in 1 year. Patient reports doing well following surgery. She denies fever, chills, chest pain shortness of breath. She denies vaginal bleeding or issues with bowel movements or urinating. Continue follow-up as scheduled. All question concerns were addressed. Discussed with patient possible HPV vaccination series.

## 2022-06-27 ENCOUNTER — OFFICE VISIT (OUTPATIENT)
Dept: OBGYN CLINIC | Facility: CLINIC | Age: 67
End: 2022-06-27
Payer: MEDICARE

## 2022-06-27 VITALS
DIASTOLIC BLOOD PRESSURE: 64 MMHG | HEIGHT: 69 IN | WEIGHT: 161.38 LBS | SYSTOLIC BLOOD PRESSURE: 130 MMHG | HEART RATE: 73 BPM | BODY MASS INDEX: 23.9 KG/M2

## 2022-06-27 DIAGNOSIS — Z23 NEED FOR HPV VACCINE: ICD-10-CM

## 2022-06-27 DIAGNOSIS — Z98.890 S/P LEEP: ICD-10-CM

## 2022-06-27 DIAGNOSIS — N87.9: Primary | ICD-10-CM

## 2022-07-11 NOTE — PROGRESS NOTES
Please inform patient of BITA I noted on colposcopy with negative ECC  Advised to repeat pap smear with co-testing in 1 year   Forwarded to abnormal pap smear pool none

## 2022-08-29 ENCOUNTER — NURSE ONLY (OUTPATIENT)
Dept: OBGYN CLINIC | Facility: CLINIC | Age: 67
End: 2022-08-29
Payer: MEDICARE

## 2022-08-29 VITALS
DIASTOLIC BLOOD PRESSURE: 64 MMHG | SYSTOLIC BLOOD PRESSURE: 138 MMHG | WEIGHT: 164.38 LBS | BODY MASS INDEX: 24.35 KG/M2 | HEIGHT: 69 IN

## 2022-08-29 DIAGNOSIS — Z23 NEED FOR HPV VACCINE: ICD-10-CM

## 2022-08-29 DIAGNOSIS — N87.9: ICD-10-CM

## 2022-08-29 PROCEDURE — 90471 IMMUNIZATION ADMIN: CPT | Performed by: OBSTETRICS & GYNECOLOGY

## 2022-08-29 PROCEDURE — 90651 9VHPV VACCINE 2/3 DOSE IM: CPT | Performed by: OBSTETRICS & GYNECOLOGY

## 2022-08-29 NOTE — PROGRESS NOTES
Patient, Isrraelroberto Doran, present for Gardasil #2 injection. Standing order was release. Advised patient that after vaccine she will be placed in the waiting room for 15 minutes to be sure she is in ok condition to leave the office. Informed patient of any adverse side effects were to happen on the way out, head to ER or RTC depending on severity. Patient understood. Injection was given in right deltoid muscle.

## 2022-09-30 ENCOUNTER — TELEPHONE (OUTPATIENT)
Dept: FAMILY MEDICINE CLINIC | Facility: CLINIC | Age: 67
End: 2022-09-30

## 2022-09-30 DIAGNOSIS — Z00.00 ROUTINE HEALTH MAINTENANCE: Primary | ICD-10-CM

## 2022-09-30 NOTE — TELEPHONE ENCOUNTER
Please enter lab orders for the patient's upcoming physical appointment. Physical scheduled: Your appointments     Date & Time Appointment Department [facility])    Nov 10, 2022  2:20 PM CST Medicare Annual Well Visit with DO Victor Manuel Davila [facility], [address], [place]  [facility]            Kimberly Card [facility] [address] [address] [address] [phone]         [facility] CANCER CTR & RESEARCH INST)     The patient has been notified to complete fasting labs prior to their physical appointment.

## 2022-11-10 ENCOUNTER — OFFICE VISIT (OUTPATIENT)
Dept: FAMILY MEDICINE CLINIC | Facility: CLINIC | Age: 67
End: 2022-11-10
Payer: MEDICARE

## 2022-11-10 VITALS
HEART RATE: 84 BPM | SYSTOLIC BLOOD PRESSURE: 120 MMHG | BODY MASS INDEX: 24.14 KG/M2 | TEMPERATURE: 97 F | HEIGHT: 69 IN | WEIGHT: 163 LBS | DIASTOLIC BLOOD PRESSURE: 70 MMHG

## 2022-11-10 DIAGNOSIS — M46.1 SACROILIITIS (HCC): ICD-10-CM

## 2022-11-10 DIAGNOSIS — I47.1 PAROXYSMAL SVT (SUPRAVENTRICULAR TACHYCARDIA) (HCC): ICD-10-CM

## 2022-11-10 DIAGNOSIS — Z78.0 POSTMENOPAUSAL: ICD-10-CM

## 2022-11-10 DIAGNOSIS — I10 ESSENTIAL HYPERTENSION: ICD-10-CM

## 2022-11-10 DIAGNOSIS — I25.10 CORONARY ARTERY DISEASE INVOLVING NATIVE CORONARY ARTERY OF NATIVE HEART WITHOUT ANGINA PECTORIS: ICD-10-CM

## 2022-11-10 DIAGNOSIS — Z00.00 ENCOUNTER FOR ANNUAL HEALTH EXAMINATION: Primary | ICD-10-CM

## 2022-11-10 DIAGNOSIS — E78.00 PURE HYPERCHOLESTEROLEMIA: ICD-10-CM

## 2022-11-10 DIAGNOSIS — N95.1 MENOPAUSAL AND FEMALE CLIMACTERIC STATES: ICD-10-CM

## 2022-11-10 DIAGNOSIS — M85.89 OSTEOPENIA OF MULTIPLE SITES: ICD-10-CM

## 2022-11-10 DIAGNOSIS — Z12.31 VISIT FOR SCREENING MAMMOGRAM: ICD-10-CM

## 2022-11-10 PROCEDURE — 1126F AMNT PAIN NOTED NONE PRSNT: CPT | Performed by: FAMILY MEDICINE

## 2022-11-10 PROCEDURE — G0439 PPPS, SUBSEQ VISIT: HCPCS | Performed by: FAMILY MEDICINE

## 2022-11-10 RX ORDER — ROSUVASTATIN CALCIUM 5 MG/1
5 TABLET, COATED ORAL NIGHTLY
Qty: 90 TABLET | Refills: 3 | Status: SHIPPED | OUTPATIENT
Start: 2022-11-10

## 2022-11-10 RX ORDER — LOSARTAN POTASSIUM 100 MG/1
100 TABLET ORAL DAILY
Qty: 90 TABLET | Refills: 1 | Status: SHIPPED | OUTPATIENT
Start: 2022-11-10

## 2022-11-10 RX ORDER — CITALOPRAM 20 MG/1
20 TABLET ORAL DAILY
Qty: 90 TABLET | Refills: 3 | Status: SHIPPED | OUTPATIENT
Start: 2022-11-10

## 2022-11-10 RX ORDER — DILTIAZEM HYDROCHLORIDE 180 MG/1
180 CAPSULE, COATED, EXTENDED RELEASE ORAL 2 TIMES DAILY
Qty: 180 CAPSULE | Refills: 1 | Status: SHIPPED | OUTPATIENT
Start: 2022-11-10

## 2022-11-11 ENCOUNTER — LAB ENCOUNTER (OUTPATIENT)
Dept: LAB | Facility: HOSPITAL | Age: 67
End: 2022-11-11
Attending: FAMILY MEDICINE
Payer: MEDICARE

## 2022-11-11 DIAGNOSIS — Z00.00 ROUTINE HEALTH MAINTENANCE: ICD-10-CM

## 2022-11-11 LAB
CHOLEST SERPL-MCNC: 186 MG/DL (ref ?–200)
FASTING PATIENT LIPID ANSWER: YES
HDLC SERPL-MCNC: 76 MG/DL (ref 40–59)
LDLC SERPL CALC-MCNC: 97 MG/DL (ref ?–100)
NONHDLC SERPL-MCNC: 110 MG/DL (ref ?–130)
TRIGL SERPL-MCNC: 71 MG/DL (ref 30–149)
TSI SER-ACNC: 2.19 MIU/ML (ref 0.36–3.74)
VLDLC SERPL CALC-MCNC: 12 MG/DL (ref 0–30)

## 2022-11-11 PROCEDURE — 84443 ASSAY THYROID STIM HORMONE: CPT

## 2022-11-11 PROCEDURE — 80061 LIPID PANEL: CPT

## 2022-11-11 PROCEDURE — 36415 COLL VENOUS BLD VENIPUNCTURE: CPT

## 2022-12-30 ENCOUNTER — NURSE ONLY (OUTPATIENT)
Dept: OBGYN CLINIC | Facility: CLINIC | Age: 67
End: 2022-12-30
Payer: MEDICARE

## 2022-12-30 VITALS
BODY MASS INDEX: 24.2 KG/M2 | SYSTOLIC BLOOD PRESSURE: 120 MMHG | DIASTOLIC BLOOD PRESSURE: 60 MMHG | WEIGHT: 163.38 LBS | HEIGHT: 69 IN

## 2022-12-30 DIAGNOSIS — Z23 NEED FOR HPV VACCINE: ICD-10-CM

## 2022-12-30 DIAGNOSIS — N87.9: ICD-10-CM

## 2022-12-30 PROCEDURE — 90651 9VHPV VACCINE 2/3 DOSE IM: CPT | Performed by: OBSTETRICS & GYNECOLOGY

## 2022-12-30 PROCEDURE — 90471 IMMUNIZATION ADMIN: CPT | Performed by: OBSTETRICS & GYNECOLOGY

## 2022-12-30 NOTE — PROGRESS NOTES
Patient, Ty Rodrigez 78 y/o F, presents to clinic for 3rd Gardasil dose. Vaccine was given in Left deltoid muscle. Patient was advised to go back to lobby and wait 15 minutes to make sure she is feeling ok to leave. Patient understood.

## 2023-01-17 ENCOUNTER — HOSPITAL ENCOUNTER (OUTPATIENT)
Dept: BONE DENSITY | Age: 68
Discharge: HOME | End: 2023-01-17
Attending: FAMILY MEDICINE
Payer: MEDICARE

## 2023-01-17 DIAGNOSIS — M85.89 OSTEOPENIA OF MULTIPLE SITES: ICD-10-CM

## 2023-01-17 DIAGNOSIS — Z78.0 POSTMENOPAUSAL: ICD-10-CM

## 2023-01-17 PROCEDURE — 77080 DXA BONE DENSITY AXIAL: CPT | Performed by: FAMILY MEDICINE

## 2023-01-27 ENCOUNTER — HOSPITAL ENCOUNTER (OUTPATIENT)
Dept: MAMMOGRAPHY | Facility: HOSPITAL | Age: 68
Discharge: HOME | End: 2023-01-27
Attending: FAMILY MEDICINE
Payer: MEDICARE

## 2023-01-27 DIAGNOSIS — Z12.31 VISIT FOR SCREENING MAMMOGRAM: ICD-10-CM

## 2023-01-27 PROCEDURE — 77067 SCR MAMMO BI INCL CAD: CPT | Performed by: FAMILY MEDICINE

## 2023-01-27 PROCEDURE — 77063 BREAST TOMOSYNTHESIS BI: CPT | Performed by: FAMILY MEDICINE

## 2023-03-07 ENCOUNTER — TELEPHONE (OUTPATIENT)
Dept: FAMILY MEDICINE CLINIC | Facility: CLINIC | Age: 68
End: 2023-03-07

## 2023-03-07 ENCOUNTER — OFFICE VISIT (OUTPATIENT)
Dept: FAMILY MEDICINE CLINIC | Facility: CLINIC | Age: 68
End: 2023-03-07
Payer: MEDICARE

## 2023-03-07 VITALS
TEMPERATURE: 97 F | SYSTOLIC BLOOD PRESSURE: 110 MMHG | OXYGEN SATURATION: 98 % | WEIGHT: 160.63 LBS | DIASTOLIC BLOOD PRESSURE: 60 MMHG | BODY MASS INDEX: 24.34 KG/M2 | HEART RATE: 86 BPM | RESPIRATION RATE: 18 BRPM | HEIGHT: 68 IN

## 2023-03-07 DIAGNOSIS — R14.0 BLOATING: ICD-10-CM

## 2023-03-07 DIAGNOSIS — N28.9 KIDNEY LESION: Primary | ICD-10-CM

## 2023-03-07 DIAGNOSIS — N28.9 KIDNEY LESION, NATIVE, LEFT: ICD-10-CM

## 2023-03-07 DIAGNOSIS — I70.1 ATHEROSCLEROTIC RAS (RENAL ARTERY STENOSIS), UNILATERAL (HCC): Primary | ICD-10-CM

## 2023-03-07 DIAGNOSIS — R10.32 LEFT LOWER QUADRANT ABDOMINAL PAIN: ICD-10-CM

## 2023-03-07 DIAGNOSIS — K76.9 LIVER LESION: ICD-10-CM

## 2023-03-07 PROCEDURE — 99214 OFFICE O/P EST MOD 30 MIN: CPT | Performed by: FAMILY MEDICINE

## 2023-03-07 RX ORDER — OMEPRAZOLE 40 MG/1
40 CAPSULE, DELAYED RELEASE ORAL DAILY
COMMUNITY
Start: 2023-02-15

## 2023-03-07 NOTE — TELEPHONE ENCOUNTER
Patient had CT of abdomen/pelvis (results listed below). They recommend an MRI of the kidneys but there is not an order in our system and I am not sure what the equivalent would be. Can we call radiology to clarify? Also, can you let the patient know the contact info for Vascular surgery?     Thanks,  Rossy Hi, DO

## 2023-03-07 NOTE — TELEPHONE ENCOUNTER
Tristen Simons MD [address]  [facility]  [phone]   Jenifer Deleon

## 2023-03-08 NOTE — TELEPHONE ENCOUNTER
Called MRI department and they said to order an MRI of abdomin with and without then put CT results in comment section

## 2023-03-21 ENCOUNTER — HOSPITAL ENCOUNTER (OUTPATIENT)
Dept: MRI IMAGING | Age: 68
Discharge: HOME | End: 2023-03-21
Attending: FAMILY MEDICINE
Payer: MEDICARE

## 2023-03-21 DIAGNOSIS — N28.9 KIDNEY LESION: ICD-10-CM

## 2023-03-21 DIAGNOSIS — N28.1 COMPLEX RENAL CYST: Primary | ICD-10-CM

## 2023-03-21 PROCEDURE — 74183 MRI ABD W/O CNTR FLWD CNTR: CPT | Performed by: FAMILY MEDICINE

## 2023-03-21 PROCEDURE — A9575 INJ GADOTERATE MEGLUMI 0.1ML: HCPCS | Performed by: FAMILY MEDICINE

## 2023-03-21 RX ORDER — GADOTERATE MEGLUMINE 376.9 MG/ML
15 INJECTION INTRAVENOUS
Status: COMPLETED | OUTPATIENT
Start: 2023-03-21 | End: 2023-03-21

## 2023-03-21 RX ADMIN — GADOTERATE MEGLUMINE 15 ML: 376.9 INJECTION INTRAVENOUS at 08:56:00

## 2023-03-22 NOTE — PROGRESS NOTES
Spoke with Avtar Feng. She reviewed 's comments and recommendations and had no additional questions. She has an appointment with Delia Charlton at the end of April. (The earliest appointment available).

## 2023-04-11 ENCOUNTER — OFFICE VISIT (OUTPATIENT)
Dept: SURGERY | Facility: CLINIC | Age: 68
End: 2023-04-11

## 2023-04-11 DIAGNOSIS — N28.1 COMPLEX RENAL CYST: Primary | ICD-10-CM

## 2023-04-11 LAB
APPEARANCE: CLEAR
BILIRUBIN: NEGATIVE
GLUCOSE (URINE DIPSTICK): NEGATIVE MG/DL
KETONES (URINE DIPSTICK): NEGATIVE MG/DL
LEUKOCYTES: NEGATIVE
MULTISTIX LOT#: ABNORMAL NUMERIC
NITRITE, URINE: NEGATIVE
PH, URINE: 6.5 (ref 4.5–8)
PROTEIN (URINE DIPSTICK): NEGATIVE MG/DL
SPECIFIC GRAVITY: 1.02 (ref 1–1.03)
URINE-COLOR: YELLOW
UROBILINOGEN,SEMI-QN: 0.2 MG/DL (ref 0–1.9)

## 2023-04-11 PROCEDURE — 99203 OFFICE O/P NEW LOW 30 MIN: CPT | Performed by: SURGERY

## 2023-04-11 PROCEDURE — 81003 URINALYSIS AUTO W/O SCOPE: CPT | Performed by: SURGERY

## 2023-04-11 RX ORDER — DESONIDE 0.5 MG/G
OINTMENT TOPICAL
COMMUNITY
Start: 2023-03-29

## 2023-04-12 NOTE — PROGRESS NOTES
Jose Guallpa,    I have reviewed your urine test results. Tests show no significant abnormalities (no signs of microscopic red blood cells in the urine). No changes to the plan as we had previously discussed. Please let me know if you have any questions.     Thanks,  Olive Delgado MD

## 2023-05-10 ENCOUNTER — HOSPITAL ENCOUNTER (OUTPATIENT)
Dept: CT IMAGING | Facility: HOSPITAL | Age: 68
Discharge: HOME | End: 2023-05-10
Attending: FAMILY MEDICINE
Payer: MEDICARE

## 2023-05-10 DIAGNOSIS — R91.8 MULTIPLE PULMONARY NODULES: ICD-10-CM

## 2023-05-10 PROCEDURE — 71250 CT THORAX DX C-: CPT | Performed by: FAMILY MEDICINE

## 2023-05-12 DIAGNOSIS — R91.1 INCIDENTAL LUNG NODULE, GREATER THAN OR EQUAL TO 8MM: Primary | ICD-10-CM

## 2023-06-13 ENCOUNTER — OFFICE VISIT (OUTPATIENT)
Dept: FAMILY MEDICINE CLINIC | Facility: CLINIC | Age: 68
End: 2023-06-13
Payer: MEDICARE

## 2023-06-13 VITALS
OXYGEN SATURATION: 98 % | HEIGHT: 68 IN | BODY MASS INDEX: 23.95 KG/M2 | DIASTOLIC BLOOD PRESSURE: 62 MMHG | WEIGHT: 158 LBS | RESPIRATION RATE: 16 BRPM | TEMPERATURE: 97 F | HEART RATE: 74 BPM | SYSTOLIC BLOOD PRESSURE: 128 MMHG

## 2023-06-13 DIAGNOSIS — K21.9 GASTROESOPHAGEAL REFLUX DISEASE, UNSPECIFIED WHETHER ESOPHAGITIS PRESENT: Primary | ICD-10-CM

## 2023-06-13 DIAGNOSIS — R91.8 MULTIPLE LUNG NODULES ON CT: ICD-10-CM

## 2023-06-13 DIAGNOSIS — I70.1 ATHEROSCLEROTIC RAS (RENAL ARTERY STENOSIS), UNILATERAL (HCC): ICD-10-CM

## 2023-06-13 DIAGNOSIS — N28.9 KIDNEY LESION: ICD-10-CM

## 2023-06-13 PROCEDURE — 99214 OFFICE O/P EST MOD 30 MIN: CPT | Performed by: FAMILY MEDICINE

## 2023-06-13 RX ORDER — OMEPRAZOLE 40 MG/1
40 CAPSULE, DELAYED RELEASE ORAL DAILY
Refills: 0 | Status: CANCELLED | OUTPATIENT
Start: 2023-06-13

## 2023-06-13 RX ORDER — OMEPRAZOLE 20 MG/1
20 CAPSULE, DELAYED RELEASE ORAL 2 TIMES DAILY
Qty: 60 CAPSULE | Refills: 0 | Status: SHIPPED | OUTPATIENT
Start: 2023-06-13

## 2023-07-06 ENCOUNTER — TELEPHONE (OUTPATIENT)
Dept: FAMILY MEDICINE CLINIC | Facility: CLINIC | Age: 68
End: 2023-07-06

## 2023-07-06 NOTE — TELEPHONE ENCOUNTER
Called and talked to patient she is having plastic surgery on eyes and needs an EKG. She has an order from Dr Robert Callaway for this I explained that with medicare that we cannot do this in the office so she will take the order to the hospital to get it done.

## 2023-07-07 ENCOUNTER — EKG ENCOUNTER (OUTPATIENT)
Dept: LAB | Facility: HOSPITAL | Age: 68
End: 2023-07-07
Attending: OTOLARYNGOLOGY
Payer: MEDICARE

## 2023-07-07 DIAGNOSIS — H02.34 BLEPHAROCHALASIS OF LEFT UPPER EYELID: Primary | ICD-10-CM

## 2023-07-07 PROCEDURE — 93005 ELECTROCARDIOGRAM TRACING: CPT

## 2023-07-07 PROCEDURE — 93010 ELECTROCARDIOGRAM REPORT: CPT | Performed by: INTERNAL MEDICINE

## 2023-07-08 LAB
ATRIAL RATE: 69 BPM
P AXIS: 67 DEGREES
P-R INTERVAL: 176 MS
Q-T INTERVAL: 422 MS
QRS DURATION: 96 MS
QTC CALCULATION (BEZET): 452 MS
R AXIS: 43 DEGREES
T AXIS: 54 DEGREES
VENTRICULAR RATE: 69 BPM

## 2023-07-22 DIAGNOSIS — K21.9 GASTROESOPHAGEAL REFLUX DISEASE, UNSPECIFIED WHETHER ESOPHAGITIS PRESENT: ICD-10-CM

## 2023-07-24 NOTE — TELEPHONE ENCOUNTER
Refill request for:    Requested Prescriptions     Pending Prescriptions Disp Refills    OMEPRAZOLE 20 MG Oral Capsule Delayed Release [Pharmacy Med Name: Omeprazole Oral Capsule Delayed Release 20 MG] 60 capsule 0     Sig: TAKE 1 CAPSULES BY MOUTH 2 TIMES A DAY IN THE MORNING AND BEFORE BEDTIME        Last Prescribed Quantity Refills   06/13/23 60caps 0     LOV 6/13/2023     Patient was asked to follow-up in: 6 months    Appointment due: December 2023    Appointment scheduled: Visit date not found    Medication not on protocol.      Routed to DO Monica for review

## 2023-07-25 RX ORDER — OMEPRAZOLE 20 MG/1
CAPSULE, DELAYED RELEASE ORAL
Qty: 180 CAPSULE | Refills: 0 | Status: SHIPPED | OUTPATIENT
Start: 2023-07-25

## 2023-09-18 ENCOUNTER — TELEPHONE (OUTPATIENT)
Dept: FAMILY MEDICINE CLINIC | Facility: CLINIC | Age: 68
End: 2023-09-18

## 2023-09-18 DIAGNOSIS — E78.00 PURE HYPERCHOLESTEROLEMIA: ICD-10-CM

## 2023-09-18 DIAGNOSIS — I25.10 CORONARY ARTERY DISEASE INVOLVING NATIVE CORONARY ARTERY OF NATIVE HEART WITHOUT ANGINA PECTORIS: ICD-10-CM

## 2023-09-18 RX ORDER — ROSUVASTATIN CALCIUM 5 MG/1
5 TABLET, COATED ORAL NIGHTLY
Qty: 90 TABLET | Refills: 0 | Status: SHIPPED | OUTPATIENT
Start: 2023-09-18

## 2023-09-19 ENCOUNTER — TELEPHONE (OUTPATIENT)
Dept: FAMILY MEDICINE CLINIC | Facility: CLINIC | Age: 68
End: 2023-09-19

## 2023-09-19 DIAGNOSIS — Z00.00 ROUTINE HEALTH MAINTENANCE: Primary | ICD-10-CM

## 2023-09-19 NOTE — TELEPHONE ENCOUNTER
Please enter lab orders for the patient's upcoming physical appointment. Physical scheduled: Your appointments       Date & Time Appointment Department [facility])    Oct 26, 2023 10:00 AM CDT Adult Physical with Harrison Dela Cruz MD [address], [address] [facility], [address] [place] [address] ([address] [facility])    PLEASE NOTE - Most insurances allow a Complete Physical once every 366 days. Please schedule accordingly. Please arrive 15 minutes prior to your scheduled appointment. Please also bring your Insurance card, Photo ID, and your medication bottles or a list of your current medication. If you no longer require this appointment, please contact your physician office to cancel. Nov 28, 2023  8:00 AM CST Physical - Established with Leonel Zuleta DO [facility], [address] [place] [address], [place] (Irina Khan)              [address] [facility]  [phone] [address] [address]  [phone]           Preferred lab: [facility] CANCER CTR & RESEARCH INST)     The patient has been notified to complete fasting labs prior to their physical appointment.

## 2023-10-09 ENCOUNTER — TELEPHONE (OUTPATIENT)
Dept: SURGERY | Facility: CLINIC | Age: 68
End: 2023-10-09

## 2023-10-09 NOTE — TELEPHONE ENCOUNTER
Pt asking can appt be changed to a telehealth instead of office visit states it's to discus imaging please advise

## 2023-10-11 ENCOUNTER — HOSPITAL ENCOUNTER (OUTPATIENT)
Dept: CT IMAGING | Facility: HOSPITAL | Age: 68
Discharge: HOME | End: 2023-10-11
Attending: SURGERY
Payer: MEDICARE

## 2023-10-11 DIAGNOSIS — N28.1 COMPLEX RENAL CYST: ICD-10-CM

## 2023-10-11 PROCEDURE — 74178 CT ABD&PLV WO CNTR FLWD CNTR: CPT | Performed by: SURGERY

## 2023-10-11 NOTE — TELEPHONE ENCOUNTER
Patient is calling back to find out if her appointment tomorrow 10/12/23, can be changed to a virtual visit?

## 2023-10-12 ENCOUNTER — TELEMEDICINE (OUTPATIENT)
Dept: SURGERY | Facility: CLINIC | Age: 68
End: 2023-10-12

## 2023-10-12 DIAGNOSIS — N20.0 NEPHROLITHIASIS: ICD-10-CM

## 2023-10-12 DIAGNOSIS — N28.1 RENAL CYST: Primary | ICD-10-CM

## 2023-10-12 PROCEDURE — 99213 OFFICE O/P EST LOW 20 MIN: CPT | Performed by: SURGERY

## 2023-10-12 NOTE — PROGRESS NOTES
Urology Clinic Note  Telemedicine Visit  Audio & Video  The patient consented to performing this visit virtually. Primary Care Provider:  Kristal Gallegos DO     Chief Complaint:   Renal cyst    HPI:   Belinda Munoz is a 79year old female with history of hypertension, hyperlipidemia, STAR referred for finding on a recent abdominal MRI 3/21/2023 of a 2.4 cm exophytic left lower pole minimally complex renal cyst with likely hemorrhagic or proteinaceous content. She initially got the MRI given epigastric abdominal pain and left lower quadrant pain. She denies left-sided flank pain or gross hematuria. I reviewed her MRI imaging. There is no prior imaging to compare to. Initial visit with me on 4/11/2023 we discussed surveillance with repeat imaging in 6 months. CT scan from 10/11/2023 showed the same left renal cyst to be stable in size (still 2.4 cm) and likely consistent with a hemorrhagic cyst still. She is doing well today. She has no urologic complaints.     History:     Past Medical History:   Diagnosis Date    Abdominal distention     Abdominal pain     Anesthesia complication     Belching     Bloating     Chest pain     Constipation     Diarrhea, unspecified     Essential hypertension     Flatulence/gas pain/belching     Heart palpitations     High cholesterol     History of cardiac murmur     HLD (hyperlipidemia)     HTN (hypertension)     Human papilloma virus infection 2017    Indigestion     Irregular bowel habits     Nausea     Night sweats     Paroxysmal SVT (supraventricular tachycardia) (HCC)     PONV (postoperative nausea and vomiting)     Sleep apnea     Visual impairment     contacts and glasses    Wears glasses        Past Surgical History:   Procedure Laterality Date    COLONOSCOPY  03/09/2009    COLONOSCOPY      EGD      EYE SURGERY Right     eyelid    OTHER      LEX and right hip injection    REMOVAL OF COCCYX      WISDOM TEETH REMOVED         Family History   Problem Relation Age of Onset    Heart Disease Mother     Hypertension Mother     Heart Attack Mother     Stroke Mother     Other (rheumatoid arthritis) Mother     Hypertension Father     Heart Attack Father 76    No Known Problems Sister        Social History     Socioeconomic History    Marital status:    Occupational History    Occupation: RN     Comment: retired   Tobacco Use    Smoking status: Former     Packs/day: 0     Types: Cigarettes     Quit date: 1987     Years since quittin.0    Smokeless tobacco: Never   Vaping Use    Vaping Use: Never used   Substance and Sexual Activity    Alcohol use: Yes     Alcohol/week: 7.0 standard drinks of alcohol     Types: 7 Glasses of wine per week     Comment: soc    Drug use: No    Sexual activity: Yes     Partners: Male   Other Topics Concern    Caffeine Concern No    Exercise Yes     Comment:  yoga twice weekly    Seat Belt Yes    Self-Exams No     Comment: Every 3 months       Medications (Active prior to today's visit):  Current Outpatient Medications   Medication Sig Dispense Refill    rosuvastatin 5 MG Oral Tab Take 1 tablet (5 mg total) by mouth nightly. 90 tablet 0    omeprazole 20 MG Oral Capsule Delayed Release TAKE 1 CAPSULES BY MOUTH 2 TIMES A DAY IN THE MORNING AND BEFORE BEDTIME 180 capsule 0    DILTIAZEM HCL ER COATED BEADS 180 MG Oral Capsule SR 24 Hr TAKE 1 CAPSULE BY MOUTH TWICE  DAILY 180 capsule 3    Desonide 0.05 % External Ointment APPLY TO AFFECTED AREA ON THE NECK TWICE A DAY FOR 1-2 WEEKS, TAKE A WEEK BREAK AND REPEAT AS NEEDED      citalopram 20 MG Oral Tab Take 1 tablet (20 mg total) by mouth daily. 90 tablet 3    losartan 100 MG Oral Tab Take 1 tablet (100 mg total) by mouth daily. 90 tablet 1    aspirin 81 MG Oral Tab EC Take 1 tablet (81 mg total) by mouth daily. Calcium Carb-Cholecalciferol (CALCIUM + D3) 600-200 MG-UNIT Oral Tab Take 1 tablet by mouth daily. multivitamin Oral Tab Take 1 tablet by mouth daily. Allergies:    Atenolol                HIVES  Beta Adrenergic Blo*    HIVES    Comment:Atenolo, Toprol. Duloxetine              OTHER (SEE COMMENTS)  Penicillins             HIVES  Sulfa Antibiotics       SWELLING    Comment:Other reaction(s): Other (See Comments)             Severe joint pain             Other reaction(s): Swelling  Duloxetine Hcl          PALPITATIONS    Comment:Other reaction(s): Other (See Comments)  Lisinopril              Coughing  Simvastatin                 Comment:Other reaction(s): Altered Mental Status    Review of Systems:   A comprehensive 10-point review of systems was completed. Pertinent positives and negatives are noted in the the HPI. Physical Exam:   CONSTITUTIONAL: Well developed, well nourished, in no acute distress  NEUROLOGIC: Alert and oriented, normal speech  EYES: Sclera non-icteric  HEAD: Normocephalic, atraumatic  ENT: Hearing intact  RESPIRATORY: Normal respiratory effort    Assessment & Plan:   Loretta Pablo is a 79year old female with history of hypertension, hyperlipidemia, STAR referred for finding on a recent abdominal MRI 3/21/2023 of a 2.4 cm exophytic left lower pole minimally complex renal cyst with likely hemorrhagic or proteinaceous content. She initially got the MRI given epigastric abdominal pain and left lower quadrant pain. She denies left-sided flank pain or gross hematuria. I reviewed her MRI imaging. There is no prior imaging to compare to. Initial visit with me on 4/11/2023 we discussed surveillance with repeat imaging in 6 months. CT scan from 10/11/2023 showed the same left renal cyst to be stable in size (still 2.4 cm) and likely consistent with a hemorrhagic cyst still. She is doing well today. She has no urologic complaints.     -Repeat renal ultrasound in 1 year    In total, 20 minutes were spent on this patient encounter (including chart review, patient history, physical, and counseling, documentation, and communication). J Carlos Beckett.  Mahesh Kauffman MD  Staff Urologist  [facility]  Office: [phone]

## 2023-10-17 ENCOUNTER — OFFICE VISIT (OUTPATIENT)
Dept: OBGYN CLINIC | Facility: CLINIC | Age: 68
End: 2023-10-17
Payer: MEDICARE

## 2023-10-17 VITALS
DIASTOLIC BLOOD PRESSURE: 68 MMHG | WEIGHT: 162.38 LBS | SYSTOLIC BLOOD PRESSURE: 120 MMHG | BODY MASS INDEX: 25 KG/M2 | HEART RATE: 77 BPM

## 2023-10-17 DIAGNOSIS — Z01.419 WELL WOMAN EXAM WITH ROUTINE GYNECOLOGICAL EXAM: ICD-10-CM

## 2023-10-17 DIAGNOSIS — Z12.31 ENCOUNTER FOR SCREENING MAMMOGRAM FOR MALIGNANT NEOPLASM OF BREAST: ICD-10-CM

## 2023-10-17 DIAGNOSIS — Z12.4 SCREENING FOR CERVICAL CANCER: Primary | ICD-10-CM

## 2023-10-17 PROCEDURE — 87624 HPV HI-RISK TYP POOLED RSLT: CPT | Performed by: OBSTETRICS & GYNECOLOGY

## 2023-10-17 PROCEDURE — 88175 CYTOPATH C/V AUTO FLUID REDO: CPT | Performed by: OBSTETRICS & GYNECOLOGY

## 2023-10-17 PROCEDURE — G0101 CA SCREEN;PELVIC/BREAST EXAM: HCPCS | Performed by: OBSTETRICS & GYNECOLOGY

## 2023-10-18 ENCOUNTER — TELEPHONE (OUTPATIENT)
Dept: SURGERY | Facility: CLINIC | Age: 68
End: 2023-10-18

## 2023-10-22 DIAGNOSIS — N95.1 MENOPAUSAL AND FEMALE CLIMACTERIC STATES: ICD-10-CM

## 2023-10-23 LAB
.: NORMAL
.: NORMAL
HPV I/H RISK 1 DNA SPEC QL NAA+PROBE: NEGATIVE

## 2023-10-23 NOTE — TELEPHONE ENCOUNTER
Refill request for:    Requested Prescriptions     Pending Prescriptions Disp Refills    CITALOPRAM 20 MG Oral Tab [Pharmacy Med Name: Citalopram Hydrobromide 20 MG Oral Tablet] 90 tablet 3     Sig: TAKE 1 TABLET BY MOUTH DAILY        Last Prescribed Quantity Refills   11/10/22 90tabs 3     LOV 6/13/2023     Patient was asked to follow-up in: 6 months    Appointment due: December 2023    Appointment scheduled: 11/28/2023 Oni Arreola DO    Medication not on protocol.      Routed to DO Monica for review

## 2023-10-24 DIAGNOSIS — K21.9 GASTROESOPHAGEAL REFLUX DISEASE, UNSPECIFIED WHETHER ESOPHAGITIS PRESENT: ICD-10-CM

## 2023-10-24 RX ORDER — CITALOPRAM 20 MG/1
20 TABLET ORAL DAILY
Qty: 90 TABLET | Refills: 3 | Status: SHIPPED | OUTPATIENT
Start: 2023-10-24

## 2023-10-24 RX ORDER — OMEPRAZOLE 20 MG/1
CAPSULE, DELAYED RELEASE ORAL
Qty: 180 CAPSULE | Refills: 0 | Status: SHIPPED | OUTPATIENT
Start: 2023-10-24

## 2023-10-24 NOTE — TELEPHONE ENCOUNTER
Refill request for:    Requested Prescriptions     Pending Prescriptions Disp Refills    OMEPRAZOLE 20 MG Oral Capsule Delayed Release [Pharmacy Med Name: Omeprazole Oral Capsule Delayed Release 20 MG] 180 capsule 0     Sig: TAKE 1 CAPSULES BY MOUTH 2 TIMES A DAY IN THE MORNING AND BEFORE BEDTIME        Last Prescribed Quantity Refills   07/25/23 180caps 0     LOV 6/13/2023     Patient was asked to follow-up in: 6 months    Appointment due: December 2023    Appointment scheduled: 11/28/2023 Mary Mcallister DO    Medication not on protocol.      Routed to DO Monica for review

## 2023-11-19 DIAGNOSIS — E78.00 PURE HYPERCHOLESTEROLEMIA: ICD-10-CM

## 2023-11-19 DIAGNOSIS — I25.10 CORONARY ARTERY DISEASE INVOLVING NATIVE CORONARY ARTERY OF NATIVE HEART WITHOUT ANGINA PECTORIS: ICD-10-CM

## 2023-11-20 RX ORDER — ROSUVASTATIN CALCIUM 5 MG/1
5 TABLET, COATED ORAL NIGHTLY
Qty: 90 TABLET | Refills: 0 | Status: SHIPPED | OUTPATIENT
Start: 2023-11-20

## 2023-11-20 NOTE — TELEPHONE ENCOUNTER
Requested Prescriptions     Pending Prescriptions Disp Refills    ROSUVASTATIN 5 MG Oral Tab [Pharmacy Med Name: Rosuvastatin Calcium 5 MG Oral Tablet] 90 tablet 3     Sig: TAKE 1 TABLET BY MOUTH EVERY  NIGHT     LOV 6/13/2023     Patient was asked to follow-up in: 6 months    Appointment scheduled: 11/28/2023 Dilma Victoria, DO     Medication refilled per protocol.

## 2023-11-27 ENCOUNTER — LAB ENCOUNTER (OUTPATIENT)
Dept: LAB | Facility: HOSPITAL | Age: 68
End: 2023-11-27
Attending: FAMILY MEDICINE
Payer: MEDICARE

## 2023-11-27 DIAGNOSIS — Z00.00 ROUTINE HEALTH MAINTENANCE: ICD-10-CM

## 2023-11-27 LAB
ALBUMIN SERPL-MCNC: 3.7 G/DL (ref 3.4–5)
ALBUMIN/GLOB SERPL: 1 {RATIO} (ref 1–2)
ALP LIVER SERPL-CCNC: 88 U/L
ALT SERPL-CCNC: 23 U/L
ANION GAP SERPL CALC-SCNC: 3 MMOL/L (ref 0–18)
AST SERPL-CCNC: 17 U/L (ref 15–37)
BASOPHILS # BLD AUTO: 0.06 X10(3) UL (ref 0–0.2)
BASOPHILS NFR BLD AUTO: 0.9 %
BILIRUB SERPL-MCNC: 0.7 MG/DL (ref 0.1–2)
BUN BLD-MCNC: 16 MG/DL (ref 9–23)
CALCIUM BLD-MCNC: 8.8 MG/DL (ref 8.5–10.1)
CHLORIDE SERPL-SCNC: 109 MMOL/L (ref 98–112)
CHOLEST SERPL-MCNC: 190 MG/DL (ref ?–200)
CO2 SERPL-SCNC: 25 MMOL/L (ref 21–32)
CREAT BLD-MCNC: 0.91 MG/DL
EGFRCR SERPLBLD CKD-EPI 2021: 69 ML/MIN/1.73M2 (ref 60–?)
EOSINOPHIL # BLD AUTO: 0.07 X10(3) UL (ref 0–0.7)
EOSINOPHIL NFR BLD AUTO: 1 %
ERYTHROCYTE [DISTWIDTH] IN BLOOD BY AUTOMATED COUNT: 13 %
FASTING PATIENT LIPID ANSWER: YES
FASTING STATUS PATIENT QL REPORTED: YES
GLOBULIN PLAS-MCNC: 3.7 G/DL (ref 2.8–4.4)
GLUCOSE BLD-MCNC: 101 MG/DL (ref 70–99)
HCT VFR BLD AUTO: 39.3 %
HDLC SERPL-MCNC: 80 MG/DL (ref 40–59)
HGB BLD-MCNC: 12.7 G/DL
IMM GRANULOCYTES # BLD AUTO: 0.03 X10(3) UL (ref 0–1)
IMM GRANULOCYTES NFR BLD: 0.4 %
LDLC SERPL CALC-MCNC: 96 MG/DL (ref ?–100)
LYMPHOCYTES # BLD AUTO: 1.79 X10(3) UL (ref 1–4)
LYMPHOCYTES NFR BLD AUTO: 26.4 %
MCH RBC QN AUTO: 31.8 PG (ref 26–34)
MCHC RBC AUTO-ENTMCNC: 32.3 G/DL (ref 31–37)
MCV RBC AUTO: 98.3 FL
MONOCYTES # BLD AUTO: 0.49 X10(3) UL (ref 0.1–1)
MONOCYTES NFR BLD AUTO: 7.2 %
NEUTROPHILS # BLD AUTO: 4.33 X10 (3) UL (ref 1.5–7.7)
NEUTROPHILS # BLD AUTO: 4.33 X10(3) UL (ref 1.5–7.7)
NEUTROPHILS NFR BLD AUTO: 64.1 %
NONHDLC SERPL-MCNC: 110 MG/DL (ref ?–130)
OSMOLALITY SERPL CALC.SUM OF ELEC: 285 MOSM/KG (ref 275–295)
PLATELET # BLD AUTO: 369 10(3)UL (ref 150–450)
POTASSIUM SERPL-SCNC: 4.3 MMOL/L (ref 3.5–5.1)
PROT SERPL-MCNC: 7.4 G/DL (ref 6.4–8.2)
RBC # BLD AUTO: 4 X10(6)UL
SODIUM SERPL-SCNC: 137 MMOL/L (ref 136–145)
TRIGL SERPL-MCNC: 75 MG/DL (ref 30–149)
TSI SER-ACNC: 2.86 MIU/ML (ref 0.36–3.74)
VLDLC SERPL CALC-MCNC: 12 MG/DL (ref 0–30)
WBC # BLD AUTO: 6.8 X10(3) UL (ref 4–11)

## 2023-11-27 PROCEDURE — 36415 COLL VENOUS BLD VENIPUNCTURE: CPT

## 2023-11-27 PROCEDURE — 85025 COMPLETE CBC W/AUTO DIFF WBC: CPT

## 2023-11-27 PROCEDURE — 84443 ASSAY THYROID STIM HORMONE: CPT

## 2023-11-27 PROCEDURE — 80053 COMPREHEN METABOLIC PANEL: CPT

## 2023-11-27 PROCEDURE — 80061 LIPID PANEL: CPT

## 2023-11-28 ENCOUNTER — OFFICE VISIT (OUTPATIENT)
Dept: FAMILY MEDICINE CLINIC | Facility: CLINIC | Age: 68
End: 2023-11-28
Payer: MEDICARE

## 2023-11-28 VITALS
OXYGEN SATURATION: 98 % | WEIGHT: 160 LBS | HEIGHT: 68 IN | BODY MASS INDEX: 24.25 KG/M2 | HEART RATE: 78 BPM | RESPIRATION RATE: 16 BRPM | SYSTOLIC BLOOD PRESSURE: 132 MMHG | TEMPERATURE: 97 F | DIASTOLIC BLOOD PRESSURE: 62 MMHG

## 2023-11-28 DIAGNOSIS — R10.84 COLICKY ABDOMINAL PAIN: ICD-10-CM

## 2023-11-28 DIAGNOSIS — I10 ESSENTIAL HYPERTENSION: ICD-10-CM

## 2023-11-28 DIAGNOSIS — N95.1 MENOPAUSAL AND FEMALE CLIMACTERIC STATES: ICD-10-CM

## 2023-11-28 DIAGNOSIS — Z12.31 VISIT FOR SCREENING MAMMOGRAM: ICD-10-CM

## 2023-11-28 DIAGNOSIS — I47.10 PAROXYSMAL SVT (SUPRAVENTRICULAR TACHYCARDIA): ICD-10-CM

## 2023-11-28 DIAGNOSIS — K21.9 GASTROESOPHAGEAL REFLUX DISEASE, UNSPECIFIED WHETHER ESOPHAGITIS PRESENT: ICD-10-CM

## 2023-11-28 DIAGNOSIS — I25.10 CORONARY ARTERY DISEASE INVOLVING NATIVE CORONARY ARTERY OF NATIVE HEART WITHOUT ANGINA PECTORIS: ICD-10-CM

## 2023-11-28 DIAGNOSIS — Z00.00 ENCOUNTER FOR ANNUAL HEALTH EXAMINATION: Primary | ICD-10-CM

## 2023-11-28 DIAGNOSIS — E78.00 PURE HYPERCHOLESTEROLEMIA: ICD-10-CM

## 2023-11-28 DIAGNOSIS — E03.9 HYPOTHYROIDISM, UNSPECIFIED TYPE: ICD-10-CM

## 2023-11-28 DIAGNOSIS — J42 CHRONIC BRONCHITIS, UNSPECIFIED CHRONIC BRONCHITIS TYPE (HCC): ICD-10-CM

## 2023-11-28 DIAGNOSIS — M46.1 SACROILIITIS (HCC): ICD-10-CM

## 2023-11-28 PROCEDURE — 1126F AMNT PAIN NOTED NONE PRSNT: CPT | Performed by: FAMILY MEDICINE

## 2023-11-28 PROCEDURE — 99214 OFFICE O/P EST MOD 30 MIN: CPT | Performed by: FAMILY MEDICINE

## 2023-11-28 PROCEDURE — G0439 PPPS, SUBSEQ VISIT: HCPCS | Performed by: FAMILY MEDICINE

## 2023-11-28 RX ORDER — DICYCLOMINE HYDROCHLORIDE 10 MG/1
10 CAPSULE ORAL
Qty: 42 CAPSULE | Refills: 0 | Status: SHIPPED | OUTPATIENT
Start: 2023-11-28 | End: 2023-12-12

## 2023-11-28 RX ORDER — PANTOPRAZOLE SODIUM 40 MG/1
40 TABLET, DELAYED RELEASE ORAL
Qty: 30 TABLET | Refills: 1 | Status: SHIPPED | OUTPATIENT
Start: 2023-11-28

## 2023-11-28 RX ORDER — CITALOPRAM 20 MG/1
20 TABLET ORAL DAILY
Qty: 90 TABLET | Refills: 3 | Status: SHIPPED | OUTPATIENT
Start: 2023-11-28

## 2023-11-28 RX ORDER — LOSARTAN POTASSIUM 100 MG/1
100 TABLET ORAL DAILY
Qty: 90 TABLET | Refills: 1 | Status: SHIPPED | OUTPATIENT
Start: 2023-11-28

## 2023-11-28 RX ORDER — ROSUVASTATIN CALCIUM 5 MG/1
5 TABLET, COATED ORAL NIGHTLY
Qty: 90 TABLET | Refills: 3 | Status: SHIPPED | OUTPATIENT
Start: 2023-11-28

## 2023-11-28 RX ORDER — OMEPRAZOLE 20 MG/1
CAPSULE, DELAYED RELEASE ORAL
Qty: 180 CAPSULE | Refills: 0 | Status: SHIPPED | OUTPATIENT
Start: 2023-11-28

## 2023-11-28 RX ORDER — DILTIAZEM HYDROCHLORIDE 180 MG/1
180 CAPSULE, COATED, EXTENDED RELEASE ORAL 2 TIMES DAILY
Qty: 180 CAPSULE | Refills: 3 | Status: SHIPPED | OUTPATIENT
Start: 2023-11-28

## 2023-12-01 ENCOUNTER — MED REC SCAN ONLY (OUTPATIENT)
Facility: CLINIC | Age: 68
End: 2023-12-01

## 2024-01-23 ENCOUNTER — TELEPHONE (OUTPATIENT)
Dept: FAMILY MEDICINE CLINIC | Facility: CLINIC | Age: 69
End: 2024-01-23

## 2024-01-23 DIAGNOSIS — I47.10 PAROXYSMAL SVT (SUPRAVENTRICULAR TACHYCARDIA): ICD-10-CM

## 2024-01-23 RX ORDER — DILTIAZEM HYDROCHLORIDE 180 MG/1
180 CAPSULE, COATED, EXTENDED RELEASE ORAL 2 TIMES DAILY
Qty: 180 CAPSULE | Refills: 3 | Status: SHIPPED | OUTPATIENT
Start: 2024-01-23

## 2024-01-23 NOTE — TELEPHONE ENCOUNTER
Discontinued prior rx, pended new rx with alternate pharmacy. LOV PE 11/28/2023   Pt asked to return in 6 months, Next apt 6/3/2024 Rose Ngo DO    Routed to Dr. Ngo

## 2024-01-23 NOTE — TELEPHONE ENCOUNTER
Pt is calling because her mail delivery pharmacy no longer fill her   dilTIAZem HCl ER Coated Beads 180 MG Oral Capsule SR 24 Hr     So she would like it sent to [id number] - [place], [place] - [address] [phone], [phone] [[id number]]

## 2024-01-25 RX ORDER — DILTIAZEM HYDROCHLORIDE 180 MG/1
180 CAPSULE, EXTENDED RELEASE ORAL 2 TIMES DAILY
Qty: 180 CAPSULE | Refills: 3 | Status: SHIPPED | OUTPATIENT
Start: 2024-01-25 | End: 2025-01-19

## 2024-01-25 RX ORDER — DILTIAZEM HYDROCHLORIDE 180 MG/1
180 CAPSULE, EXTENDED RELEASE ORAL DAILY
Qty: 90 CAPSULE | Refills: 3 | Status: SHIPPED | OUTPATIENT
Start: 2024-01-25 | End: 2024-01-25

## 2024-01-25 NOTE — TELEPHONE ENCOUNTER
[facility] faxed over form asking to get a new rx for   Tiadylt generic ?   Cannot order generic cardizem CD.    RX image form in [facility] in box

## 2024-01-26 NOTE — TELEPHONE ENCOUNTER
[facility] and[facility]y They need the Tildylt not the cardizam. Pls call because the wrong one keeps getting ordered    [facility] [phone]

## 2024-01-29 DIAGNOSIS — K21.9 GASTROESOPHAGEAL REFLUX DISEASE, UNSPECIFIED WHETHER ESOPHAGITIS PRESENT: ICD-10-CM

## 2024-01-29 RX ORDER — OMEPRAZOLE 20 MG/1
CAPSULE, DELAYED RELEASE ORAL
Qty: 180 CAPSULE | Refills: 0 | Status: SHIPPED | OUTPATIENT
Start: 2024-01-29

## 2024-01-29 NOTE — TELEPHONE ENCOUNTER
Refill request for:    Requested Prescriptions     Pending Prescriptions Disp Refills    omeprazole 20 MG Oral Capsule Delayed Release [Pharmacy Med Name: Omeprazole 20 MG Oral Capsule Delayed Release] 180 capsule 3     Sig: TAKE 1 CAPSULE BY MOUTH TWICE  DAILY IN THE MORNING AND BEFORE  BEDTIME        Last Prescribed Quantity Refills   11/28/23 180 0     LOV 11/28/2023     Patient was asked to follow-up in: 6 months    Appointment due: May 2024    Appointment scheduled: 6/3/2024 Rose Ngo DO    Medication not on protocol.     Routed to Rose Ngo DO for review

## 2024-02-01 ENCOUNTER — HOSPITAL ENCOUNTER (OUTPATIENT)
Dept: MAMMOGRAPHY | Facility: HOSPITAL | Age: 69
Discharge: HOME | End: 2024-02-01
Attending: FAMILY MEDICINE
Payer: MEDICARE

## 2024-02-01 DIAGNOSIS — Z12.31 VISIT FOR SCREENING MAMMOGRAM: ICD-10-CM

## 2024-02-01 PROCEDURE — 77067 SCR MAMMO BI INCL CAD: CPT | Performed by: FAMILY MEDICINE

## 2024-02-01 PROCEDURE — 77063 BREAST TOMOSYNTHESIS BI: CPT | Performed by: FAMILY MEDICINE

## 2024-03-29 ENCOUNTER — TELEPHONE (OUTPATIENT)
Dept: FAMILY MEDICINE CLINIC | Facility: CLINIC | Age: 69
End: 2024-03-29

## 2024-03-29 NOTE — TELEPHONE ENCOUNTER
Pt is calling regarding her   dilTIAZem HCl ER Beads (TIADYLT ER) 180 MG Oral Capsule SR 24 Hr 180 capsule 3   and requested to speak to Cam. It was put in in January with qty [facility] gave her 90 pills and she should have gotten 180

## 2024-03-29 NOTE — TELEPHONE ENCOUNTER
There is confusion at [facility] regarding Diltiazem 180mg capsules. It should be a twice a day sig. The pharmacy is saying the prescription instructions state daily(sent 1/23/24 and resent immediately for BID by ). Spoke with pharmacist Ysabel. She does have the prescription that was sent yesterday,3/28/24 stating Diltiazem 180mg capsule with a sig of twice a day. She will deactivate the prescription with a frequency of daily. Notified Kirsten that the pharmacy acknowledged receiving the Diltiazem 180mg capsules prescription with twice a day instructions. Kirsten verbalized understanding.

## 2024-03-30 ENCOUNTER — TELEPHONE (OUTPATIENT)
Dept: FAMILY MEDICINE CLINIC | Facility: CLINIC | Age: 69
End: 2024-03-30

## 2024-03-30 DIAGNOSIS — K21.9 GASTROESOPHAGEAL REFLUX DISEASE, UNSPECIFIED WHETHER ESOPHAGITIS PRESENT: ICD-10-CM

## 2024-04-01 NOTE — TELEPHONE ENCOUNTER
Refill request for:    Requested Prescriptions     Pending Prescriptions Disp Refills    OMEPRAZOLE 20 MG Oral Capsule Delayed Release [Pharmacy Med Name: Omeprazole 20 MG Oral Capsule Delayed Release] 180 capsule 3     Sig: TAKE 1 CAPSULE BY MOUTH TWICE  DAILY IN THE MORNING AND BEFORE  BEDTIME        Last Prescribed Quantity Refills   1/29/24 180 0     LOV 11/28/2023     Patient was asked to follow-up in: 6 months    Appointment due: May 2024    Appointment scheduled: 6/3/2024 Rose Ngo DO    Medication not on protocol.     # 180 with 0 refills routed to Rose Ngo DO for review

## 2024-04-02 RX ORDER — OMEPRAZOLE 20 MG/1
CAPSULE, DELAYED RELEASE ORAL
Qty: 180 CAPSULE | Refills: 3 | Status: SHIPPED | OUTPATIENT
Start: 2024-04-02

## 2024-05-10 ENCOUNTER — TELEPHONE (OUTPATIENT)
Dept: FAMILY MEDICINE CLINIC | Facility: CLINIC | Age: 69
End: 2024-05-10

## 2024-05-10 DIAGNOSIS — R91.1 INCIDENTAL LUNG NODULE, GREATER THAN OR EQUAL TO 8MM: Primary | ICD-10-CM

## 2024-05-10 NOTE — TELEPHONE ENCOUNTER
Pt is calling she needs a new order entered for her CT scan the one in the chart expires Sunday and she was trying to make the appt for Monday. Please reorder.please call patient when entered.

## 2024-05-29 ENCOUNTER — HOSPITAL ENCOUNTER (OUTPATIENT)
Dept: CT IMAGING | Facility: HOSPITAL | Age: 69
Discharge: HOME | End: 2024-05-29
Attending: FAMILY MEDICINE
Payer: MEDICARE

## 2024-05-29 DIAGNOSIS — R91.1 INCIDENTAL LUNG NODULE, GREATER THAN OR EQUAL TO 8MM: ICD-10-CM

## 2024-05-29 PROCEDURE — 71250 CT THORAX DX C-: CPT | Performed by: FAMILY MEDICINE

## 2024-06-03 ENCOUNTER — TELEPHONE (OUTPATIENT)
Dept: FAMILY MEDICINE CLINIC | Facility: CLINIC | Age: 69
End: 2024-06-03

## 2024-06-03 DIAGNOSIS — R91.1 INCIDENTAL LUNG NODULE, GREATER THAN OR EQUAL TO 8MM: Primary | ICD-10-CM

## 2024-06-03 NOTE — TELEPHONE ENCOUNTER
Patient has some questions regarding her medication refills, how many she has left before she needs to see Dr. Ngo again for her annual in November. Patient is scheduled for a follow up Friday 06/07/24.

## 2024-06-04 ENCOUNTER — TELEPHONE (OUTPATIENT)
Dept: FAMILY MEDICINE CLINIC | Facility: CLINIC | Age: 69
End: 2024-06-04

## 2024-06-05 NOTE — TELEPHONE ENCOUNTER
Rose Gayathrisesar, DO  6/4/2024  9:02 PM CDT       The lung nodule in the left lower lobe of the lungs just a little bit bigger than previous.  Given this change in size, we recommend having appointment with pulmonology to review the imaging and determine next steps.  I have placed a referral for Dr. Sampson.  This should appear in your MyChart.     Please let me know if you have any questions.  Rose Ngo, DO 6/4/2024 9:02 PM           Referred to Provider Information:  Provider Address Phone   Allison Griffiths  [address]  [facility] [address] [phone]

## 2024-06-06 ENCOUNTER — OFFICE VISIT (OUTPATIENT)
Facility: CLINIC | Age: 69
End: 2024-06-06
Payer: MEDICARE

## 2024-06-06 VITALS
HEART RATE: 91 BPM | SYSTOLIC BLOOD PRESSURE: 138 MMHG | BODY MASS INDEX: 24.25 KG/M2 | OXYGEN SATURATION: 97 % | WEIGHT: 160 LBS | HEIGHT: 68 IN | RESPIRATION RATE: 16 BRPM | DIASTOLIC BLOOD PRESSURE: 86 MMHG | TEMPERATURE: 98 F

## 2024-06-06 DIAGNOSIS — I10 HTN (HYPERTENSION), BENIGN: Primary | ICD-10-CM

## 2024-06-06 DIAGNOSIS — J45.20 INTERMITTENT ASTHMA WITHOUT COMPLICATION, UNSPECIFIED ASTHMA SEVERITY (HCC): ICD-10-CM

## 2024-06-06 DIAGNOSIS — J30.9 ALLERGIC RHINITIS, UNSPECIFIED SEASONALITY, UNSPECIFIED TRIGGER: ICD-10-CM

## 2024-06-06 DIAGNOSIS — R06.02 SHORTNESS OF BREATH: ICD-10-CM

## 2024-06-06 DIAGNOSIS — G47.33 OSA (OBSTRUCTIVE SLEEP APNEA): ICD-10-CM

## 2024-06-06 DIAGNOSIS — J45.909 UNCOMPLICATED ASTHMA, UNSPECIFIED ASTHMA SEVERITY, UNSPECIFIED WHETHER PERSISTENT (HCC): ICD-10-CM

## 2024-06-06 PROCEDURE — 99204 OFFICE O/P NEW MOD 45 MIN: CPT | Performed by: INTERNAL MEDICINE

## 2024-06-06 RX ORDER — TRIAMCINOLONE ACETONIDE 55 UG/1
2 SPRAY, METERED NASAL DAILY
Qty: 1 EACH | Refills: 2 | Status: SHIPPED | OUTPATIENT
Start: 2024-06-06 | End: 2024-07-06

## 2024-06-06 RX ORDER — AZELASTINE HYDROCHLORIDE 137 UG/1
1-2 SPRAY, METERED NASAL 2 TIMES DAILY
Qty: 1 EACH | Refills: 3 | Status: SHIPPED | OUTPATIENT
Start: 2024-06-06 | End: 2024-07-06

## 2024-06-06 NOTE — PATIENT INSTRUCTIONS
Plan:    Check IgE and Respiratory RAST Allergy panel  Check Food Allergy panel    Check CBC with Differential count to evaluate Eosinophilia   Azelastine 137 mcg 1 puff twice daily as needed for congested and runny nose   Nasacort 2 puffs each nostril daily  Normal saline OTC nasal spray 2 puffs 4 times daily as needed to prevent nasal dryness   Robitussin CF 2 teaspoon 4 times daily as needed for cough   Continue current CPAP 12 cwp  as patient is using and benefiting from CPAP use  Then Obtain Download data for compliance and efficacy from CPAP machine on the next visit   Check follow up CT chest in 3 months   Advised against drowsy driving and to avoid alcoholic beverage and respiratory depressants as these may worsen sleep apnea      Follow up: 3  months     Vish Briggs MD'      Obstructive Sleep Apnea  Obstructive sleep apnea is a condition caused by air passages becoming narrowed or blocked during sleep. As a result, breathing stops for short periods. Your body wakes up enough for breathing to start again. But you don't remember it. The cycle of stopped breathing and brief awakenings can repeat dozens of times a night. This prevents the body from getting to the deeper stages of sleep that are needed for good rest.   Signs of sleep apnea include loud snoring, noisy breathing, and gasping sounds during sleep. People with sleep apnea often find they use the bathroom many times during the night. Daytime symptoms include waking up tired after a full night's sleep and waking up with headaches. They can also include feeling very sleepy or falling asleep during the day, and having problems with memory or concentration.   Risk factors for sleep apnea include:  Being overweight  Being assigned male at birth, or being in menopause  Smoking  Using alcohol or sedating medicines  Having enlarged structures in the nose or throat such as enlarged tonsils or adenoids, or extra tissue in the  airway  Home care  Lifestyle changes that can help treat snoring and sleep apnea include:   If you're overweight, talk with your healthcare provider about a weight-loss plan for you.  Don't drink alcohol for 3 to 4 hours before bedtime.  Don't take sedating medicines. Ask your healthcare provider about the medicines you take.  If you smoke, talk to your provider about ways to quit. It's important to stay away from secondhand smoke. Don't use e-cigarettes because of their harmful side effects.  Sleep on your side. This can help prevent gravity from pulling relaxed throat tissues into your breathing passages.  If you have allergies or sinus problems that block your nose, ask your provider for help.  Use positive airway pressure (PAP). Discuss with your provider the benefits of using PAP at home. And talk about the type of PAP that's best for you.  Follow-up care  Follow up with your healthcare provider, or as advised. A diagnosis of sleep apnea is made with a sleep study. Your provider can tell you more about this test.   When to get medical care  See your healthcare provider if you have daytime symptoms of sleep apnea. These include:   Waking up tired after a full night's sleep  Waking up with a headache  Feeling very sleepy or falling asleep during the day  Having problems with memory or concentration  Also talk with your provider if your partner tells you that you snore, gasp for air, or stop breathing while you sleep.   Seeing your provider is important because sleep apnea can make you more likely to have certain health problems. These include high blood pressure, heart attack, stroke, and sexual dysfunction. If you have sleep apnea, talk with your healthcare provider about the best treatments for you.   [employer] last reviewed this educational content on 5/1/2022 © 2000-2023 [employer]. All rights reserved. This information is not intended as a substitute for professional medical care. Always  follow your healthcare professional's instructions.        Continuous Positive Air Pressure (CPAP)     A mask over the nose gently directs air into the throat to keep the airway open.     Continuous positive air pressure (CPAP) uses gentle air pressure to hold the airway open. CPAP is often the most effective treatment for sleep apnea. It works very well as a treatment for adults diagnosed with obstructive sleep apnea with a lot of sleepiness. But keep in mind that it can take several adjustments before the setup is right for you.   How CPAP works  The CPAP machine  is a small portable pump that sits beside the bed. The pump sends air through a hose, which is held over your nose alone, or nose and mouth by a mask. Mild air pressure is gently pushed through your airway. The air pressure nudges sagging tissues aside. This widens the airway so you can breathe better. CPAP may be combined with other kinds of therapy for sleep apnea.   Types of air pressure treatments  There are different types of CPAP. Your doctor or CPAP technician will help you decide which type is best for you:   Basic CPAP keeps the pressure constant all night long.  A bilevel device (BiPAP) provides more pressure when you breathe in and less when you breathe out. A BiPAP machine also may be set to provide automatic breaths to maintain breathing if you stop breathing while sleeping.  An autoCPAP device automatically adjusts pressure throughout the night and in response to changes such as body position, sleep stage, and snoring.  Heather last reviewed this educational content on 7/1/2019  © 3529-1329 [employer]. All rights reserved. This information is not intended as a substitute for professional medical care. Always follow your healthcare professional's instructions.       Allergic Rhinitis  Allergic rhinitis is an allergic reaction that affects the nose, and often the eyes. It’s often known as nasal allergies. Nasal allergies are  often due to things in the environment that are breathed in. Depending what you are sensitive to, nasal allergies may occur only during certain seasons, or they may occur year round. Common indoor allergens include house dust mites, mold, cockroaches, and pet dander. Outdoor allergens include pollen from trees, grasses, and weeds.   Symptoms include a drippy, stuffy, and itchy nose. They also include sneezing and red and itchy eyes. You may feel tired more often. Severe allergies may also affect your breathing and trigger a condition called asthma.   Tests can be done to see what allergens are affecting you. You may be referred to an allergy specialist for testing and further evaluation.  Home care  Your healthcare provider may prescribe medicines to help relieve allergy symptoms. These may include oral medicines, nasal sprays, or eye drops.  Ask your provider for advice on how to stay away from substances that you are allergic to. Below are a few tips for each type of allergen.  Pet dander:  Do not have pets with fur and feathers.  If you have a pet, keep it out of your bedroom and off upholstered furniture.  Pollen:  When pollen counts are high, keep windows of your car and home closed. If possible, use an air conditioner instead.  Wear a filter mask when mowing or doing yard work.  House dust mites:  Wash bedding every week in warm water and detergent and dry on a hot setting.  Cover the mattress, box spring, and pillows with allergy covers.   If possible, sleep in a room with no carpet, curtains, or upholstered furniture.  Cockroaches:  Store food in sealed containers.  Remove garbage from the home promptly.  Fix water leaks.  Mold:  Keep humidity low by using a dehumidifier or air conditioner. Keep the dehumidifier and air conditioner clean and free of mold.  Clean moldy areas with bleach and water. Don't mix bleach with other .  In general:  Vacuum once or twice a week. If possible, use a vacuum with a  high-efficiency particulate air (HEPA) filter.  Don't smoke. Stay away from cigarette smoke. Cigarette smoke is an irritant that can make symptoms worse.  Follow-up care  Follow up as advised by the healthcare provider or our staff. If you were referred to an allergy specialist, make this appointment promptly.  When to seek medical advice  Call your healthcare provider or get medical care right away if the following occur:  Coughing  Fever of 100.4°F (38°C) or higher, or as directed by your healthcare provider  Raised red bumps (hives)  Continuing symptoms, new symptoms, or worsening symptoms  Call 911  Call 911 if you have:  Trouble breathing  Severe swelling of the face or severe itching of the eyes or mouth  Wheezing or shortness of breath  Chest tightness  Dizziness or lightheadedness  Feeling of doom  Stomach pain, bloating, vomiting, or diarrhea  Heather last reviewed this educational content on 10/1/2019  © 3157-8328 [employer]. All rights reserved. This information is not intended as a substitute for professional medical care. Always follow your healthcare professional's instructions.

## 2024-06-06 NOTE — PROGRESS NOTES
Pulmonary/Critical Care/Sleep Medicine    Consult Note     PCP: Rose Ngo DO   Phone: [phone]   Fax: [phone]        Chief Complaint   Patient presents with    Consult     Lung Nodules  Chronic Cough       HPI  I had the pleasure of seeing Kirsten Guajardo who is a pleasant 68 year old female who presents for evaluation of pulmonary nodules and STAR     The patient states that she has been having cough symptoms for 20  years and CT chest done  3 years ago showed pulmonary nodules that were small but waxing and waning in size . The most recent CT showed growth in pulmonary nodule, so pulmonary consult was obtained     On today's visit the She admits nasal congestion, cough, sputum production, but denies chest pain, wheezing, shortness of breath, hemoptysis, fever, chills, nausea, vomiting, abdominal pains, focal weakness, weight loss or night sweats.    The patient states that she has snored at home in past and was noted to have witnessed apnea, she has been diagnosed with STAR and uses CPAP. She denies being  sleepy or fatigued during the daytime.       The patient denies kicking legs at night.      The patient reports using CPAP daily at night at 12 cmH2O regularly throughout the night.  The patient wishes to transfer her sleep medicine care at [facility] as well.    She drinks no caffeine coffee daily       She has pets 2 dogs  that do not sleep in bed.  Her dogs are vaccinated.       Hx of tobacco use: She  reports that she quit smoking about 36 years ago. Her smoking use included cigarettes. She has a 30 pack-year smoking history. She has never used smokeless tobacco.    Past Medical History:    Abdominal distention    Abdominal pain    ALCOHOL USE    Allergic rhinitis    Not sure    Anesthesia complication    Atherosclerosis of coronary artery    Belching    Bloating    Chest pain    Constipation    Diarrhea, unspecified    Essential hypertension    Flatulence/gas pain/belching     Heart palpitations    High cholesterol    History of cardiac murmur    HLD (hyperlipidemia)    HTN (hypertension)    Human papilloma virus infection    Hyperlipidemia    Indigestion    Irregular bowel habits    Nausea    Night sweats    Paroxysmal SVT (supraventricular tachycardia) (HCC)    PONV (postoperative nausea and vomiting)    Sleep apnea    uses CPAP    Visual impairment    contacts and glasses    Wears glasses      Past Surgical History:   Procedure Laterality Date    Colonoscopy  03/09/2009    Colonoscopy      Egd      Eye surgery Right     eyelid    Other      LEX and right hip injection    Other surgical history  Coccyx removed    Removal of coccyx      [place] teeth removed       Allergies   Allergen Reactions    Atenolol HIVES    Beta Adrenergic Blockers HIVES     Atenolo, Toprol.    Duloxetine OTHER (SEE COMMENTS)    Penicillins HIVES    Sulfa Antibiotics SWELLING     Other reaction(s): Other (See Comments)  Severe joint pain  Other reaction(s): Swelling    Duloxetine Hcl PALPITATIONS     Other reaction(s): Other (See Comments)    Lisinopril Coughing    Simvastatin      Other reaction(s): Altered Mental Status     Current Outpatient Medications   Medication Sig Dispense Refill    OMEPRAZOLE 20 MG Oral Capsule Delayed Release TAKE 1 CAPSULE BY MOUTH TWICE  DAILY IN THE MORNING AND BEFORE  BEDTIME 180 capsule 3    dilTIAZem HCl ER Coated Beads 180 MG Oral Capsule SR 24 Hr Take 1 capsule (180 mg total) by mouth 2 (two) times daily. 180 capsule 3    citalopram 20 MG Oral Tab Take 1 tablet (20 mg total) by mouth daily. 90 tablet 3    losartan 100 MG Oral Tab Take 1 tablet (100 mg total) by mouth daily. 90 tablet 1    rosuvastatin 5 MG Oral Tab Take 1 tablet (5 mg total) by mouth nightly. 90 tablet 3    pantoprazole 40 MG Oral Tab EC Take 1 tablet (40 mg total) by mouth every morning before breakfast. 30 tablet 1    aspirin 81 MG Oral Tab EC Take 1 tablet (81 mg total) by mouth daily.      Calcium  Carb-Cholecalciferol (CALCIUM + D3) 600-200 MG-UNIT Oral Tab Take 1 tablet by mouth daily.        multivitamin Oral Tab Take 1 tablet by mouth daily.      dilTIAZem HCl ER Beads (TIADYLT ER) 180 MG Oral Capsule SR 24 Hr Take 1 capsule (180 mg total) by mouth 2 (two) times daily. (Patient not taking: Reported on 2024) 180 capsule 3      Social History     Socioeconomic History    Marital status:    Occupational History    Occupation: RN     Comment: retired   Tobacco Use    Smoking status: Former     Current packs/day: 0.00     Average packs/day: 2.0 packs/day for 15.0 years (30.0 ttl pk-yrs)     Types: Cigarettes     Quit date: 1987     Years since quittin.7    Smokeless tobacco: Never   Vaping Use    Vaping status: Never Used   Substance and Sexual Activity    Alcohol use: Yes     Alcohol/week: 1.0 standard drink of alcohol     Types: 1 Glasses of wine per week     Comment: soc    Drug use: Never    Sexual activity: Yes     Partners: Male   Other Topics Concern    Caffeine Concern No    Exercise Yes     Comment: yoga/swimming twice weekly    Seat Belt Yes    Self-Exams No     Comment: Every 3 months      Immunization History   Administered Date(s) Administered    Covid-19 Vaccine Moderna 100 mcg/0.5 ml 2021, 2021, 10/22/2021    Covid-19 Vaccine Moderna 50 Mcg/0.25 Ml 2022    Covid-19 Vaccine Moderna Bivalent 50mcg/0.5mL 12+ years 2022, 2023    FLU VAC High Dose 65 YRS & Older PRSV Free (92289) 10/15/2020, 2021, 10/11/2022    FLULAVAL 6 months & older 0.5 ml Prefilled syringe (06611) 10/26/2017    FLUZONE 6 months and older PFS 0.5 ml ([id number]) 10/13/2014, 10/19/2015, 10/04/2016, 10/26/2017, 10/03/2018, 2019    Fluarix 6 Months And Older 0.5 ml prefilled syringe (09400) 2019    Fluvirin, 3 Years & >, Im 09/15/2010, 2012, 10/04/2013    Fluzone Vaccine Medicare () 10/15/2020    HIGH DOSE FLU 65 YRS AND OLDER PRSV FREE SINGLE D (54458) FLU  CLINIC 09/22/2021, 10/11/2022, 09/19/2023    Hpv Virus Vaccine 9 Elaine Im 06/27/2022, 08/29/2022, 12/30/2022    Influenza 10/13/2014, 10/19/2015, 10/04/2016    Influenza(Afluria)0.5ml QIV PFS 10/03/2018    Moderna Covid-19 Vaccine 50mcg/0.5ml 12yrs+ ([id number]) 09/19/2023    Pneumococcal Conjugate PCV20 04/25/2022    Pneumovax 23 11/30/2020    TDAP 06/02/2015    Zoster Vaccine Live (Zostavax) 11/01/2016    Zoster Vaccine Recombinant Adjuvanted (Shingrix) 01/20/2020, 05/31/2020, 06/01/2020      Family History   Problem Relation Age of Onset    Heart Disease Mother     Hypertension Mother     Heart Attack Mother     Stroke Mother     Other (rheumatoid arthritis) Mother     Hypertension Father     Heart Attack Father 75    Heart Disorder Father     No Known Problems Sister         Review of Systems   Constitutional:  Negative for fatigue, fever and unexpected weight change.   HENT:  Positive for congestion, postnasal drip and rhinorrhea. Negative for mouth sores, nosebleeds, sore throat and trouble swallowing.    Eyes:  Negative for visual disturbance.   Respiratory:  Positive for cough. Negative for apnea, choking, chest tightness, shortness of breath and wheezing.    Cardiovascular:  Negative for chest pain, palpitations and leg swelling.   Gastrointestinal:  Negative for abdominal pain, constipation, diarrhea, nausea and vomiting.   Genitourinary:  Negative for difficulty urinating.   Musculoskeletal:  Negative for arthralgias, back pain, gait problem and myalgias.   Neurological:  Negative for dizziness, weakness and headaches.   Psychiatric/Behavioral:  Negative for sleep disturbance.         Vitals:    06/06/24 1436   BP: 138/86   Pulse: 91   Resp: 16   Temp: 97.7 °F (36.5 °C)      SpO2: 97 %  Ht Readings from Last 1 Encounters:   06/06/24 5' 8\" (1.727 m)     Wt Readings from Last 1 Encounters:   06/06/24 160 lb (72.6 kg)     Body mass index is 24.33 kg/m².     Physical Exam  Constitutional:       General: She is  not in acute distress.     Appearance: Normal appearance. She is not ill-appearing or diaphoretic.   HENT:      Head: Normocephalic and atraumatic.      Nose: Nose normal. No congestion or rhinorrhea.      Mouth/Throat:      Mouth: Mucous membranes are moist.      Pharynx: Oropharynx is clear. No oropharyngeal exudate or posterior oropharyngeal erythema.      Comments: Small/crowded oropharynx with Mallampati class I palate  Eyes:      Extraocular Movements: Extraocular movements intact.      Pupils: Pupils are equal, round, and reactive to light.   Cardiovascular:      Rate and Rhythm: Normal rate.      Pulses: Normal pulses.      Heart sounds: Normal heart sounds. No murmur heard.  Pulmonary:      Effort: Pulmonary effort is normal. No respiratory distress.      Breath sounds: Normal breath sounds. No wheezing or rhonchi.   Chest:      Chest wall: No tenderness.   Abdominal:      General: Abdomen is flat. Bowel sounds are normal.      Palpations: Abdomen is soft.   Musculoskeletal:         General: Normal range of motion.   Skin:     General: Skin is warm.   Neurological:      General: No focal deficit present.      Mental Status: She is alert and oriented to person, place, and time.   Psychiatric:         Mood and Affect: Mood normal.         Behavior: Behavior normal.         Thought Content: Thought content normal.         Judgment: Judgment normal.             Labs:  Last BMP  Lab Results   Component Value Date     (H) 11/27/2023    BUN 16 11/27/2023    CREATSERUM 0.91 11/27/2023    BUNCREA 12.5 10/06/2020    ANIONGAP 3 11/27/2023    GFRAA 71 06/06/2022    GFRNAA 62 06/06/2022    CA 8.8 11/27/2023     11/27/2023    K 4.3 11/27/2023     11/27/2023    CO2 25.0 11/27/2023    OSMOCALC 285 11/27/2023      Last CBC  Lab Results   Component Value Date    WBC 6.8 11/27/2023    RBC 4.00 11/27/2023    HGB 12.7 11/27/2023    HCT 39.3 11/27/2023    MCV 98.3 11/27/2023    MCH 31.8 11/27/2023    MCHC 32.3  11/27/2023    RDW 13.0 11/27/2023    .0 11/27/2023      Last CMP  Lab Results   Component Value Date     (H) 11/27/2023    BUN 16 11/27/2023    BUNCREA 12.5 10/06/2020    CREATSERUM 0.91 11/27/2023    ANIONGAP 3 11/27/2023    GFR 77 11/15/2017    GFRNAA 62 06/06/2022    GFRAA 71 06/06/2022    CA 8.8 11/27/2023    OSMOCALC 285 11/27/2023    ALKPHO 88 11/27/2023    AST 17 11/27/2023    ALT 23 11/27/2023    BILT 0.7 11/27/2023    TP 7.4 11/27/2023    ALB 3.7 11/27/2023    GLOBULIN 3.7 11/27/2023     11/27/2023    K 4.3 11/27/2023     11/27/2023    CO2 25.0 11/27/2023      Last Thyroid Function  Lab Results   Component Value Date    TSH 2.860 11/27/2023        Imaging:     PROCEDURE:  CT CHEST (CPT=71250) personally reviewed     COMPARISON:  [facility] , CT, CT CHEST (CPT=71250), 5/06/2022, 7:14 AM.  [facility] , CT, CT CHEST (CPT=71250), 5/10/2023, 8:36 AM.     INDICATIONS:  R91.1 Incidental lung nodule, greater than or equal to 8mm     TECHNIQUE:  Unenhanced multislice CT scanning is performed through the chest.  Dose reduction techniques were used. Dose information is transmitted to the ACR (American College of Radiology) NRDR (National Radiology Data Registry) which includes the Dose   Index Registry.     PATIENT STATED HISTORY: (As transcribed by Technologist)  Nodule         FINDINGS:    LUNGS:  No focal consolidation.  Biapical pleural parenchymal scarring.  Re-identified are several nodules bilaterally.  These include largest nodules within the right lower lobe which measures 8 x 4 versus 9 x 4 mm, image 114 of series 3 and left lower  lobe 8 x 7 versus 6 x 6 mm nodule, image 100 of series 3  VASCULATURE:  Pulmonary vessels are unremarkable within the limits of a noncontrast CT.    MARY:  No mass or adenopathy.    MEDIASTINUM:  No mass or adenopathy.    CARDIAC:  No enlargement, pericardial thickening, or significant coronary artery calcification.  Moderate coronary artery  calcifications.  PLEURA:  No mass or effusion.    THORACIC AORTA:  Mild atherosclerosis.  CHEST WALL:  No mass or axillary adenopathy.    LIMITED ABDOMEN:  Limited images of the upper abdomen demonstrate a partially imaged punctate nonobstructing left renal calcification.    BONES:  Stable including mild degenerative change.  No fracture.                     Impression   CONCLUSION:    1. When compared to most recent noncontrast CT of the chest performed 5/10/2023, there is slight interval increase in size of left lower lobe nodule.  PET imaging can be performed for further evaluation as clinically indicated.     LOCATION:  [facility]        Dictated by (CST): Sarah Cifuentes MD on 5/29/2024 at 6:17 PM         Impression:    Bilateral pulmonary nodules waxing and waning in an asymptomatic patient but with history of smoking 30 pack years though stopped smoking in 1987: CT chest on 5/29/2024 shows Right lower lobe which measures 8 x 4 versus 9 x 4 mm, image 114 of series 3 and left lower lobe 8 x 7 versus 6 x 6 mm nodule, image 100 of series: Differential diagnosis would include CT chest variation, vs fungal granulomas versus atypical mycobacterial infection versus granulomatosis with polyangiitis or microscopic polyangiitis versus collagen vascular disease like rheumatoid arthritis versus sarcoid versus cryptogenic organizing pneumonia versus inflammatory bowel disease versus neoplastic process etc.  Obstructive sleep apnea syndrome (OSAS) Treated with CPAP 12  cwp.   Daytime hypersomnolence/fatigue  History of 30-pack-year tobacco smoking though stopped in 1987  Body mass index is 24.33 kg/m².  Allergic Rhinitis: With recurrent symptoms  Hypertension   Hyperlipidemia  PSVT                                Plan:    Check IgE and Respiratory RAST Allergy panel  Check Food Allergy panel    Check CBC with Differential count to evaluate Eosinophilia   Azelastine 137 mcg 1 puff twice daily as needed for congested and runny nose    Nasacort 2 puffs each nostril daily  Normal saline OTC nasal spray 2 puffs 4 times daily as needed to prevent nasal dryness   Robitussin CF 2 teaspoon 4 times daily as needed for cough   Continue current CPAP 12 cwp  as patient is using and benefiting from CPAP use  Then Obtain Download data for compliance and efficacy from CPAP machine on the next visit   Check follow up CT chest in 3 months   Since there is only minimal change in size of pulmonary nodule,Will perform further workup if there is further change in size of nodule on follow up CT chest .   Advised against drowsy driving and to avoid alcoholic beverage and respiratory depressants as these may worsen sleep apnea  Lung Cancer Counseling and Shared Decision Making Session in an Asymptomatic Smoker/Former Smoker   Kirsten Guajardo is a 68 year old female without current symptoms of lung cancer.  History   Smoking Status    Former    Types: Cigarettes   Smokeless Tobacco    Never     She received information on the importance of adherence to annual lung cancer Low Dose CT (LDCT) screening, the impact of her comorbidities and her ability or willingness to undergo diagnosis and treatment. she is in agreement and an order will be placed for CT LUNG LD SCREENING(CPT=71271).  We counseled the importance of maintaining cigarette smoking abstinence if she is a former smoker and the importance of smoking cessation if she is a current smoker and we discussed and furnished information about tobacco cessation interventions.       Follow up: 3  months     Thank you for allowing me to participate in your patient care.    AMIE Briggs MD, FACP, FCCP, FAA - Pulmonary/Critical care/Sleep Medicine  Please contact our office if you have any questions or concerns at [phone]    Note to the patient: The 21st Century Cures Act makes medical notes like these available to patients in the interest of transparency. However, be advised that this is a medical document. It is  intended as peer to peer communication. It is written in medical language and may contain abbreviations or verbiage that are unfamiliar. It may appear blunt or direct. Medical documents are intended to carry relevant information, facts as evident, and clinical opinion of the practitioner.      Disclaimer: Components of this note were documented using voice recognition system and are subject to errors not corrected at proofreading. Contact the author of this note for any clarifications

## 2024-06-11 ENCOUNTER — LAB ENCOUNTER (OUTPATIENT)
Dept: LAB | Facility: HOSPITAL | Age: 69
End: 2024-06-11
Attending: INTERNAL MEDICINE
Payer: MEDICARE

## 2024-06-11 DIAGNOSIS — J30.9 ALLERGIC RHINITIS, UNSPECIFIED SEASONALITY, UNSPECIFIED TRIGGER: ICD-10-CM

## 2024-06-11 DIAGNOSIS — J45.909 UNCOMPLICATED ASTHMA, UNSPECIFIED ASTHMA SEVERITY, UNSPECIFIED WHETHER PERSISTENT (HCC): ICD-10-CM

## 2024-06-11 DIAGNOSIS — J45.20 INTERMITTENT ASTHMA WITHOUT COMPLICATION, UNSPECIFIED ASTHMA SEVERITY (HCC): ICD-10-CM

## 2024-06-11 DIAGNOSIS — R06.02 SHORTNESS OF BREATH: ICD-10-CM

## 2024-06-11 LAB
BASOPHILS # BLD AUTO: 0.05 X10(3) UL (ref 0–0.2)
BASOPHILS NFR BLD AUTO: 0.7 %
EOSINOPHIL # BLD AUTO: 0.13 X10(3) UL (ref 0–0.7)
EOSINOPHIL NFR BLD AUTO: 1.7 %
ERYTHROCYTE [DISTWIDTH] IN BLOOD BY AUTOMATED COUNT: 12.8 %
HCT VFR BLD AUTO: 37.3 %
HGB BLD-MCNC: 12.7 G/DL
IMM GRANULOCYTES # BLD AUTO: 0.04 X10(3) UL (ref 0–1)
IMM GRANULOCYTES NFR BLD: 0.5 %
LYMPHOCYTES # BLD AUTO: 2.02 X10(3) UL (ref 1–4)
LYMPHOCYTES NFR BLD AUTO: 26.5 %
MCH RBC QN AUTO: 33 PG (ref 26–34)
MCHC RBC AUTO-ENTMCNC: 34 G/DL (ref 31–37)
MCV RBC AUTO: 96.9 FL
MONOCYTES # BLD AUTO: 0.51 X10(3) UL (ref 0.1–1)
MONOCYTES NFR BLD AUTO: 6.7 %
NEUTROPHILS # BLD AUTO: 4.88 X10 (3) UL (ref 1.5–7.7)
NEUTROPHILS # BLD AUTO: 4.88 X10(3) UL (ref 1.5–7.7)
NEUTROPHILS NFR BLD AUTO: 63.9 %
PLATELET # BLD AUTO: 323 10(3)UL (ref 150–450)
RBC # BLD AUTO: 3.85 X10(6)UL
WBC # BLD AUTO: 7.6 X10(3) UL (ref 4–11)

## 2024-06-11 PROCEDURE — 86003 ALLG SPEC IGE CRUDE XTRC EA: CPT

## 2024-06-11 PROCEDURE — 82785 ASSAY OF IGE: CPT

## 2024-06-11 PROCEDURE — 36415 COLL VENOUS BLD VENIPUNCTURE: CPT

## 2024-06-11 PROCEDURE — 85025 COMPLETE CBC W/AUTO DIFF WBC: CPT

## 2024-06-12 LAB
A ALTERNATA IGE QN: <0.1 KUA/L (ref ?–0.1)
A FUMIGATUS IGE QN: <0.1 KUA/L (ref ?–0.1)
ALLERGEN BRAZIL NUT: <0.1 KUA/L (ref ?–0.1)
ALMOND IGE QN: 0.56 KUA/L (ref ?–0.1)
AMER SYCAMORE IGE QN: <0.1 KUA/L (ref ?–0.1)
BERMUDA GRASS IGE QN: <0.1 KUA/L (ref ?–0.1)
BOXELDER IGE QN: <0.1 KUA/L (ref ?–0.1)
C HERBARUM IGE QN: <0.1 KUA/L (ref ?–0.1)
CALIF WALNUT IGE QN: <0.1 KUA/L (ref ?–0.1)
CASHEW NUT IGE QN: <0.1 KUA/L (ref ?–0.1)
CAT DANDER IGE QN: <0.1 KUA/L (ref ?–0.1)
CLAM IGE QN: <0.1 KUA/L (ref ?–0.1)
CMN PIGWEED IGE QN: <0.1 KUA/L (ref ?–0.1)
CODFISH IGE QN: <0.1 KUA/L (ref ?–0.1)
COMMON RAGWEED IGE QN: <0.1 KUA/L (ref ?–0.1)
CORN IGE QN: 0.23 KUA/L (ref ?–0.1)
COTTONWOOD IGE QN: <0.1 KUA/L (ref ?–0.1)
COW MILK IGE QN: <0.1 KUA/L (ref ?–0.1)
D FARINAE IGE QN: <0.1 KUA/L (ref ?–0.1)
D PTERONYSS IGE QN: <0.1 KUA/L (ref ?–0.1)
DOG DANDER IGE QN: <0.1 KUA/L (ref ?–0.1)
EGG WHITE IGE QN: <0.1 KUA/L (ref ?–0.1)
HAZELNUT IGE QN: 0.2 KUA/L (ref ?–0.1)
IGE SERPL-ACNC: 6.56 KU/L (ref 2–214)
IGE SERPL-ACNC: 7.01 KU/L (ref 2–214)
M RACEMOSUS IGE QN: <0.1 KUA/L (ref ?–0.1)
MARSH ELDER IGE QN: <0.1 KUA/L (ref ?–0.1)
MOUSE EPITH IGE QN: <0.1 KUA/L (ref ?–0.1)
MT JUNIPER IGE QN: <0.1 KUA/L (ref ?–0.1)
P NOTATUM IGE QN: <0.1 KUA/L (ref ?–0.1)
PEANUT IGE QN: 0.55 KUA/L (ref ?–0.1)
PECAN/HICK TREE IGE QN: <0.1 KUA/L (ref ?–0.1)
ROACH IGE QN: <0.1 KUA/L (ref ?–0.1)
SALMON IGE QN: <0.1 KUA/L (ref ?–0.1)
SALTWORT IGE QN: <0.1 KUA/L (ref ?–0.1)
SCALLOP IGE QN: <0.1 KUA/L (ref ?–0.1)
SESAME SEED IGE QN: <0.1 KUA/L (ref ?–0.1)
SHRIMP IGE QN: <0.1 KUA/L (ref ?–0.1)
SILVER BIRCH IGE QN: <0.1 KUA/L (ref ?–0.1)
SOYBEAN IGE QN: <0.1 KUA/L (ref ?–0.1)
TIMOTHY IGE QN: <0.1 KUA/L (ref ?–0.1)
WALNUT IGE QN: 0.24 KUA/L (ref ?–0.1)
WHEAT IGE QN: <0.1 KUA/L (ref ?–0.1)
WHITE ASH IGE QN: <0.1 KUA/L (ref ?–0.1)
WHITE ELM IGE QN: <0.1 KUA/L (ref ?–0.1)
WHITE MULBERRY IGE QN: <0.1 KUA/L (ref ?–0.1)
WHITE OAK IGE QN: <0.1 KUA/L (ref ?–0.1)

## 2024-08-08 ENCOUNTER — HOSPITAL ENCOUNTER (OUTPATIENT)
Dept: ULTRASOUND IMAGING | Facility: HOSPITAL | Age: 69
Discharge: HOME | End: 2024-08-08
Attending: SURGERY
Payer: MEDICARE

## 2024-08-08 DIAGNOSIS — N28.1 RENAL CYST: ICD-10-CM

## 2024-08-08 PROCEDURE — 76770 US EXAM ABDO BACK WALL COMP: CPT | Performed by: SURGERY

## 2024-08-09 NOTE — PROGRESS NOTES
Renal ultrasound 8/8/2024 shows no hydronephrosis bilaterally, 2.3 cm left renal cyst (stable from prior), possible 4 mm left renal stone.    Will schedule appointment to review.

## 2024-08-13 ENCOUNTER — OFFICE VISIT (OUTPATIENT)
Dept: SURGERY | Facility: CLINIC | Age: 69
End: 2024-08-13
Payer: MEDICARE

## 2024-08-13 DIAGNOSIS — N28.1 COMPLEX RENAL CYST: Primary | ICD-10-CM

## 2024-08-13 DIAGNOSIS — N20.0 NEPHROLITHIASIS: ICD-10-CM

## 2024-08-13 LAB
APPEARANCE: CLEAR
BILIRUBIN: NEGATIVE
GLUCOSE (URINE DIPSTICK): NEGATIVE MG/DL
KETONES (URINE DIPSTICK): NEGATIVE MG/DL
LEUKOCYTES: NEGATIVE
MULTISTIX LOT#: ABNORMAL NUMERIC
NITRITE, URINE: NEGATIVE
PH, URINE: 7 (ref 4.5–8)
PROTEIN (URINE DIPSTICK): NEGATIVE MG/DL
SPECIFIC GRAVITY: 1.01 (ref 1–1.03)
URINE-COLOR: YELLOW
UROBILINOGEN,SEMI-QN: 0.2 MG/DL (ref 0–1.9)

## 2024-08-13 PROCEDURE — 99213 OFFICE O/P EST LOW 20 MIN: CPT | Performed by: SURGERY

## 2024-08-13 PROCEDURE — 81003 URINALYSIS AUTO W/O SCOPE: CPT | Performed by: SURGERY

## 2024-08-13 NOTE — PROGRESS NOTES
Urology Clinic Note    Primary Care Provider:  Rose Ngo DO     Chief Complaint:   Renal cyst     HPI:   Kirsten Guajardo is a 68 year old female with history of hypertension, hyperlipidemia, STAR referred for finding on a recent abdominal MRI 3/21/2023 of a 2.4 cm exophytic left lower pole minimally complex renal cyst with likely hemorrhagic or proteinaceous content.     She initially got the MRI given epigastric abdominal pain and left lower quadrant pain.  She denies left-sided flank pain or gross hematuria.  I reviewed her MRI imaging.  There is no prior imaging to compare to.  Initial visit with me on 4/11/2023 we discussed surveillance with repeat imaging in 6 months.     CT scan from 10/11/2023 showed the same left renal cyst to be stable in size (still 2.4 cm) and likely consistent with a hemorrhagic cyst still.       Renal ultrasound 8/8/2024 shows no hydronephrosis bilaterally, 2.3 cm left renal cyst (stable from prior), possible 4 mm left renal stone.     She is doing well today.  She has no urologic complaints.    History:     Past Medical History:    Abdominal distention    Abdominal pain    ALCOHOL USE    Allergic rhinitis    Not sure    Anesthesia complication    Atherosclerosis of coronary artery    Belching    Bloating    Chest pain    Constipation    Diarrhea, unspecified    Essential hypertension    Flatulence/gas pain/belching    Heart palpitations    High cholesterol    History of cardiac murmur    HLD (hyperlipidemia)    HTN (hypertension)    Human papilloma virus infection    Hyperlipidemia    Indigestion    Irregular bowel habits    Nausea    Night sweats    Paroxysmal SVT (supraventricular tachycardia) (HCC)    PONV (postoperative nausea and vomiting)    Sleep apnea    uses CPAP    Visual impairment    contacts and glasses    Wears glasses       Past Surgical History:   Procedure Laterality Date    Colonoscopy  03/09/2009    Colonoscopy      Egd      Eye surgery Right     eyelid     Other      LEX and right hip injection    Other surgical history  Coccyx removed    Removal of coccyx      [place] teeth removed         Family History   Problem Relation Age of Onset    Heart Disease Mother     Hypertension Mother     Heart Attack Mother     Stroke Mother     Other (rheumatoid arthritis) Mother     Hypertension Father     Heart Attack Father 75    Heart Disorder Father     No Known Problems Sister        Social History     Socioeconomic History    Marital status:    Occupational History    Occupation: RN     Comment: retired   Tobacco Use    Smoking status: Former     Current packs/day: 0.00     Average packs/day: 2.0 packs/day for 15.0 years (30.0 ttl pk-yrs)     Types: Cigarettes     Quit date: 1987     Years since quittin.9    Smokeless tobacco: Never   Vaping Use    Vaping status: Never Used   Substance and Sexual Activity    Alcohol use: Yes     Alcohol/week: 1.0 standard drink of alcohol     Types: 1 Glasses of wine per week     Comment: soc    Drug use: Never    Sexual activity: Yes     Partners: Male   Other Topics Concern    Caffeine Concern No    Exercise Yes     Comment: yoga/swimming twice weekly    Seat Belt Yes    Self-Exams No     Comment: Every 3 months       Medications (Active prior to today's visit):  Current Outpatient Medications   Medication Sig Dispense Refill    OMEPRAZOLE 20 MG Oral Capsule Delayed Release TAKE 1 CAPSULE BY MOUTH TWICE  DAILY IN THE MORNING AND BEFORE  BEDTIME 180 capsule 3    dilTIAZem HCl ER Beads (TIADYLT ER) 180 MG Oral Capsule SR 24 Hr Take 1 capsule (180 mg total) by mouth 2 (two) times daily. (Patient not taking: Reported on 2024) 180 capsule 3    dilTIAZem HCl ER Coated Beads 180 MG Oral Capsule SR 24 Hr Take 1 capsule (180 mg total) by mouth 2 (two) times daily. 180 capsule 3    citalopram 20 MG Oral Tab Take 1 tablet (20 mg total) by mouth daily. 90 tablet 3    losartan 100 MG Oral Tab Take 1 tablet (100 mg total) by mouth  daily. 90 tablet 1    rosuvastatin 5 MG Oral Tab Take 1 tablet (5 mg total) by mouth nightly. 90 tablet 3    pantoprazole 40 MG Oral Tab EC Take 1 tablet (40 mg total) by mouth every morning before breakfast. 30 tablet 1    aspirin 81 MG Oral Tab EC Take 1 tablet (81 mg total) by mouth daily.      Calcium Carb-Cholecalciferol (CALCIUM + D3) 600-200 MG-UNIT Oral Tab Take 1 tablet by mouth daily.        multivitamin Oral Tab Take 1 tablet by mouth daily.         Allergies:  Allergies   Allergen Reactions    Atenolol HIVES    Beta Adrenergic Blockers HIVES     Atenolo, Toprol.    Duloxetine OTHER (SEE COMMENTS)    Duloxetine Hcl PALPITATIONS and OTHER (SEE COMMENTS)     Other reaction(s): Other (See Comments)    Penicillins HIVES and RASH    Sulfa Antibiotics SWELLING and OTHER (SEE COMMENTS)     Other reaction(s): Other (See Comments)    Severe joint pain    Other reaction(s): Swelling    Simvastatin CONFUSION     Other reaction(s): Altered Mental Status    Lisinopril Coughing       Review of Systems:   A comprehensive 10-point review of systems was completed.  Pertinent positives and negatives are noted in the the HPI.    Physical Exam:   CONSTITUTIONAL: Well developed, well nourished, in no acute distress  NEUROLOGIC: Alert and oriented  HEAD: Normocephalic, atraumatic  EYES: Sclera non-icteric  ENT: Hearing intact, moist mucous membranes  NECK: No obvious goiter or masses  RESPIRATORY: Normal respiratory effort  SKIN: No evident rashes  ABDOMEN: Soft, non-tender, non-distended    Assessment & Plan:   Kirsten Guajardo is a 68 year old female with history of hypertension, hyperlipidemia, STAR referred for finding on a recent abdominal MRI 3/21/2023 of a 2.4 cm exophytic left lower pole minimally complex renal cyst with likely hemorrhagic or proteinaceous content.     She initially got the MRI given epigastric abdominal pain and left lower quadrant pain.  She denies left-sided flank pain or gross hematuria.  I  reviewed her MRI imaging.  There is no prior imaging to compare to.  Initial visit with me on 4/11/2023 we discussed surveillance with repeat imaging in 6 months.     CT scan from 10/11/2023 showed the same left renal cyst to be stable in size (still 2.4 cm) and likely consistent with a hemorrhagic cyst still.       Renal ultrasound 8/8/2024 shows no hydronephrosis bilaterally, 2.3 cm left renal cyst (stable from prior), possible 4 mm left renal stone.     She is doing well today.  She has no urologic complaints.    -Repeat renal ultrasound in 1 year    In total, 20 minutes were spent on this patient encounter (including chart review, patient history, physical, and counseling, documentation, and communication).    Frankie Henley MD  Staff Urologist  [facility]  Office: [phone]

## 2024-08-22 ENCOUNTER — HOSPITAL ENCOUNTER (OUTPATIENT)
Dept: CT IMAGING | Facility: HOSPITAL | Age: 69
Discharge: HOME | End: 2024-08-22
Attending: INTERNAL MEDICINE
Payer: MEDICARE

## 2024-08-22 DIAGNOSIS — G47.33 OSA (OBSTRUCTIVE SLEEP APNEA): ICD-10-CM

## 2024-08-22 LAB
CREAT BLD-MCNC: 0.9 MG/DL
EGFRCR SERPLBLD CKD-EPI 2021: 70 ML/MIN/1.73M2 (ref 60–?)

## 2024-08-22 PROCEDURE — 71260 CT THORAX DX C+: CPT | Performed by: INTERNAL MEDICINE

## 2024-08-22 PROCEDURE — 82565 ASSAY OF CREATININE: CPT

## 2024-08-30 NOTE — PROGRESS NOTES
ANGELA MEJIA  2024 - 2024  Patient ID: [id number]  : 10/13/1955  Age: 68 years  [facility]  Compliance Report  Usage 2024 - 2024  Usage days 87/90 days (97%)  >= 4 hours 87 days (97%)  < 4 hours 0 days (0%)  Usage hours 729 hours 1 minutes  Average usage (total days) 8 hours 6 minutes  Average usage (days used) 8 hours 23 minutes  Median usage (days used) 8 hours 31 minutes  Total used hours (value since last reset - 2024) 11,155 hours  AirSense 10 AutoSet  Serial number [id number]  Mode AutoSet  Min Pressure 7 cmH2O  Max Pressure 13 cmH2O  EPR Fulltime  EPR level 3  Response Standard  Therapy  Pressure - cmH2O Median: 7.7 95th percentile: 8.9 Maximum: 9.7  Leaks - L/min Median: 0.3 95th percentile: 8.1 Maximum: 86.7  Events per hour AI: 0.8 HI: 0.1 AHI: 0.9  Apnea Index Central: 0.2 Obstructive: 0.4 Unknown: 0.2  RERA Index 0.0  Cheyne-Cervantes respiration (average duration per night) 0 minutes (0%)

## 2024-09-03 ENCOUNTER — OFFICE VISIT (OUTPATIENT)
Facility: CLINIC | Age: 69
End: 2024-09-03
Payer: MEDICARE

## 2024-09-03 VITALS
OXYGEN SATURATION: 99 % | DIASTOLIC BLOOD PRESSURE: 60 MMHG | TEMPERATURE: 98 F | SYSTOLIC BLOOD PRESSURE: 102 MMHG | WEIGHT: 160 LBS | HEART RATE: 78 BPM | RESPIRATION RATE: 18 BRPM | BODY MASS INDEX: 24.25 KG/M2 | HEIGHT: 68 IN

## 2024-09-03 DIAGNOSIS — K21.9 GASTROESOPHAGEAL REFLUX DISEASE, UNSPECIFIED WHETHER ESOPHAGITIS PRESENT: Primary | ICD-10-CM

## 2024-09-03 DIAGNOSIS — G47.33 OSA (OBSTRUCTIVE SLEEP APNEA): ICD-10-CM

## 2024-09-03 DIAGNOSIS — J30.9 ALLERGIC RHINITIS, UNSPECIFIED SEASONALITY, UNSPECIFIED TRIGGER: ICD-10-CM

## 2024-09-03 DIAGNOSIS — R91.8 PULMONARY NODULES: ICD-10-CM

## 2024-09-03 PROCEDURE — 99215 OFFICE O/P EST HI 40 MIN: CPT | Performed by: INTERNAL MEDICINE

## 2024-09-03 NOTE — PATIENT INSTRUCTIONS
Plan:    Schedule PET scan per radiology recommendation to evaluate left lower lobe nodules   Continue Azelastine 137 mcg 1 puff twice daily as needed for congested and runny nose   Nasacort 2 puffs each nostril daily  Normal saline OTC nasal spray 2 puffs 4 times daily as needed to prevent nasal dryness   Robitussin CF 2 teaspoon 4 times daily as needed for cough   Continue current auto CPAP 7-13 cwp  as patient is using and benefiting from CPAP use  Advised against drowsy driving and to avoid alcoholic beverage and respiratory depressants as these may worsen sleep apnea        Follow up: 6 months     Vish Briggs MD      Obstructive Sleep Apnea  Obstructive sleep apnea is a condition caused by air passages becoming narrowed or blocked during sleep. As a result, breathing stops for short periods. Your body wakes up enough for breathing to start again. But you don't remember it. The cycle of stopped breathing and brief awakenings can repeat dozens of times a night. This prevents the body from getting to the deeper stages of sleep that are needed for good rest.   Signs of sleep apnea include loud snoring, noisy breathing, and gasping sounds during sleep. People with sleep apnea often find they use the bathroom many times during the night. Daytime symptoms include waking up tired after a full night's sleep and waking up with headaches. They can also include feeling very sleepy or falling asleep during the day, and having problems with memory or concentration.   Risk factors for sleep apnea include:  Being overweight  Being assigned male at birth, or being in menopause  Smoking  Using alcohol or sedating medicines  Having enlarged structures in the nose or throat such as enlarged tonsils or adenoids, or extra tissue in the airway  Home care  Lifestyle changes that can help treat snoring and sleep apnea include:   If you're overweight, talk with your healthcare provider about a weight-loss  plan for you.  Don't drink alcohol for 3 to 4 hours before bedtime.  Don't take sedating medicines. Ask your healthcare provider about the medicines you take.  If you smoke, talk to your provider about ways to quit. It's important to stay away from secondhand smoke. Don't use e-cigarettes because of their harmful side effects.  Sleep on your side. This can help prevent gravity from pulling relaxed throat tissues into your breathing passages.  If you have allergies or sinus problems that block your nose, ask your provider for help.  Use positive airway pressure (PAP). Discuss with your provider the benefits of using PAP at home. And talk about the type of PAP that's best for you.  Follow-up care  Follow up with your healthcare provider, or as advised. A diagnosis of sleep apnea is made with a sleep study. Your provider can tell you more about this test.   When to get medical care  See your healthcare provider if you have daytime symptoms of sleep apnea. These include:   Waking up tired after a full night's sleep  Waking up with a headache  Feeling very sleepy or falling asleep during the day  Having problems with memory or concentration  Also talk with your provider if your partner tells you that you snore, gasp for air, or stop breathing while you sleep.   Seeing your provider is important because sleep apnea can make you more likely to have certain health problems. These include high blood pressure, heart attack, stroke, and sexual dysfunction. If you have sleep apnea, talk with your healthcare provider about the best treatments for you.   [employer] last reviewed this educational content on 5/1/2022 © 2000-2023 [employer]. All rights reserved. This information is not intended as a substitute for professional medical care. Always follow your healthcare professional's instructions.        Continuous Positive Air Pressure (CPAP)     A mask over the nose gently directs air into the throat to keep the  airway open.     Continuous positive air pressure (CPAP) uses gentle air pressure to hold the airway open. CPAP is often the most effective treatment for sleep apnea. It works very well as a treatment for adults diagnosed with obstructive sleep apnea with a lot of sleepiness. But keep in mind that it can take several adjustments before the setup is right for you.   How CPAP works  The CPAP machine  is a small portable pump that sits beside the bed. The pump sends air through a hose, which is held over your nose alone, or nose and mouth by a mask. Mild air pressure is gently pushed through your airway. The air pressure nudges sagging tissues aside. This widens the airway so you can breathe better. CPAP may be combined with other kinds of therapy for sleep apnea.   Types of air pressure treatments  There are different types of CPAP. Your doctor or CPAP technician will help you decide which type is best for you:   Basic CPAP keeps the pressure constant all night long.  A bilevel device (BiPAP) provides more pressure when you breathe in and less when you breathe out. A BiPAP machine also may be set to provide automatic breaths to maintain breathing if you stop breathing while sleeping.  An autoCPAP device automatically adjusts pressure throughout the night and in response to changes such as body position, sleep stage, and snoring.  [employer] last reviewed this educational content on 7/1/2019  © 4130-7135 [employer]. All rights reserved. This information is not intended as a substitute for professional medical care. Always follow your healthcare professional's instructions.

## 2024-09-03 NOTE — PROGRESS NOTES
Pulmonary/Critical Care/Sleep Medicine   Progress note     PCP: Rose Ngo DO   Phone: [phone]   Fax: [phone]        Chief Complaint   Patient presents with    Follow - Up     Pt here for 3 month f/u pt here to go over lab work and CT 8/22, no other concerns        HPI  I had the pleasure of seeing Kirsten Guajardo who is a pleasant 68 year old female who presents for follow up of pulmonary nodules and STAR after visit on 6/6/2024     The patient states that her cough has now resolved after the use of nasal spray    The patient reports using auto CPAP daily at night at 7-13 cwp cmH2O regularly throughout the night for about 8 hours and states that the  PAP machine is quiet and has a heated humidifier.  The patient denies  daytime hypersomnolence or fatigue.         The patient denies kicking legs at night.        She drinks no caffeine coffee daily       She has pets 2 dogs  that do not sleep in bed.  Her dogs are vaccinated.       Hx of tobacco use: She  reports that she quit smoking about 36 years ago. Her smoking use included cigarettes. She has a 30 pack-year smoking history. She has never used smokeless tobacco.    Past Medical History:    Abdominal distention    Abdominal pain    ALCOHOL USE    Allergic rhinitis    Not sure    Anesthesia complication    Atherosclerosis of coronary artery    Belching    Bloating    Chest pain    Constipation    Diarrhea, unspecified    Essential hypertension    Flatulence/gas pain/belching    Heart palpitations    High cholesterol    History of cardiac murmur    HLD (hyperlipidemia)    HTN (hypertension)    Human papilloma virus infection    Hyperlipidemia    Indigestion    Irregular bowel habits    Nausea    Night sweats    Paroxysmal SVT (supraventricular tachycardia) (HCC)    PONV (postoperative nausea and vomiting)    Sleep apnea    uses CPAP    Visual impairment    contacts and glasses    Wears glasses      Past Surgical History:   Procedure Laterality  Date    Colonoscopy  03/09/2009    Colonoscopy      E[facility]      Eye surgery Right     eyelid    Other      LEX and right hip injection    Other surgical history  Coccyx removed    Removal of coccyx      Roach teeth removed       Allergies   Allergen Reactions    Atenolol HIVES    Beta Adrenergic Blockers HIVES     Atenolo, Toprol.    Duloxetine OTHER (SEE COMMENTS)    Duloxetine Hcl PALPITATIONS and OTHER (SEE COMMENTS)     Other reaction(s): Other (See Comments)    Penicillins HIVES and RASH    Sulfa Antibiotics SWELLING and OTHER (SEE COMMENTS)     Other reaction(s): Other (See Comments)    Severe joint pain    Other reaction(s): Swelling    Simvastatin CONFUSION     Other reaction(s): Altered Mental Status    Lisinopril Coughing     Current Outpatient Medications   Medication Sig Dispense Refill    OMEPRAZOLE 20 MG Oral Capsule Delayed Release TAKE 1 CAPSULE BY MOUTH TWICE  DAILY IN THE MORNING AND BEFORE  BEDTIME 180 capsule 3    dilTIAZem HCl ER Beads (TIADYLT ER) 180 MG Oral Capsule SR 24 Hr Take 1 capsule (180 mg total) by mouth 2 (two) times daily. 180 capsule 3    dilTIAZem HCl ER Coated Beads 180 MG Oral Capsule SR 24 Hr Take 1 capsule (180 mg total) by mouth 2 (two) times daily. 180 capsule 3    citalopram 20 MG Oral Tab Take 1 tablet (20 mg total) by mouth daily. 90 tablet 3    losartan 100 MG Oral Tab Take 1 tablet (100 mg total) by mouth daily. 90 tablet 1    rosuvastatin 5 MG Oral Tab Take 1 tablet (5 mg total) by mouth nightly. 90 tablet 3    pantoprazole 40 MG Oral Tab EC Take 1 tablet (40 mg total) by mouth every morning before breakfast. 30 tablet 1    aspirin 81 MG Oral Tab EC Take 1 tablet (81 mg total) by mouth daily.      Calcium Carb-Cholecalciferol (CALCIUM + D3) 600-200 MG-UNIT Oral Tab Take 1 tablet by mouth daily.        multivitamin Oral Tab Take 1 tablet by mouth daily.        Social History     Socioeconomic History    Marital status:    Occupational History    Occupation: RN      Comment: retired   Tobacco Use    Smoking status: Former     Current packs/day: 0.00     Average packs/day: 2.0 packs/day for 15.0 years (30.0 ttl pk-yrs)     Types: Cigarettes     Quit date: 1987     Years since quittin.9    Smokeless tobacco: Never   Vaping Use    Vaping status: Never Used   Substance and Sexual Activity    Alcohol use: Yes     Alcohol/week: 1.0 standard drink of alcohol     Types: 1 Glasses of wine per week     Comment: soc    Drug use: Never    Sexual activity: Yes     Partners: Male   Other Topics Concern    Caffeine Concern No    Exercise Yes     Comment: yoga/swimming twice weekly    Seat Belt Yes    Self-Exams No     Comment: Every 3 months      Immunization History   Administered Date(s) Administered    Covid-19 Vaccine Moderna 100 mcg/0.5 ml 2021, 2021, 10/22/2021    Covid-19 Vaccine Moderna 50 Mcg/0.25 Ml 2022    Covid-19 Vaccine Moderna Bivalent 50mcg/0.5mL 12+ years 2022, 2023    FLU VAC High Dose 65 YRS & Older PRSV Free ([id number]) 10/15/2020, 2021, 10/11/2022    FLULAVAL 6 months & older 0.5 ml Prefilled syringe ([id number]) 10/26/2017    FLUZONE 6 months and older PFS 0.5 ml ([id number]) 10/13/2014, 10/19/2015, 10/04/2016, 10/26/2017, 10/03/2018, 2019    Fluarix 6 Months And Older 0.5 ml prefilled syringe ([id number]) 2019    Fluvirin, 3 Years & >, Im 09/15/2010, 2012, 10/04/2013    Fluzone Vaccine Medicare () 10/15/2020    High Dose Fluzone Influenza Vaccine, 65yr+ PF 0.5mL (67734) 2021, 10/11/2022, 2023    Hpv Virus Vaccine 9 Elaine Im 2022, 2022, 2022    Influenza 10/13/2014, 10/19/2015, 10/04/2016    Influenza(Afluria)0.5ml QIV PFS 10/03/2018    Moderna Covid-19 Vaccine 50mcg/0.5ml 12yrs+ ([id number]) 2023    Pneumococcal Conjugate PCV20 2022    Pneumovax 23 2020    TDAP 2015    Zoster Vaccine Live (Zostavax) 2016    Zoster Vaccine Recombinant Adjuvanted (Shingrix)  01/20/2020, 05/31/2020, 06/01/2020      Family History   Problem Relation Age of Onset    Heart Disease Mother     Hypertension Mother     Heart Attack Mother     Stroke Mother     Other (rheumatoid arthritis) Mother     Hypertension Father     Heart Attack Father 75    Heart Disorder Father     No Known Problems Sister         Review of Systems   Constitutional:  Negative for fatigue, fever and unexpected weight change.   HENT:  Positive for congestion, postnasal drip and rhinorrhea. Negative for mouth sores, nosebleeds, sore throat and trouble swallowing.    Eyes:  Negative for visual disturbance.   Respiratory:  Positive for cough. Negative for apnea, choking, chest tightness, shortness of breath and wheezing.    Cardiovascular:  Negative for chest pain, palpitations and leg swelling.   Gastrointestinal:  Negative for abdominal pain, constipation, diarrhea, nausea and vomiting.   Genitourinary:  Negative for difficulty urinating.   Musculoskeletal:  Negative for arthralgias, back pain, gait problem and myalgias.   Neurological:  Negative for dizziness, weakness and headaches.   Psychiatric/Behavioral:  Negative for sleep disturbance.         Vitals:    09/03/24 1031   BP: 102/60   Pulse: 78   Resp: 18   Temp: 98.3 °F (36.8 °C)      SpO2: 99 %  Ht Readings from Last 1 Encounters:   09/03/24 5' 8\" (1.727 m)     Wt Readings from Last 1 Encounters:   09/03/24 160 lb (72.6 kg)     Body mass index is 24.33 kg/m².     Physical Exam  Constitutional:       General: She is not in acute distress.     Appearance: Normal appearance. She is not ill-appearing or diaphoretic.   HENT:      Head: Normocephalic and atraumatic.      Nose: Nose normal. No congestion or rhinorrhea.      Mouth/Throat:      Mouth: Mucous membranes are moist.      Pharynx: Oropharynx is clear. No oropharyngeal exudate or posterior oropharyngeal erythema.      Comments: Small/crowded oropharynx with Mallampati class I palate  Eyes:      Extraocular  Movements: Extraocular movements intact.      Pupils: Pupils are equal, round, and reactive to light.   Cardiovascular:      Rate and Rhythm: Normal rate.      Pulses: Normal pulses.      Heart sounds: Normal heart sounds. No murmur heard.  Pulmonary:      Effort: Pulmonary effort is normal. No respiratory distress.      Breath sounds: Normal breath sounds. No wheezing or rhonchi.   Chest:      Chest wall: No tenderness.   Abdominal:      General: Abdomen is flat. Bowel sounds are normal.      Palpations: Abdomen is soft.   Musculoskeletal:         General: Normal range of motion.   Skin:     General: Skin is warm.   Neurological:      General: No focal deficit present.      Mental Status: She is alert and oriented to person, place, and time.   Psychiatric:         Mood and Affect: Mood normal.         Behavior: Behavior normal.         Thought Content: Thought content normal.         Judgment: Judgment normal.             Labs:  Last BMP  Lab Results   Component Value Date     (H) 11/27/2023    BUN 16 11/27/2023    CREATSERUM 0.91 11/27/2023    BUNCREA 12.5 10/06/2020    ANIONGAP 3 11/27/2023    GFRAA 71 06/06/2022    GFRNAA 62 06/06/2022    CA 8.8 11/27/2023     11/27/2023    K 4.3 11/27/2023     11/27/2023    CO2 25.0 11/27/2023    OSMOCALC 285 11/27/2023      Last CBC  Lab Results   Component Value Date    WBC 7.6 06/11/2024    RBC 3.85 06/11/2024    HGB 12.7 06/11/2024    HCT 37.3 06/11/2024    MCV 96.9 06/11/2024    MCH 33.0 06/11/2024    MCHC 34.0 06/11/2024    RDW 12.8 06/11/2024    .0 06/11/2024      Last CMP  Lab Results   Component Value Date     (H) 11/27/2023    BUN 16 11/27/2023    BUNCREA 12.5 10/06/2020    CREATSERUM 0.91 11/27/2023    ANIONGAP 3 11/27/2023    GFR 77 11/15/2017    GFRNAA 62 06/06/2022    GFRAA 71 06/06/2022    CA 8.8 11/27/2023    OSMOCALC 285 11/27/2023    ALKPHO 88 11/27/2023    AST 17 11/27/2023    ALT 23 11/27/2023    BILT 0.7 11/27/2023    TP 7.4  11/27/2023    ALB 3.7 11/27/2023    GLOBULIN 3.7 11/27/2023     11/27/2023    K 4.3 11/27/2023     11/27/2023    CO2 25.0 11/27/2023      Last Thyroid Function  Lab Results   Component Value Date    TSH 2.860 11/27/2023          Component  Ref Range & Units 6/11/24 11:05 AM   Allergen Chauncey  <0.10 kUA/L 0.56 High    Allergen Brazil Nut  <0.10 kUA/L <0.10   Allergen Cashew  <0.10 kUA/L <0.10   Allergen Clam  <0.10 kUA/L <0.10   Allergen Corn  <0.10 kUA/L 0.23 High    Allergen Egg White  <0.10 kUA/L <0.10   Allergen Fish  <0.10 kUA/L <0.10   Allergen Hazelnut  <0.10 kUA/L 0.20 High    Allergen Milk  <0.10 kUA/L <0.10   Allergen Peanut  <0.10 kUA/L 0.55 High    Allergen [place]  <0.10 kUA/L <0.10   Allergen Scallop  <0.10 kUA/L <0.10   Allergen Sesame Seed  <0.10 kUA/L <0.10   Allergen Shrimp  <0.10 kUA/L <0.10   Allergen Soybean  <0.10 kUA/L <0.10   Allergen [place]  <0.10 kUA/L 0.24 High    Allergen Wheat  <0.10 kUA/L <0.10   Immunoglobulin E  2.00 - 214.00 kU/L 7.01            Component  Ref Range & Units 6/11/24 11:05 AM   Allergen A. Alternata  <0.10 kUA/L <0.10   Allergen Aspergillus Fumigatus  <0.10 kUA/L <0.10   Allergen Bermuda Grass  <0.10 kUA/L <0.10   Allergen Birch Tree  <0.10 kUA/L <0.10   Allergen Box Elder  <0.10 kUA/L <0.10   Allergen C. Herbarum  <0.10 kUA/L <0.10   Allergen Cat Dander  <0.10 kUA/L <0.10   Allergen Cockroach  <0.10 kUA/L <0.10   Allergen Common Pigweed  <0.10 kUA/L <0.10   Allergen Common Ragweed  <0.10 kUA/L <0.10   Allergen [place] Tree  <0.10 kUA/L <0.10   Allergen D. Farinae  <0.10 kUA/L <0.10   Allergen D. Pteronyssinus  <0.10 kUA/L <0.10   Allergen Dog Dander  <0.10 kUA/L <0.10   Allergen Elm Tree  <0.10 kUA/L <0.10   Allergen Marsh Elder  <0.10 kUA/L <0.10   Allergen Mouse Epithelium  <0.10 kUA/L <0.10   Allergen [place]  <0.10 kUA/L <0.10   Allergen Mucor Racemosus  <0.10 kUA/L <0.10   Allergen [place]  <0.10 kUA/L <0.10   Allergen [place] Tree  <0.10 kUA/L  <0.10   Allergen Pecan/Hickory  <0.10 kUA/L <0.10   Allergen Penicillium Notatum  <0.10 kUA/L <0.10   Allergen Russian Thistle  <0.10 kUA/L <0.10   Allergen [place]  <0.10 kUA/L <0.10   Allergen [place] Grass  <0.10 kUA/L <0.10   Allergen [place] Tree  <0.10 kUA/L <0.10   Allergen White Saleem  <0.10 kUA/L <0.10   Immunoglobulin E  2.00 - 214.00 kU/L 6.56            Imaging:    PROCEDURE:  CT CHEST(CONTRAST ONLY) (CPT=71260)personally reviewed      COMPARISON:  [facility] , CT, CT CHEST (CPT=71250), 5/29/2024, 1:09 PM., chest CT 05/06/2022     INDICATIONS:  G47.33 STAR (obstructive sleep apnea)     TECHNIQUE:  CT images were obtained with non-ionic intravenous contrast material. Dose reduction techniques were used. Dose information is transmitted to the ACR (American College of Radiology) NRDR (National Radiology Data Registry) which includes the  Dose Index Registry.     PATIENT STATED HISTORY:(As transcribed by Technologist)  Lung nodule      CONTRAST USED:  75cc of Isovue 370     FINDINGS:       Heart size within normal limits.  No pericardial effusion.  Moderate LAD coronary calcification.  Normal caliber of the thoracic aorta with mild calcified atherosclerosis throughout its course.  No central pulmonary embolism.  No regional  lymphadenopathy.  Normal esophagus.  No central airway stenosis.     No pulmonary edema or focal airspace consolidation.  No new or enlarging pulmonary nodules, index solid nodule of the right lower lobe measuring 6 mm (series 3, image 115), index solid nodule of the left lower lobe measuring 8 mm (image 95).  No new  pulmonary nodules identified.  The pleural spaces are clear.     Regional soft tissues are within normal limits.  Suspected transient perfusion variation of the posterior right hepatic lobe.     Mild degenerative changes of the spine.  No aggressive osseous lesions.                   Impression   CONCLUSION:       Continued stability of multiple solid pulmonary nodules measuring  up to 8 mm.  These are generally stable in size dating back to 2022 favoring a benign process.  No new abnormalities detected.        LOCATION:  [facility]        Dictated by (CST): Zaira Cabezas MD on 8/22/2024 at 4:02 PM        PROCEDURE:  CT CHEST (CPT=71250) personally reviewed     COMPARISON:  [facility] , CT, CT CHEST (CPT=71250), 5/06/2022, 7:14 AM.  [facility] , CT, CT CHEST (CPT=71250), 5/10/2023, 8:36 AM.     INDICATIONS:  R91.1 Incidental lung nodule, greater than or equal to 8mm     TECHNIQUE:  Unenhanced multislice CT scanning is performed through the chest.  Dose reduction techniques were used. Dose information is transmitted to the ACR (American College of Radiology) NRDR (National Radiology Data Registry) which includes the Dose   Index Registry.     PATIENT STATED HISTORY: (As transcribed by Technologist)  Nodule         FINDINGS:    LUNGS:  No focal consolidation.  Biapical pleural parenchymal scarring.  Re-identified are several nodules bilaterally.  These include largest nodules within the right lower lobe which measures 8 x 4 versus 9 x 4 mm, image 114 of series 3 and left lower  lobe 8 x 7 versus 6 x 6 mm nodule, image 100 of series 3  VASCULATURE:  Pulmonary vessels are unremarkable within the limits of a noncontrast CT.    MARY:  No mass or adenopathy.    MEDIASTINUM:  No mass or adenopathy.    CARDIAC:  No enlargement, pericardial thickening, or significant coronary artery calcification.  Moderate coronary artery calcifications.  PLEURA:  No mass or effusion.    THORACIC AORTA:  Mild atherosclerosis.  CHEST WALL:  No mass or axillary adenopathy.    LIMITED ABDOMEN:  Limited images of the upper abdomen demonstrate a partially imaged punctate nonobstructing left renal calcification.    BONES:  Stable including mild degenerative change.  No fracture.                     Impression   CONCLUSION:    1. When compared to most recent noncontrast CT of the chest performed 5/10/2023, there is slight interval  increase in size of left lower lobe nodule.  PET imaging can be performed for further evaluation as clinically indicated.     LOCATION:  [facility]        Dictated by (CST): Sarah Cifuentes MD on 5/29/2024 at 6:17 PM         Impression:    Bilateral pulmonary nodules waxing and waning in an asymptomatic patient but with history of smoking 30 pack years though stopped smoking in 1987: CT chest on 5/29/2024 shows Right lower lobe which measures 8 x 4 versus 9 x 4 mm, image 114 of series 3 and left lower lobe 8 x 7 versus 6 x 6 mm nodule, image 100 of series: Differential diagnosis would include CT chest variation, vs fungal granulomas versus atypical mycobacterial infection versus granulomatosis with polyangiitis or microscopic polyangiitis versus collagen vascular disease like rheumatoid arthritis versus sarcoid versus cryptogenic organizing pneumonia versus inflammatory bowel disease versus neoplastic process etc.: CT chest on 8/22/2024 shows compared to most recent noncontrast CT of the chest performed 5/10/2023, there is slight interval increase in size of left lower lobe nodule  Obstructive sleep apnea syndrome (OSAS) Treated with CPAP 7-13 cwp. Download data on 8/29/2024 for 90 days shows adequate AHI and compliance   Daytime hypersomnolence/fatigue  History of 30-pack-year tobacco smoking though stopped in 1987  Body mass index is 24.33 kg/m².  Allergic Rhinitis: With recurrent symptoms: controlled   Hypertension   Hyperlipidemia  PSVT: controlled                                 Plan:    Schedule PET scan per radiology recommendation to evaluate left lower lobe nodules   Continue Azelastine 137 mcg 1 puff twice daily as needed for congested and runny nose   Nasacort 2 puffs each nostril daily  Normal saline OTC nasal spray 2 puffs 4 times daily as needed to prevent nasal dryness   Robitussin CF 2 teaspoon 4 times daily as needed for cough   Continue current auto CPAP 7-13 cwp  as patient is using and benefiting from CPAP  use  Advised against drowsy driving and to avoid alcoholic beverage and respiratory depressants as these may worsen sleep apnea        Follow up: 6 months     Thank you for allowing me to participate in your patient care.    AMIE Briggs MD, FACP, FCCP, [facility] - Pulmonary/Critical care/Sleep Medicine  Please contact our office if you have any questions or concerns at [phone]    Note to the patient: The 21st Century Cures Act makes medical notes like these available to patients in the interest of transparency. However, be advised that this is a medical document. It is intended as peer to peer communication. It is written in medical language and may contain abbreviations or verbiage that are unfamiliar. It may appear blunt or direct. Medical documents are intended to carry relevant information, facts as evident, and clinical opinion of the practitioner.      Disclaimer: Components of this note were documented using voice recognition system and are subject to errors not corrected at proofreading. Contact the author of this note for any clarifications

## 2024-10-08 ENCOUNTER — TELEPHONE (OUTPATIENT)
Dept: FAMILY MEDICINE CLINIC | Facility: CLINIC | Age: 69
End: 2024-10-08

## 2024-10-08 DIAGNOSIS — N95.1 MENOPAUSAL AND FEMALE CLIMACTERIC STATES: ICD-10-CM

## 2024-10-21 NOTE — TELEPHONE ENCOUNTER
Refill request for:    Requested Prescriptions     Pending Prescriptions Disp Refills    CITALOPRAM 20 MG Oral Tab [Pharmacy Med Name: Citalopram Hydrobromide 20 MG Oral Tablet] 90 tablet 3     Sig: TAKE 1 TABLET BY MOUTH DAILY        Last Prescribed Quantity Refills   11/28/23 90 3     LOV 11/28/2023     Patient was asked to follow-up in: Follow-up not documented in note    Appointment scheduled: Visit date not found    Medication failed protocol due to:   In person appointment or virtual visit in the past 6 mos or appointment in next 3 mos     Pt due soon for Physical on 11/28/24  Please assist with appointment  Thank you.      Routed to front office

## 2024-11-07 ENCOUNTER — OFFICE VISIT (OUTPATIENT)
Dept: OBGYN CLINIC | Facility: CLINIC | Age: 69
End: 2024-11-07
Payer: MEDICARE

## 2024-11-07 VITALS
BODY MASS INDEX: 25 KG/M2 | SYSTOLIC BLOOD PRESSURE: 130 MMHG | HEART RATE: 119 BPM | WEIGHT: 164 LBS | DIASTOLIC BLOOD PRESSURE: 82 MMHG

## 2024-11-07 DIAGNOSIS — Z12.4 SCREENING FOR CERVICAL CANCER: ICD-10-CM

## 2024-11-07 DIAGNOSIS — Z12.31 ENCOUNTER FOR SCREENING MAMMOGRAM FOR MALIGNANT NEOPLASM OF BREAST: ICD-10-CM

## 2024-11-07 DIAGNOSIS — Z01.419 WELL WOMAN EXAM WITH ROUTINE GYNECOLOGICAL EXAM: Primary | ICD-10-CM

## 2024-11-07 PROCEDURE — 87624 HPV HI-RISK TYP POOLED RSLT: CPT | Performed by: OBSTETRICS & GYNECOLOGY

## 2024-11-07 PROCEDURE — 88175 CYTOPATH C/V AUTO FLUID REDO: CPT | Performed by: OBSTETRICS & GYNECOLOGY

## 2024-11-07 PROCEDURE — G0101 CA SCREEN;PELVIC/BREAST EXAM: HCPCS | Performed by: OBSTETRICS & GYNECOLOGY

## 2024-11-07 NOTE — PROGRESS NOTES
[facility]  Obstetrics and Gynecology  History & Physical    CC: Patient presents for a well woman exam     Subjective:     HPI: Kirsten Guajardo is a 69 year old  female here for a well women exam. 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 She has a hx of ASCUS HPV positive in 10/2017 and s/p colposcopy noted for BITA I with negative ECC in 2017. A normal repeat pap smear in 2018. She had a colposcopy with cervical biopsy and ECC on 21 2/2 ASCUS, HPV positive. Her cervical biopsy was noted for negative cervical biopsy but insufficient ECC with inadequate colposcopy. Her repeat ECC was non-diagnostic. Therefore, recommend to repeat pap smear with co-testing and ECC on 21.  Repeat Pap smear noted for NILM, HPV positive but negative 16/18/45.  ECC noted for scant degenerated cells, not further diagnostic.  Patient requested repeat sampling in 6 months.  Repeat Pap smear 3/4/2022 noted for LSIL. Hx of LEEP on 22 2/2 persistent BITA I.  Pathology noted for focal BITA-1 and benign ECC.  Hx of LEEP on 22 2/2 persistent BITA I.     OB History:  OB History    Para Term  AB Living   0 0 0 0 0 0   SAB IAB Ectopic Multiple Live Births   0 0 0 0 0       Gyne History:  Hx Prior Abnormal Pap: Yes  Pap Date: 10/17/23  Pap Result Notes: WNL  No LMP recorded (lmp unknown). Patient is postmenopausal.  NILM HPV neg 10/2023  denies history of STDs (non-HPV).   Sexual history: Active? yes      Meds:  Medications Ordered Prior to Encounter[1]    All:  Allergies[2]    PMH:  Past Medical History:    Abdominal distention    Abdominal pain    ALCOHOL USE    Allergic rhinitis    Not sure    Anesthesia complication    Atherosclerosis of coronary artery    Belching    Bloating    Chest pain    Constipation    Diarrhea, unspecified    Essential hypertension    Flatulence/gas pain/belching    Heart  palpitations    High cholesterol    History of cardiac murmur    HLD (hyperlipidemia)    HTN (hypertension)    Human papilloma virus infection    Hyperlipidemia    Indigestion    Irregular bowel habits    Nausea    Night sweats    Paroxysmal SVT (supraventricular tachycardia) (HCC)    PONV (postoperative nausea and vomiting)    Sleep apnea    uses CPAP    Visual impairment    contacts and glasses    Wears glasses       Immunization History:   Immunization History   Administered Date(s) Administered    Covid-19 Vaccine Moderna 100 mcg/0.5 ml 02/17/2021, 03/18/2021, 10/22/2021    Covid-19 Vaccine Moderna 50 Mcg/0.25 Ml 04/01/2022    Covid-19 Vaccine Moderna Bivalent 50mcg/0.5mL 12+ years 09/07/2022, 04/24/2023    FLU VAC High Dose 65 YRS & Older PRSV Free (35451) 10/15/2020, 09/22/2021, 10/11/2022    FLULAVAL 6 months & older 0.5 ml Prefilled syringe (75093) 10/26/2017    FLUZONE 6 months and older PFS 0.5 ml (09101) 10/13/2014, 10/19/2015, 10/04/2016, 10/26/2017, 10/03/2018, 09/09/2019    Fluarix 6 Months And Older 0.5 ml prefilled syringe (75806) 09/09/2019    Fluvirin, 3 Years & >, Im 09/15/2010, 09/19/2012, 10/04/2013    Fluzone Vaccine Medicare () 10/15/2020    High Dose Fluzone Influenza Vaccine, 65yr+ PF 0.5mL (00402) 09/22/2021, 10/11/2022, 09/19/2023    Hpv Virus Vaccine 9 Elaine Im 06/27/2022, 08/29/2022, 12/30/2022    Influenza 10/13/2014, 10/19/2015, 10/04/2016    Influenza(Afluria)0.5ml QIV PFS 10/03/2018    Moderna Covid-19 Vaccine 50mcg/0.5ml 12yrs+ 09/19/2023    Pneumococcal Conjugate PCV20 04/25/2022    Pneumovax 23 11/30/2020    TDAP 06/02/2015    Zoster Vaccine Live (Zostavax) 11/01/2016    Zoster Vaccine Recombinant Adjuvanted (Shingrix) 01/20/2020, 05/31/2020, 06/01/2020       PSH:  Past Surgical History:   Procedure Laterality Date    Colonoscopy  03/09/2009    Colonoscopy      Egd      Eye surgery Right     eyelid    Other      LEX and right hip injection    Other surgical history  Coccyx  removed    Removal of coccyx      [place] teeth removed         Social History:  Social History     Socioeconomic History    Marital status:      Spouse name: Not on file    Number of children: Not on file    Years of education: Not on file    Highest education level: Not on file   Occupational History    Occupation: RN     Comment: retired   Tobacco Use    Smoking status: Former     Current packs/day: 0.00     Average packs/day: 2.0 packs/day for 15.0 years (30.0 ttl pk-yrs)     Types: Cigarettes     Quit date: 1987     Years since quittin.1    Smokeless tobacco: Never   Vaping Use    Vaping status: Never Used   Substance and Sexual Activity    Alcohol use: Yes     Alcohol/week: 1.0 standard drink of alcohol     Types: 1 Glasses of wine per week     Comment: soc    Drug use: Never    Sexual activity: Yes     Partners: Male   Other Topics Concern     Service Not Asked    Blood Transfusions Not Asked    Caffeine Concern No    Occupational Exposure Not Asked    Hobby Hazards Not Asked    Sleep Concern Not Asked    Stress Concern Not Asked    Weight Concern Not Asked    Special Diet Not Asked    Back Care Not Asked    Exercise Yes     Comment: yoga/swimming twice weekly    Bike Helmet Not Asked    Seat Belt Yes    Self-Exams No     Comment: Every 3 months   Social History Narrative    Not on file     Social Drivers of Health     Financial Resource Strain: Not on file   Food Insecurity: Not on file   Transportation Needs: Not on file   Physical Activity: Not on file   Stress: Not on file   Social Connections: Not on file   Housing Stability: Not on file         Patient feels unsafe or threatened?: denies    Abuse: denies physical, sexual or mental.     Family History:  Family History   Problem Relation Age of Onset    Heart Disease Mother     Hypertension Mother     Heart Attack Mother     Stroke Mother     Other (rheumatoid arthritis) Mother     Hypertension Father     Heart Attack Father 75     Heart Disorder Father     No Known Problems Sister        Health maintenance:  Mammogram (age 40 and q1-2yr): 2024  BI-RADS CATEGORY:    DIAGNOSTIC CATEGORY 2--BENIGN FINDING:       RECOMMENDATIONS:    ROUTINE MAMMOGRAM AND CLINICAL EVALUATION IN 12 MONTHS.     Colonoscopy (age 45 and q10yr): 2019    Review of Systems:  General: no complaints per category. See HPI for additional information.   Breast: no complaints per category. See HPI for additional information.   Respiratory: no complaints per category. See HPI for additional information.   Cardiovascular: no complaints per category. See HPI for additional information.   GI: no complaints per category. See HPI for additional information.   : no complaints per category. See HPI for additional information.   Heme: no complaints per category. See HPI for additional information.       Objective:     Vitals:    24 1111   BP: 130/82   Pulse: 119   Weight: 164 lb (74.4 kg)         Body mass index is 24.94 kg/m².    General: AAO.NAD.   CVS exam: normal peripheral perfusion  Chest: non-labored breathing, no tachypnea   Breast:  symmetric, no dominant or suspicious mass, no skin or nipple changes and no axillary adenopathy  Abdominal exam:  soft, nontender, nondistended  Pelvic exam:   VULVA: normal appearing vulva with no masses, tenderness or lesions  PERINEUM:  normal appearing, no lesions   URETHRAL MEATUS:  normal appearing, no lesions   VAGINA: normal appearing vagina with normal color and discharge, no lesions  CERVIX: normal appearing cervix without discharge or lesions  UTERUS: uterus is normal size, shape, consistency and nontender  ADNEXA: normal adnexa in size, nontender and no masses  PERIRECTAL: normal appearing, no lesions   Ext: non-tender, no edema    Assessment:     Kirsten Guajardo is a 69 year old  female here for a well women exam.       Plan:       Problem List Items Addressed This Visit    None  Visit Diagnoses       Well woman  exam with routine gynecological exam    -  Primary    Screening for cervical cancer        Relevant Orders    ThinPrep PAP with HPV Reflex Request B    Encounter for screening mammogram for malignant neoplasm of breast        Relevant Orders    [facility] KAMRYN 2D+3D SCREENING BILAT (CPT=77067/89244)            Cervical cancer screening  - discussion held with the patient about ASCCP guidelines  - repeat pap smear today   Health maintenance  - encouraged to maintain weight at healthy BMI  - discussed importance of exercise and healthy eating  - self breast exam instructions provided   - bilateral screening mammogram recommendations discussed and order provided        Problem List Items Addressed This Visit    None  Visit Diagnoses       Well woman exam with routine gynecological exam    -  Primary    Screening for cervical cancer        Relevant Orders    ThinPrep PAP with HPV Reflex Request B    Encounter for screening mammogram for malignant neoplasm of breast        Relevant Orders    [facility] KAMRYN 2D+3D SCREENING BILAT (CPT=77067/81522)                  All of the findings and plan were discussed with the patient.  She notes understanding and agrees with the plan of care.  All questions were answered to the best of my ability at this time.      RTC in 1 year for a well woman exam or sooner if needed     Kaci Rich MD   [facility] - OBGYN      Discussed with patient that there will not be further notification of normal or benign results other than receiving results on [employer]. A [employer] message or telephone call will be placed by the physician and/or office staff if results are abnormal.       Note to patient and family   The 21st Century Cures Act makes medical notes available to patients in the interest of transparency.  However, please be advised that this is a medical document.  It is intended as [id number]-qjfv communication.  It is written and medical language may contain abbreviations or verbiage that are technical and  unfamiliar.  It may appear blunt or direct.  Medical documents are intended to carry relevant information, facts as evident, and the clinical opinion of the practitioner.      This note could include assistance by Dragon voice recognition. Errors in content may be related to improper recognition by the system; efforts to review and correct have been done but errors may still exist.            [1]   Current Outpatient Medications on File Prior to Visit   Medication Sig Dispense Refill    OMEPRAZOLE 20 MG Oral Capsule Delayed Release TAKE 1 CAPSULE BY MOUTH TWICE  DAILY IN THE MORNING AND BEFORE  BEDTIME 180 capsule 3    dilTIAZem HCl ER Beads (TIADYLT ER) 180 MG Oral Capsule SR 24 Hr Take 1 capsule (180 mg total) by mouth 2 (two) times daily. 180 capsule 3    dilTIAZem HCl ER Coated Beads 180 MG Oral Capsule SR 24 Hr Take 1 capsule (180 mg total) by mouth 2 (two) times daily. 180 capsule 3    citalopram 20 MG Oral Tab Take 1 tablet (20 mg total) by mouth daily. 90 tablet 3    losartan 100 MG Oral Tab Take 1 tablet (100 mg total) by mouth daily. 90 tablet 1    rosuvastatin 5 MG Oral Tab Take 1 tablet (5 mg total) by mouth nightly. 90 tablet 3    aspirin 81 MG Oral Tab EC Take 1 tablet (81 mg total) by mouth daily.      Calcium Carb-Cholecalciferol (CALCIUM + D3) 600-200 MG-UNIT Oral Tab Take 1 tablet by mouth daily.        multivitamin Oral Tab Take 1 tablet by mouth daily.      pantoprazole 40 MG Oral Tab EC Take 1 tablet (40 mg total) by mouth every morning before breakfast. 30 tablet 1     No current facility-administered medications on file prior to visit.   [2]   Allergies  Allergen Reactions    Atenolol HIVES    Beta Adrenergic Blockers HIVES     Atenolo, Toprol.    Duloxetine OTHER (SEE COMMENTS)    Duloxetine Hcl PALPITATIONS and OTHER (SEE COMMENTS)     Other reaction(s): Other (See Comments)    Penicillins HIVES and RASH    Sulfa Antibiotics SWELLING and OTHER (SEE COMMENTS)     Other reaction(s): Other (See  Comments)    Severe joint pain    Other reaction(s): Swelling    Simvastatin CONFUSION     Other reaction(s): Altered Mental Status    Lisinopril Coughing

## 2024-11-13 LAB
.: NORMAL
.: NORMAL
HPV E6+E7 MRNA CVX QL NAA+PROBE: NEGATIVE

## 2024-11-19 RX ORDER — CITALOPRAM HYDROBROMIDE 20 MG/1
20 TABLET ORAL DAILY
Qty: 90 TABLET | Refills: 0 | Status: SHIPPED | OUTPATIENT
Start: 2024-11-19

## 2024-12-30 ENCOUNTER — TELEPHONE (OUTPATIENT)
Dept: FAMILY MEDICINE CLINIC | Facility: CLINIC | Age: 69
End: 2024-12-30

## 2024-12-30 DIAGNOSIS — Z00.00 ROUTINE HEALTH MAINTENANCE: Primary | ICD-10-CM

## 2024-12-30 NOTE — TELEPHONE ENCOUNTER
Please enter lab orders for the patient's upcoming physical appointment.     Physical scheduled:   Your appointments       Date & Time Appointment Department ([place])    Jan 16, 2025 9:40 AM CST Medicare Annual Well Visit with Rose Ngo DO [facility], [facility] ([facility])              [facility], [facility]  [address] [place] [id number]  [phone]           Preferred lab: [facility] ([facility])     The patient has been notified to complete fasting labs prior to their physical appointment.

## 2025-01-01 DIAGNOSIS — E78.00 PURE HYPERCHOLESTEROLEMIA: ICD-10-CM

## 2025-01-01 DIAGNOSIS — I25.10 CORONARY ARTERY DISEASE INVOLVING NATIVE CORONARY ARTERY OF NATIVE HEART WITHOUT ANGINA PECTORIS: ICD-10-CM

## 2025-01-04 RX ORDER — ROSUVASTATIN CALCIUM 5 MG/1
5 TABLET, COATED ORAL NIGHTLY
Qty: 90 TABLET | Refills: 0 | Status: SHIPPED | OUTPATIENT
Start: 2025-01-04

## 2025-01-04 NOTE — TELEPHONE ENCOUNTER
[employer] message sent to patient to complete labs before requesting further refills.    Requested Prescriptions     Signed Prescriptions Disp Refills    rosuvastatin 5 MG Oral Tab 90 tablet 0     Sig: TAKE 1 TABLET BY MOUTH EVERY  NIGHT     Authorizing Provider: ANDREW TONEY     Ordering User: MEGHAN COOPER      Refilled per protocol/OV notes

## 2025-01-08 ENCOUNTER — LAB ENCOUNTER (OUTPATIENT)
Dept: LAB | Facility: HOSPITAL | Age: 70
End: 2025-01-08
Attending: FAMILY MEDICINE
Payer: MEDICARE

## 2025-01-08 DIAGNOSIS — Z00.00 ROUTINE HEALTH MAINTENANCE: ICD-10-CM

## 2025-01-08 LAB
ALBUMIN SERPL-MCNC: 4.6 G/DL (ref 3.2–4.8)
ALBUMIN/GLOB SERPL: 1.6 {RATIO} (ref 1–2)
ALP LIVER SERPL-CCNC: 75 U/L
ALT SERPL-CCNC: 22 U/L
ANION GAP SERPL CALC-SCNC: 7 MMOL/L (ref 0–18)
AST SERPL-CCNC: 24 U/L (ref ?–34)
BASOPHILS # BLD AUTO: 0.03 X10(3) UL (ref 0–0.2)
BASOPHILS NFR BLD AUTO: 0.5 %
BILIRUB SERPL-MCNC: 0.7 MG/DL (ref 0.2–1.1)
BUN BLD-MCNC: 16 MG/DL (ref 9–23)
CALCIUM BLD-MCNC: 9.9 MG/DL (ref 8.7–10.4)
CHLORIDE SERPL-SCNC: 106 MMOL/L (ref 98–112)
CHOLEST SERPL-MCNC: 198 MG/DL (ref ?–200)
CO2 SERPL-SCNC: 27 MMOL/L (ref 21–32)
CREAT BLD-MCNC: 0.99 MG/DL
EGFRCR SERPLBLD CKD-EPI 2021: 62 ML/MIN/1.73M2 (ref 60–?)
EOSINOPHIL # BLD AUTO: 0.08 X10(3) UL (ref 0–0.7)
EOSINOPHIL NFR BLD AUTO: 1.2 %
ERYTHROCYTE [DISTWIDTH] IN BLOOD BY AUTOMATED COUNT: 12.8 %
EST. AVERAGE GLUCOSE BLD GHB EST-MCNC: 123 MG/DL (ref 68–126)
FASTING PATIENT LIPID ANSWER: YES
FASTING STATUS PATIENT QL REPORTED: YES
GLOBULIN PLAS-MCNC: 2.8 G/DL (ref 2–3.5)
GLUCOSE BLD-MCNC: 108 MG/DL (ref 70–99)
HBA1C MFR BLD: 5.9 % (ref ?–5.7)
HCT VFR BLD AUTO: 40.2 %
HDLC SERPL-MCNC: 80 MG/DL (ref 40–59)
HGB BLD-MCNC: 13.4 G/DL
IMM GRANULOCYTES # BLD AUTO: 0.02 X10(3) UL (ref 0–1)
IMM GRANULOCYTES NFR BLD: 0.3 %
LDLC SERPL CALC-MCNC: 103 MG/DL (ref ?–100)
LYMPHOCYTES # BLD AUTO: 1.87 X10(3) UL (ref 1–4)
LYMPHOCYTES NFR BLD AUTO: 29 %
MCH RBC QN AUTO: 32.7 PG (ref 26–34)
MCHC RBC AUTO-ENTMCNC: 33.3 G/DL (ref 31–37)
MCV RBC AUTO: 98 FL
MONOCYTES # BLD AUTO: 0.44 X10(3) UL (ref 0.1–1)
MONOCYTES NFR BLD AUTO: 6.8 %
NEUTROPHILS # BLD AUTO: 4 X10 (3) UL (ref 1.5–7.7)
NEUTROPHILS # BLD AUTO: 4 X10(3) UL (ref 1.5–7.7)
NEUTROPHILS NFR BLD AUTO: 62.2 %
NONHDLC SERPL-MCNC: 118 MG/DL (ref ?–130)
OSMOLALITY SERPL CALC.SUM OF ELEC: 292 MOSM/KG (ref 275–295)
PLATELET # BLD AUTO: 331 10(3)UL (ref 150–450)
POTASSIUM SERPL-SCNC: 4.8 MMOL/L (ref 3.5–5.1)
PROT SERPL-MCNC: 7.4 G/DL (ref 5.7–8.2)
RBC # BLD AUTO: 4.1 X10(6)UL
SODIUM SERPL-SCNC: 140 MMOL/L (ref 136–145)
TRIGL SERPL-MCNC: 83 MG/DL (ref 30–149)
TSI SER-ACNC: 2.33 UIU/ML (ref 0.55–4.78)
VLDLC SERPL CALC-MCNC: 14 MG/DL (ref 0–30)
WBC # BLD AUTO: 6.4 X10(3) UL (ref 4–11)

## 2025-01-08 PROCEDURE — 36415 COLL VENOUS BLD VENIPUNCTURE: CPT

## 2025-01-08 PROCEDURE — 85025 COMPLETE CBC W/AUTO DIFF WBC: CPT

## 2025-01-08 PROCEDURE — 80061 LIPID PANEL: CPT

## 2025-01-08 PROCEDURE — 83036 HEMOGLOBIN GLYCOSYLATED A1C: CPT

## 2025-01-08 PROCEDURE — 84443 ASSAY THYROID STIM HORMONE: CPT

## 2025-01-08 PROCEDURE — 80053 COMPREHEN METABOLIC PANEL: CPT

## 2025-01-13 DIAGNOSIS — N95.1 MENOPAUSAL AND FEMALE CLIMACTERIC STATES: ICD-10-CM

## 2025-01-14 RX ORDER — CITALOPRAM HYDROBROMIDE 20 MG/1
20 TABLET ORAL DAILY
Qty: 90 TABLET | Refills: 1 | Status: SHIPPED | OUTPATIENT
Start: 2025-01-14

## 2025-01-14 NOTE — TELEPHONE ENCOUNTER
Refill request for:    Requested Prescriptions     Pending Prescriptions Disp Refills    CITALOPRAM 20 MG Oral Tab [Pharmacy Med Name: Citalopram Hydrobromide 20 MG Oral Tablet] 90 tablet 3     Sig: TAKE 1 TABLET BY MOUTH DAILY        Last Prescribed Quantity Refills   11/19/24 90 1     LOV 11/28/23    Patient was asked to follow-up in: Follow-up not documented in note    Appointment scheduled: 1/16/2025 Rose Ngo DO    Medication not on protocol.     # 90 with 1 refills routed to Rose Ngo DO for review     No (0)

## 2025-01-16 ENCOUNTER — OFFICE VISIT (OUTPATIENT)
Dept: FAMILY MEDICINE CLINIC | Facility: CLINIC | Age: 70
End: 2025-01-16
Payer: MEDICARE

## 2025-01-16 VITALS
HEIGHT: 67.8 IN | TEMPERATURE: 98 F | SYSTOLIC BLOOD PRESSURE: 134 MMHG | BODY MASS INDEX: 24.28 KG/M2 | HEART RATE: 88 BPM | RESPIRATION RATE: 16 BRPM | WEIGHT: 158.38 LBS | DIASTOLIC BLOOD PRESSURE: 60 MMHG | OXYGEN SATURATION: 95 %

## 2025-01-16 DIAGNOSIS — I47.10 PSVT (PAROXYSMAL SUPRAVENTRICULAR TACHYCARDIA) (HCC): ICD-10-CM

## 2025-01-16 DIAGNOSIS — E78.00 PURE HYPERCHOLESTEROLEMIA: ICD-10-CM

## 2025-01-16 DIAGNOSIS — R91.8 PULMONARY NODULES: ICD-10-CM

## 2025-01-16 DIAGNOSIS — M85.89 OSTEOPENIA OF MULTIPLE SITES: ICD-10-CM

## 2025-01-16 DIAGNOSIS — R73.03 PREDIABETES: ICD-10-CM

## 2025-01-16 DIAGNOSIS — Z00.00 ENCOUNTER FOR ANNUAL HEALTH EXAMINATION: Primary | ICD-10-CM

## 2025-01-16 DIAGNOSIS — I25.10 CORONARY ARTERY DISEASE INVOLVING NATIVE CORONARY ARTERY OF NATIVE HEART WITHOUT ANGINA PECTORIS: ICD-10-CM

## 2025-01-16 DIAGNOSIS — I10 HTN (HYPERTENSION), BENIGN: ICD-10-CM

## 2025-01-16 DIAGNOSIS — J42 CHRONIC BRONCHITIS, UNSPECIFIED CHRONIC BRONCHITIS TYPE (HCC): ICD-10-CM

## 2025-01-16 PROCEDURE — G0439 PPPS, SUBSEQ VISIT: HCPCS | Performed by: FAMILY MEDICINE

## 2025-01-16 RX ORDER — AZELASTINE 1 MG/ML
SPRAY, METERED NASAL
COMMUNITY
Start: 2024-10-06

## 2025-01-16 RX ORDER — TRETINOIN 0.25 MG/G
CREAM TOPICAL
COMMUNITY
Start: 2024-10-03

## 2025-01-16 RX ORDER — ROSUVASTATIN CALCIUM 10 MG/1
10 TABLET, COATED ORAL NIGHTLY
Qty: 90 TABLET | Refills: 1 | Status: SHIPPED | OUTPATIENT
Start: 2025-01-16

## 2025-01-16 NOTE — PROGRESS NOTES
Subjective:   Kirsten Guajardo is a 69 year old female who presents for a Subsequent Annual Wellness visit (Pt already had Initial Annual Wellness) and scheduled follow up of multiple significant but stable problems.     Stomach issues have improved- now off PPI.     Complex renal cyst: Patient followed up with urology.  Pulses unlikely that this would be a malignancy.  Recommend repeating CT scan in 6 months and possibly consider ultrasound in 1 year.     Aortic stenosis with L arterial stenosis: Patient followed up with vascular surgery.  Given stenosis was not severe, no further follow-up was needed.  To continue her current medications.     CT chest showed 2 pulmonary nodules, 1 in right lower lobe which was 7 mm and one in left lower lobe which was 6 mm.  Has upcoming PET scan recommended by radiology      Chronic medical conditions:  HTN, SVT, CAD: Follows with cardiology (needs to find a new cardiologist)  On losartan and diltiazem  Of note had CT heart scan which had high calcium score. Stress test was positive. Had cath which shown non-obstructive disease (8/2020). Now on statin and ASA.  Denies Headaches, SOB, vision changes, chest pain and LE swelling.     HLD:  On rosuvastatin, tolerating well. Last LDL Above goal.     Hot flashes, menopausal:  Was on bio identical hormones- chest pain so stopped. Thinks she had a cath at that time.   Started on celexa. This is working well. Able to sleep through the night. Also helps with anxiety.   Denies SI/HI.    Health Maintenance:  Vaccines: reviewed as below. Indicated today: influenza- received  Immunization History   Administered Date(s) Administered    Covid-19 Vaccine Moderna 100 mcg/0.5 ml 02/17/2021, 03/18/2021, 10/22/2021    Covid-19 Vaccine Moderna 50 Mcg/0.25 Ml 04/01/2022    Covid-19 Vaccine Moderna Bivalent 50mcg/0.5mL 12+ years 09/07/2022, 04/24/2023    FLU VAC High Dose 65 YRS & Older PRSV Free (77987) 10/15/2020, 09/22/2021, 10/11/2022    FLULAVAL 6  months & older 0.5 ml Prefilled syringe (54733) 10/26/2017    FLUZONE 6 months and older PFS 0.5 ml ([id number]) 10/13/2014, 10/19/2015, 10/04/2016, 10/26/2017, 10/03/2018, 2019    Fluarix 6 Months And Older 0.5 ml prefilled syringe ([id number]) 2019    Fluvirin, 3 Years & >, Im 09/15/2010, 2012, 10/04/2013    Fluzone Vaccine Medicare () 10/15/2020    High Dose Fluzone Influenza Vaccine, 65yr+ PF 0.5mL ([id number]) 2021, 10/11/2022, 2023    Hpv Virus Vaccine 9 Elaine Im 2022, 2022, 2022    Influenza 10/13/2014, 10/19/2015, 10/04/2016    Influenza(Afluria)0.5ml QIV PFS 10/03/2018    Moderna Covid-19 Vaccine 50mcg/0.5ml 12yrs+ 2023    Pneumococcal Conjugate PCV20 2022    Pneumovax 23 2020    TDAP 2015    Zoster Vaccine Live (Zostavax) 2016    Zoster Vaccine Recombinant Adjuvanted (Shingrix) 2020, 2020, 2020     Obesity screening: Body mass index is 24.23 kg/m².  Diabetes screening:  elevated as above  Hypercholesterolemia screening:  Lab Results   Component Value Date    HDL 80 (H) 2025    HDL 80 (H) 2023    HDL 76 (H) 2022     Lab Results   Component Value Date     (H) 2025    LDL 96 2023    LDL 97 2022     Lab Results   Component Value Date    TRIG 83 2025    TRIG 75 2023    TRIG 71 2022        Depression screen: negative  Cervical Cancer screening: Last Pap: - abnormal ,follows with gyne   Colon Cancer screening: Last screenin  Breast Cancer screening: Last mammogram: 2024  Osteoporosis: Last DEXA: - osteopenia       History/Other:   Fall Risk Assessment:   She has been screened for Falls and is low risk.      Cognitive Assessment:   She had a completely normal cognitive assessment - see flowsheet entries     Functional Ability/Status:   Kirsten Guajardo has a completely normal functional assessment. See flowsheet for details.      Depression Screening  (PHQ):  PHQ-2 SCORE: 0  , done 1/16/2025   If you checked off any problems, how difficult have these problems made it for you to do your work, take care of things at home, or get along with other people?: Not difficult at all         Advanced Directives:   She does have a Living Will but we do NOT have it on file in Epic.    She does have a POA but we do NOT have it on file in [employer].    Patient has Advance Care Planning documents but we do not have a copy in EMR. Discussed Advanced Care Planning with patient and instructed patient to get our office a copy to be scanned into EMR.      Patient Active Problem List   Diagnosis    Neuropathy    HTN (hypertension), benign    HLD (hyperlipidemia)    PSVT (paroxysmal supraventricular tachycardia) (HCC)    ASCUS with positive high risk HPV cervical    BITA I (cervical intraepithelial neoplasia I)    Trigger ring finger of left hand    Preventative health care    Colon polyp    Abdominal pain, epigastric    Flatulence, eructation, and gas pain    Nausea    Papanicolaou smear of cervix with low grade squamous intraepithelial lesion (LGSIL)    Coronary artery disease involving native coronary artery of native heart without angina pectoris    Pulmonary nodules    Gastroesophageal reflux disease    Hypothyroidism    IBS (irritable bowel syndrome)    Mitral valve prolapse    STAR (obstructive sleep apnea)    Trochanteric bursitis    Hip pain, chronic, right    Chronic obstructive pulmonary disease, unspecified (HCC)    Recurrent cervical intraepithelial neoplasia    Sacroiliitis (HCC)    Allergic rhinitis     Allergies:  She is allergic to atenolol, beta adrenergic blockers, duloxetine, duloxetine hcl, penicillins, sulfa antibiotics, simvastatin, and lisinopril.    Current Medications:  Outpatient Medications Marked as Taking for the 1/16/25 encounter (Office Visit) with Rose Ngo, DO   Medication Sig    tretinoin 0.025 % External Cream TOPICALLY APPLY A PEA SIZED AMOUNT TO FACE  AT BEDTIME    azelastine 0.1 % Nasal Solution INHALE 1-2 SPRAYS IN EACH NOSTRIL TWICE DAILY AS NEEDED FOR CONGESTED OR RUNNY NOSE    rosuvastatin 10 MG Oral Tab Take 1 tablet (10 mg total) by mouth nightly.    citalopram 20 MG Oral Tab TAKE 1 TABLET BY MOUTH DAILY    dilTIAZem HCl ER Beads (TIADYLT ER) 180 MG Oral Capsule SR 24 Hr Take 1 capsule (180 mg total) by mouth 2 (two) times daily.    losartan 100 MG Oral Tab Take 1 tablet (100 mg total) by mouth daily.    aspirin 81 MG Oral Tab EC Take 1 tablet (81 mg total) by mouth daily.    Calcium Carb-Cholecalciferol (CALCIUM + D3) 600-200 MG-UNIT Oral Tab Take 1 tablet by mouth daily.      multivitamin Oral Tab Take 1 tablet by mouth daily.       Medical History:  She  has a past medical history of Abdominal distention, Abdominal pain, ALCOHOL USE, Allergic rhinitis, Anesthesia complication, Atherosclerosis of coronary artery, Belching, Bloating, Chest pain, Constipation, Diarrhea, unspecified, Essential hypertension, Flatulence/gas pain/belching, Heart palpitations, High cholesterol, History of cardiac murmur, HLD (hyperlipidemia), HTN (hypertension), Human papilloma virus infection (2017), Hyperlipidemia (12 years), Indigestion, Irregular bowel habits, Nausea, Night sweats, Paroxysmal SVT (supraventricular tachycardia) (HCC), PONV (postoperative nausea and vomiting), Sleep apnea (2012), Visual impairment, and Wears glasses.  Surgical History:  She  has a past surgical history that includes removal of coccyx; wisdom teeth removed; colonoscopy (03/09/2009); colonoscopy; egd; other; Eye surgery (Right); and other surgical history (Coccyx removed).   Family History:  Her family history includes Heart Attack in her mother; Heart Attack (age of onset: 75) in her father; Heart Disease in her mother; Heart Disorder in her father; Hypertension in her father and mother; No Known Problems in her sister; Stroke in her mother; rheumatoid arthritis in her mother.  Social  History:  She  reports that she quit smoking about 37 years ago. Her smoking use included cigarettes. She has a 30 pack-year smoking history. She has never used smokeless tobacco. She reports current alcohol use of about 1.0 standard drink of alcohol per week. She reports that she does not use drugs.    Tobacco:  She smoked tobacco in the past but quit greater than 12 months ago.  Social History     Tobacco Use   Smoking Status Former    Current packs/day: 0.00    Average packs/day: 2.0 packs/day for 15.0 years (30.0 ttl pk-yrs)    Types: Cigarettes    Quit date: 1987    Years since quittin.3   Smokeless Tobacco Never        CAGE Alcohol Screen:   CAGE screening score of 0 on 2025, showing low risk of alcohol abuse.      Patient Care Team:  Rose Ngo DO as PCP - General (Family Medicine)  Kaci Rich MD as Obstetrician (OBSTETRICS & GYNECOLOGY)  Linda Khan MD (SURGERY, ORTHOPEDIC)  Tony Weiss as Physical Therapist (Physical Therapy)  Miguelangel Benavides MD (Cardiac Electrophysiology)    Review of Systems     Negative except above    Objective:   Physical Exam  General Appearance:  Alert, cooperative, no distress, appears stated age   Head:  Normocephalic, without obvious abnormality, atraumatic   Eyes:  conjunctiva/corneas clear, EOM's intact both eyes   Ears:  Normal TM's and external ear canals, both ears   Throat: Lips, mucosa, and tongue normal; teeth and gums normal   Neck: Supple, symmetrical, trachea midline, no adenopathy;  thyroid: not enlarged, symmetric, no tenderness/mass/nodules; no carotid bruit or JVD   Lungs:   Clear to auscultation bilaterally, respirations unlabored   Heart:  Regular rate and rhythm, S1 and S2 normal, no murmur, rub, or gallop   Abdomen:   Soft, non-tender, bowel sounds active all four quadrants,  no masses, no organomegaly   Pelvic: Deferred   Extremities: Extremities normal, atraumatic, no cyanosis or edema   Neurologic: Grossly Normal         /60  (BP Location: Right arm)   Pulse 88   Temp 98.1 °F (36.7 °C) (Oral)   Resp 16   Ht 5' 7.8\" (1.722 m)   Wt 158 lb 6.4 oz (71.8 kg)   LMP  (LMP Unknown)   SpO2 95%   Breastfeeding No   BMI 24.23 kg/m²  Estimated body mass index is 24.23 kg/m² as calculated from the following:    Height as of this encounter: 5' 7.8\" (1.722 m).    Weight as of this encounter: 158 lb 6.4 oz (71.8 kg).    Medicare Hearing Assessment:   Hearing Screening    Time taken: 1/16/2025 10:09 AM  Screening Method: Whisper Test  Whisper Test Result: Pass         Visual Acuity:   Right Eye Visual Acuity: Corrected Right Eye Chart Acuity: 20/100   Left Eye Visual Acuity: Corrected Left Eye Chart Acuity: 20/30   Both Eyes Visual Acuity: Corrected Both Eyes Chart Acuity: 20/30   Able To Tolerate Visual Acuity: Yes        Assessment & Plan:   Kirsten Guajardo is a 69 year old female who presents for a Medicare Assessment.     1. Encounter for annual health examination (Primary)  2. Prediabetes  -    Discussed diagnosis, diet and exercise recommendations.  Will recheck Hemoglobin A1C; Future; Expected date: 01/16/2025 in 3 months  3. Coronary artery disease involving native coronary artery of native heart without angina pectoris  -   Medically optimized other than adjustment of statin as below.  Asymptomatic.  Continue follow-up with cardiology.   - Rosuvastatin Calcium; Take 1 tablet (10 mg total) by mouth nightly.  Dispense: 90 tablet; Refill: 1  4. Pure hypercholesterolemia  LDL above goal.  Increase to 10 mg rosuvastatin.  Repeat lipids in 3 months.  -     Rosuvastatin Calcium; Take 1 tablet (10 mg total) by mouth nightly.  Dispense: 90 tablet; Refill: 1  -     Lipid Panel; Future; Expected date: 01/16/2025  5. Osteopenia of multiple sites  -     Will update XR DEXA BONE DENSITOMETRY (CPT=77080); Future; Expected date: 01/16/2025  6. PSVT (paroxysmal supraventricular tachycardia) ([facility])  Controlled.  Continue current medications.  Continue  following cardiology.  7. Chronic bronchitis, unspecified chronic bronchitis type (HCC)  Stable off medications.  Continue to monitor.    8. HTN (hypertension), benign  Normotensive.  Continue current medications.  9. Pulmonary nodules  Continue follow-up with pulmonology.  Will follow-up on PET scan.    The patient indicates understanding of these issues and agrees to the plan.  Continue with current treatment plan.  Reinforced healthy diet, lifestyle, and exercise.    RTC in 1 year.     oRse Ngo DO, 1/16/2025     Supplementary Documentation:   General Health:  In the past six months, have you lost more than 10 pounds without trying?: 2 - No  Has your appetite been poor?: No  Type of Diet: Balanced  How does the patient maintain a good energy level?: Appropriate Exercise;Daily Walks;Stretching  How would you describe your daily physical activity?: Moderate  How would you describe your current health state?: Good  How do you maintain positive mental well-being?: Social Interaction;Visiting Family  On a scale of 0 to 10, with 0 being no pain and 10 being severe pain, what is your pain level?: (Patient-Rptd) 0 - (None)  In the past six months, have you experienced urine leakage?: 0-No  At any time do you feel concerned for the safety/well-being of yourself and/or your children, in your home or elsewhere?: No  Have you had any immunizations at another office such as Influenza, Hepatitis B, Tetanus, or Pneumococcal?: Yes    Health Maintenance   Topic Date Due    COVID-19 Vaccine (8 - 2024-25 season) 09/01/2024    Influenza Vaccine (1) 10/01/2024    Annual Physical  11/28/2024    Annual Depression Screening  01/01/2025    Mammogram  02/01/2025    Pap Smear  11/07/2025    Colorectal Cancer Screening  03/06/2028    DEXA Scan  Completed    Fall Risk Screening (Annual)  Completed    Pneumococcal Vaccine: 50+ Years  Completed    Zoster Vaccines  Completed    Meningococcal B Vaccine  Aged Out

## 2025-01-21 ENCOUNTER — HOSPITAL ENCOUNTER (OUTPATIENT)
Dept: BONE DENSITY | Age: 70
Discharge: HOME | End: 2025-01-21
Attending: FAMILY MEDICINE
Payer: MEDICARE

## 2025-01-21 DIAGNOSIS — M85.89 OSTEOPENIA OF MULTIPLE SITES: ICD-10-CM

## 2025-01-21 PROCEDURE — 77080 DXA BONE DENSITY AXIAL: CPT | Performed by: FAMILY MEDICINE

## 2025-01-22 ENCOUNTER — HOSPITAL ENCOUNTER (OUTPATIENT)
Dept: NUCLEAR MEDICINE | Facility: HOSPITAL | Age: 70
Discharge: HOME | End: 2025-01-22
Attending: INTERNAL MEDICINE
Payer: MEDICARE

## 2025-01-22 DIAGNOSIS — R91.8 PULMONARY NODULES: ICD-10-CM

## 2025-01-22 LAB — GLUCOSE BLD-MCNC: 110 MG/DL (ref 70–99)

## 2025-01-22 PROCEDURE — 82962 GLUCOSE BLOOD TEST: CPT

## 2025-01-22 PROCEDURE — 78815 PET IMAGE W/CT SKULL-THIGH: CPT | Performed by: INTERNAL MEDICINE

## 2025-01-23 ENCOUNTER — TELEPHONE (OUTPATIENT)
Dept: FAMILY MEDICINE CLINIC | Facility: CLINIC | Age: 70
End: 2025-01-23

## 2025-01-23 NOTE — TELEPHONE ENCOUNTER
Pt is calling because [facility] - [place], [place] - [address] [phone], [phone] [[id number]]     Is having issues filling her   rosuvastatin 10 MG Oral Tab

## 2025-01-24 ENCOUNTER — TELEPHONE (OUTPATIENT)
Facility: CLINIC | Age: 70
End: 2025-01-24

## 2025-01-24 NOTE — TELEPHONE ENCOUNTER
Left Recorded message to review results of PET CT.       ----- Message from Vish Briggs sent at 1/23/2025  9:45 PM CST -----  PET scan     CONCLUSION:    1. No abnormal uptake involves lower lobe subcentimeter pulmonary nodules.  These can be followed with CT as clinically indicated.    2. Slight increased uptake involves the T5 vertebral body of uncertain clinical significance     Will discuss further on future office visit        Vish Briggs MD

## 2025-01-24 NOTE — TELEPHONE ENCOUNTER
Reviewed with Patient PET scan results with patient.  Will follow up with Dr. Briggs and clarify when her next scan would be.

## 2025-02-24 NOTE — PROGRESS NOTES
[facility]  SLEEP PROGRESS NOTE        HPI:   This is a 69 year old female coming in for   Chief Complaint   Patient presents with    Obstructive Sleep Apnea (STAR)     Sleep follow up        HPI:     No questions/concerns today    In bed 6-7p, reads and watches TV in bed for a couple hours  Out of bed 245-4a  SL quick   Nighttime awakenings 1x to use RR, no trouble falling back asleep  Naps lays down, does not always fall asleep  Caffeine none    Denies leg cramps, restless legs, headaches, dry mouth, tinnitus, chest pain, thoracic back pain, bloating, drowsy driving, sleep walking, sleep talking  Teeth grinding - wearing guard     DME company: [employer]   Mask type: nasal pillows - Opus    ANGELA MEJIA  2025 - 2025  Patient ID: [id number]  : 10/13/1955  Age: 69 years  [facility]  Compliance Report  Usage 2025 - 2025  Usage days 30/30 days (100%)  >= 4 hours 30 days (100%)  < 4 hours 0 days (0%)  Usage hours 257 hours 57 minutes  Average usage (total days) 8 hours 36 minutes  Average usage (days used) 8 hours 36 minutes  Median usage (days used) 8 hours 44 minutes  Total used hours (value since last reset - 2025) 12,633 hours  AirSense 10 AutoSet  Serial number [id number]  Mode AutoSet  Min Pressure 7 cmH2O  Max Pressure 13 cmH2O  EPR Fulltime  EPR level 3  Response Standard  Therapy  Pressure - cmH2O Median: 7.6 95th percentile: 8.9 Maximum: 9.7  Leaks - L/min Median: 0.1 95th percentile: 9.7 Maximum: 88.4  Events per hour AI: 1.0 HI: 0.0 AHI: 1.0  Apnea Index Central: 0.4 Obstructive: 0.4 Unknown: 0.2  RERA Index 0.0  Cheyne-Cervantes respiration (average duration per night) 0 minutes (0%)    Patient: Sleep review of systems today: see form.      10/15/2019 PSG  IMPRESSION: This polysomnogram documents mild obstructive sleep apnea which is more pronounced during REM sleep. The overall AHI was 7.8, REM AHI of 13.1, and an SaO2 sena of 89%. Mild snoring was noted during  the study.       Pt  PCP:  Rose Ngo DO  No referring provider defined for this encounter.           No data to display                    Past Medical History:    Abdominal distention    Abdominal pain    ALCOHOL USE    Allergic rhinitis    Not sure    Anesthesia complication    Atherosclerosis of coronary artery    Belching    Bloating    Chest pain    Constipation    Diarrhea, unspecified    Essential hypertension    Flatulence/gas pain/belching    Heart palpitations    High cholesterol    History of cardiac murmur    HLD (hyperlipidemia)    HTN (hypertension)    Human papilloma virus infection    Hyperlipidemia    Indigestion    Irregular bowel habits    Nausea    Night sweats    Paroxysmal SVT (supraventricular tachycardia) (HCC)    PONV (postoperative nausea and vomiting)    Sleep apnea    uses CPAP    Visual impairment    contacts and glasses    Wears glasses     Past Surgical History:   Procedure Laterality Date    Colonoscopy  2009    Colonoscopy      [facility]      Eye surgery Right     eyelid    Other      LEX and right hip injection    Other surgical history  Coccyx removed    Removal of coccyx      Laporte teeth removed       Social History:  Social History     Social History Narrative    Not on file     Social History     Socioeconomic History    Marital status:    Occupational History    Occupation: RN     Comment: retired   Tobacco Use    Smoking status: Former     Current packs/day: 0.00     Average packs/day: 2.0 packs/day for 15.0 years (30.0 ttl pk-yrs)     Types: Cigarettes     Quit date: 1987     Years since quittin.4    Smokeless tobacco: Never   Vaping Use    Vaping status: Never Used   Substance and Sexual Activity    Alcohol use: Yes     Alcohol/week: 1.0 standard drink of alcohol     Types: 1 Glasses of wine per week     Comment: soc    Drug use: Never    Sexual activity: Yes     Partners: Male   Other Topics Concern    Caffeine Concern No    Exercise Yes     Comment:  yoga/swimming twice weekly    Seat Belt Yes    Self-Exams No     Comment: Every 3 months     Family History:  Family History   Problem Relation Age of Onset    Heart Disease Mother     Hypertension Mother     Heart Attack Mother     Stroke Mother     Other (rheumatoid arthritis) Mother     Hypertension Father     Heart Attack Father 75    Heart Disorder Father     No Known Problems Sister      Allergies:  Allergies[1]  Current Meds:  Current Outpatient Medications   Medication Sig Dispense Refill    tretinoin 0.025 % External Cream TOPICALLY APPLY A PEA SIZED AMOUNT TO FACE AT BEDTIME      azelastine 0.1 % Nasal Solution INHALE 1-2 SPRAYS IN EACH NOSTRIL TWICE DAILY AS NEEDED FOR CONGESTED OR RUNNY NOSE      rosuvastatin 10 MG Oral Tab Take 1 tablet (10 mg total) by mouth nightly. 90 tablet 1    citalopram 20 MG Oral Tab TAKE 1 TABLET BY MOUTH DAILY 90 tablet 1    dilTIAZem HCl ER Beads (TIADYLT ER) 180 MG Oral Capsule SR 24 Hr Take 1 capsule (180 mg total) by mouth 2 (two) times daily. 180 capsule 3    losartan 100 MG Oral Tab Take 1 tablet (100 mg total) by mouth daily. 90 tablet 1    aspirin 81 MG Oral Tab EC Take 1 tablet (81 mg total) by mouth daily.      Calcium Carb-Cholecalciferol (CALCIUM + D3) 600-200 MG-UNIT Oral Tab Take 1 tablet by mouth daily.        multivitamin Oral Tab Take 1 tablet by mouth daily.        Counseling given: Not Answered         Problem List:  Patient Active Problem List   Diagnosis    Neuropathy    HTN (hypertension), benign    HLD (hyperlipidemia)    PSVT (paroxysmal supraventricular tachycardia) (HCC)    ASCUS with positive high risk HPV cervical    BITA I (cervical intraepithelial neoplasia I)    Trigger ring finger of left hand    Preventative health care    Colon polyp    Abdominal pain, epigastric    Flatulence, eructation, and gas pain    Nausea    Papanicolaou smear of cervix with low grade squamous intraepithelial lesion (LGSIL)    Coronary artery disease involving native  coronary artery of native heart without angina pectoris    Pulmonary nodules    Gastroesophageal reflux disease    Hypothyroidism    IBS (irritable bowel syndrome)    Mitral valve prolapse    STAR (obstructive sleep apnea)- mild, AHI 7.8    Trochanteric bursitis    Hip pain, chronic, right    Chronic obstructive pulmonary disease, unspecified (HCC)    Recurrent cervical intraepithelial neoplasia    Sacroiliitis    Allergic rhinitis       REVIEW OF SYSTEMS:   Review of Systems  See HPI     EXAM:   /66 (BP Location: Right arm, Patient Position: Sitting, Cuff Size: adult)   Pulse 85   Resp 16   Ht 5' 8\" (1.727 m)   Wt 156 lb (70.8 kg)   LMP  (LMP Unknown)   SpO2 95%   BMI 23.72 kg/m²  Estimated body mass index is 23.72 kg/m² as calculated from the following:    Height as of this encounter: 5' 8\" (1.727 m).    Weight as of this encounter: 156 lb (70.8 kg).   Neck in inches:      Wt Readings from Last 6 Encounters:   02/25/25 156 lb (70.8 kg)   01/16/25 158 lb 6.4 oz (71.8 kg)   11/07/24 164 lb (74.4 kg)   09/03/24 160 lb (72.6 kg)   06/06/24 160 lb (72.6 kg)   11/28/23 160 lb (72.6 kg)     BP Readings from Last 3 Encounters:   02/25/25 122/66   01/16/25 134/60   11/07/24 130/82     Pulse Readings from Last 3 Encounters:   02/25/25 85   01/16/25 88   11/07/24 119     SpO2 Readings from Last 3 Encounters:   02/25/25 95%   01/16/25 95%   09/03/24 99%      Patient weight not recorded    Vital signs reviewed.  Physical Exam  Vitals and nursing note reviewed.   Constitutional:       Appearance: Normal appearance.   HENT:      Head: Normocephalic and atraumatic.      Right Ear: External ear normal.      Left Ear: External ear normal.   Pulmonary:      Effort: Pulmonary effort is normal. No respiratory distress.   Musculoskeletal:      Cervical back: Normal range of motion and neck supple.   Neurological:      General: No focal deficit present.      Mental Status: She is alert and oriented to person, place, and  time.   Psychiatric:         Attention and Perception: Attention and perception normal.         Mood and Affect: Mood and affect normal.         Speech: Speech normal.         Behavior: Behavior normal. Behavior is cooperative.         Thought Content: Thought content normal.         Cognition and Memory: Cognition and memory normal.         Judgment: Judgment normal.         ASSESSMENT AND PLAN:   1. STAR (obstructive sleep apnea)- mild, AHI 7.8  - Apnea is well controlled with current pressures 7-13cwp  - RTO in 6 months for pulm follow up    Patient is using and benefiting from regular cpap use.  Patient was instructed to clean equipment on a weekly basis.  Patient was instructed to keep up to date with supplies.  Patient was informed to avoid drowsy driving, or using heavy machinery.    2. HTN (hypertension), benign  - /66    3. Coronary artery disease involving native coronary artery of native heart without angina pectoris    Patient Instructions   CPAP/BiPAP Instructions:    Please be advised:   Do not drive while sleepy   Take CPAP/BiPAP machine to any procedure that requires sedation     When should I clean my machine & supplies?   Clean mask cushions or nasal pillow, headgear, tubing, and humidifier chamber with mild soap (Dorothy) and water   If water chamber has hard water buildup (white crust), soak in warm water & vinegar mix 50/50.   Rinse and hang dry     DAILY   Wipe mask cushions or nasal pillow   Empty & rinse water chamber- refill with distilled water     WEEKLY   Clean mask cushions or nasal pillow, headgear, tubing, and humidifier chamber with mild soap (Dorothy) and water   If water chamber has hard water buildup (white crust), soak in warm water & vinegar mix 50/50.   Rinse and hang dry     When should supplies be replaced?   Contact your home care company for replacement supplies, or if your machine is malfunctioning   *Below is a general guideline of what we recommend. Replacement of supplies  differs depending on your insurance company*   MONTHLY: Replace filter and mask cushion   6 MONTHS: Replace headgear and tubing     Travel Tips   Keep CPAP/BiPAP in original bag when traveling, and place luggage tag on bag   Most airlines consider CPAP/BiPAP to be a medical device, therefore it is a free carry-on item   If unable to get distilled water, bottled water is safe while traveling. DO NOT use tap water   When traveling outside the [place] only a power adapter is necessary (CPAP can operate without a converter), bring an extension cord   Consider purchasing or renting a travel CPAP (not covered by insurance)     Dry Mouth/Nose   Turn up the humidity on your machine (select \"Options\" from the home screen)   Place a cool mist humidifier at your bedside   Over-the-counter remedies: Biotene, XyliMelts, NasoGel     Air Leak   Try adjusting your mask/headgear while laying in sleeping position vs. sitting up   Wash and dry your face prior to putting your mask on   If applicable: shave facial hair at night (or before wearing CPAP)   Purchase \"RemZzzs\" (through [employer], or [employer])   100% cotton knit barrier that goes between your mask cushion and your skin   Replace your mask cushion (at least once per month) and/or headgear (every 3-6 months)     Nasal Congestion   CPAP therapy can cause nasal passages to dry out, & mucous membranes try to protect the nasal passages by producing excess mucous, so congestion results.   Over-the counter remedies: Flonase, Nasacort, Sinus Rinses (Neti-Pot), DO NOT USE Afrin   Try a mask that goes over the nose and mouth     Skin Irritation   Clean supplies regularly (Citrus II Mask Wipes, Control III disinfectant solution)   Try over the counter creams such as hydrocortisone 1% (apply in the morning after showering)   Your headgear may be too tight, replace supplies so you don't need to adjust so tightly   Try RemZzzs (100% cotton knit barrier that goes between your  mask cushion and your skin)     Gas/Bloating   Try a different sleeping position to keep air out of the stomach. Lay on the left side or rotate to the right side. Incline with pillows or lay flat.   Over-the-counter remedies: Simethicone            Independent interpretation of Sleep Download as defined above.  Continue with Rx management of Sleep apnea with PAP therapy.    COMPLIANCE is required by insurance for 4 hours a night most nights of the week.    Advised if still with sleep apnea and not using CPAP has a 7 fold increase in risk of heart attack, stroke, abnormal heart rhythm  and death,  increased risk of driving accidents.     Advised to refrain from driving when sleepy.      Recommend weight loss, and maintain and optimal BMI with Exercise 30 minutes most days to target heart rate .     Advised patient to change filters,masks,hoses  and tubes and equiptment on a  regular schedule.    Filters and seals shall be changed every 1 month,  Hoses every 3 months,   CPAP mask and humidifier chamber changed every 6 month  with the durable medical equipment provider.         Meds & Refills for this Visit:  Requested Prescriptions      No prescriptions requested or ordered in this encounter       Outcome: Parent verbalizes understanding. Parent is notified to call with any questions, complications, allergies, or worsening or changing symptoms.  Parent is to call with any side effects or complications from the treatments as a result of today.     \" This note was created utilizing Dragon speech recognition software.  Please excuse any grammatical errors. Call my office if you have any questions regarding this note. \"     Sloane HENDERSON MA  2/24/2025  2:18 PM         [1]   Allergies  Allergen Reactions    Atenolol HIVES    Beta Adrenergic Blockers HIVES     Atenolo, Toprol.    Duloxetine OTHER (SEE COMMENTS)    Duloxetine Hcl PALPITATIONS and OTHER (SEE COMMENTS)     Other reaction(s): Other (See Comments)    Penicillins  HIVES and RASH    Sulfa Antibiotics SWELLING and OTHER (SEE COMMENTS)     Other reaction(s): Other (See Comments)    Severe joint pain    Other reaction(s): Swelling    Simvastatin CONFUSION     Other reaction(s): Altered Mental Status    Lisinopril Coughing

## 2025-02-25 ENCOUNTER — OFFICE VISIT (OUTPATIENT)
Facility: CLINIC | Age: 70
End: 2025-02-25
Payer: MEDICARE

## 2025-02-25 VITALS
HEART RATE: 85 BPM | HEIGHT: 68 IN | WEIGHT: 156 LBS | RESPIRATION RATE: 16 BRPM | BODY MASS INDEX: 23.64 KG/M2 | SYSTOLIC BLOOD PRESSURE: 122 MMHG | DIASTOLIC BLOOD PRESSURE: 66 MMHG | OXYGEN SATURATION: 95 %

## 2025-02-25 DIAGNOSIS — I25.10 CORONARY ARTERY DISEASE INVOLVING NATIVE CORONARY ARTERY OF NATIVE HEART WITHOUT ANGINA PECTORIS: ICD-10-CM

## 2025-02-25 DIAGNOSIS — G47.33 OSA (OBSTRUCTIVE SLEEP APNEA): Primary | ICD-10-CM

## 2025-02-25 DIAGNOSIS — I10 HTN (HYPERTENSION), BENIGN: ICD-10-CM

## 2025-02-25 PROCEDURE — 99214 OFFICE O/P EST MOD 30 MIN: CPT | Performed by: PHYSICIAN ASSISTANT

## 2025-02-25 NOTE — PATIENT INSTRUCTIONS
CPAP/BiPAP Instructions:    Please be advised:   Do not drive while sleepy   Take CPAP/BiPAP machine to any procedure that requires sedation     When should I clean my machine & supplies?   Clean mask cushions or nasal pillow, headgear, tubing, and humidifier chamber with mild soap (Dorothy) and water   If water chamber has hard water buildup (white crust), soak in warm water & vinegar mix 50/50.   Rinse and hang dry     DAILY   Wipe mask cushions or nasal pillow   Empty & rinse water chamber- refill with distilled water     WEEKLY   Clean mask cushions or nasal pillow, headgear, tubing, and humidifier chamber with mild soap (Dorothy) and water   If water chamber has hard water buildup (white crust), soak in warm water & vinegar mix 50/50.   Rinse and hang dry     When should supplies be replaced?   Contact your home care company for replacement supplies, or if your machine is malfunctioning   *Below is a general guideline of what we recommend. Replacement of supplies differs depending on your insurance company*   MONTHLY: Replace filter and mask cushion   6 MONTHS: Replace headgear and tubing     Travel Tips   Keep CPAP/BiPAP in original bag when traveling, and place luggage tag on bag   Most airlines consider CPAP/BiPAP to be a medical device, therefore it is a free carry-on item   If unable to get distilled water, bottled water is safe while traveling. DO NOT use tap water   When traveling outside the [place] only a power adapter is necessary (CPAP can operate without a converter), bring an extension cord   Consider purchasing or renting a travel CPAP (not covered by insurance)     Dry Mouth/Nose   Turn up the humidity on your machine (select \"Options\" from the home screen)   Place a cool mist humidifier at your bedside   Over-the-counter remedies: Biotene, XyliMelts, NasoGel     Air Leak   Try adjusting your mask/headgear while laying in sleeping position vs. sitting up   Wash and dry your face prior to putting your  mask on   If applicable: shave facial hair at night (or before wearing CPAP)   Purchase \"RemZzzs\" (through home care co., [email or web address], or remzzzs[email or web address])   100% cotton knit barrier that goes between your mask cushion and your skin   Replace your mask cushion (at least once per month) and/or headgear (every 3-6 months)     Nasal Congestion   CPAP therapy can cause nasal passages to dry out, & mucous membranes try to protect the nasal passages by producing excess mucous, so congestion results.   Over-the counter remedies: Flonase, Nasacort, Sinus Rinses (Neti-Pot), DO NOT USE Afrin   Try a mask that goes over the nose and mouth     Skin Irritation   Clean supplies regularly (Citrus II Mask Wipes, Control III disinfectant solution)   Try over the counter creams such as hydrocortisone 1% (apply in the morning after showering)   Your headgear may be too tight, replace supplies so you don't need to adjust so tightly   Try RemZzzs (100% cotton knit barrier that goes between your mask cushion and your skin)     Gas/Bloating   Try a different sleeping position to keep air out of the stomach. Lay on the left side or rotate to the right side. Incline with pillows or lay flat.   Over-the-counter remedies: Simethicone

## 2025-03-07 ENCOUNTER — HOSPITAL ENCOUNTER (OUTPATIENT)
Dept: MAMMOGRAPHY | Facility: HOSPITAL | Age: 70
Discharge: HOME | End: 2025-03-07
Attending: OBSTETRICS & GYNECOLOGY
Payer: MEDICARE

## 2025-03-07 DIAGNOSIS — N64.59 OTHER SIGNS AND SYMPTOMS IN BREAST: ICD-10-CM

## 2025-03-07 DIAGNOSIS — R92.30 DENSE BREAST TISSUE ON MAMMOGRAM, UNSPECIFIED TYPE: Primary | ICD-10-CM

## 2025-03-07 DIAGNOSIS — Z12.39 ENCOUNTER FOR BREAST CANCER SCREENING USING NON-MAMMOGRAM MODALITY: ICD-10-CM

## 2025-03-07 DIAGNOSIS — Z12.31 ENCOUNTER FOR SCREENING MAMMOGRAM FOR MALIGNANT NEOPLASM OF BREAST: ICD-10-CM

## 2025-03-07 PROCEDURE — 77063 BREAST TOMOSYNTHESIS BI: CPT | Performed by: OBSTETRICS & GYNECOLOGY

## 2025-03-07 PROCEDURE — 77067 SCR MAMMO BI INCL CAD: CPT | Performed by: OBSTETRICS & GYNECOLOGY

## 2025-03-28 ENCOUNTER — HOSPITAL ENCOUNTER (OUTPATIENT)
Dept: MAMMOGRAPHY | Facility: HOSPITAL | Age: 70
Discharge: HOME | End: 2025-03-28
Attending: OBSTETRICS & GYNECOLOGY
Payer: MEDICARE

## 2025-03-28 DIAGNOSIS — R92.2 INCONCLUSIVE MAMMOGRAM: ICD-10-CM

## 2025-03-28 PROCEDURE — 77061 BREAST TOMOSYNTHESIS UNI: CPT | Performed by: OBSTETRICS & GYNECOLOGY

## 2025-03-28 PROCEDURE — 76642 ULTRASOUND BREAST LIMITED: CPT | Performed by: OBSTETRICS & GYNECOLOGY

## 2025-03-28 PROCEDURE — 77065 DX MAMMO INCL CAD UNI: CPT | Performed by: OBSTETRICS & GYNECOLOGY

## 2025-03-28 NOTE — IMAGING NOTE
This Breast Care RN assisted Dr. Wayne with recommendation for a left breast 1 site stereotactic guided and left breast 1 site ultrasound guided( 2 sites total) biopsy for calcifications and mass. Procedure reviewed and all questions answered. Emotional and educational support given. On the day of the biopsy, pt instructed to take Tylenol 1000mg PO, eat a light meal & bring or wear a sports bra.  Post biopsy care also reviewed with pt to include NO lifting more than 5lbs, no exercising or housework (limit upper body movement) for 24-48 hrs post biopsy. Patient denies blood thinners, bleeding disorders, liver disease and chemo. Pt verbalized understanding. Patient provided with appointment on Wednesday April 2 at 0830, arrival at 0800 and confirmed with scheduling.

## 2025-04-02 ENCOUNTER — HOSPITAL ENCOUNTER (OUTPATIENT)
Dept: MAMMOGRAPHY | Facility: HOSPITAL | Age: 70
Discharge: HOME | End: 2025-04-02
Attending: OBSTETRICS & GYNECOLOGY
Payer: MEDICARE

## 2025-04-02 DIAGNOSIS — R92.8 ABNORMAL FINDINGS ON DIAGNOSTIC IMAGING OF BREAST: ICD-10-CM

## 2025-04-02 DIAGNOSIS — N63.0 BREAST MASS: ICD-10-CM

## 2025-04-02 DIAGNOSIS — R92.1 BREAST CALCIFICATIONS: ICD-10-CM

## 2025-04-02 PROCEDURE — 77065 DX MAMMO INCL CAD UNI: CPT | Performed by: OBSTETRICS & GYNECOLOGY

## 2025-04-02 PROCEDURE — 19081 BX BREAST 1ST LESION STRTCTC: CPT | Performed by: OBSTETRICS & GYNECOLOGY

## 2025-04-02 PROCEDURE — 19083 BX BREAST 1ST LESION US IMAG: CPT | Performed by: OBSTETRICS & GYNECOLOGY

## 2025-04-02 PROCEDURE — 88305 TISSUE EXAM BY PATHOLOGIST: CPT | Performed by: OBSTETRICS & GYNECOLOGY

## 2025-04-02 NOTE — DISCHARGE INSTRUCTIONS
Discharge Instructions  Stereotactic / Ultrasound / MRI Core Biopsy     Place an ice pack on top of the biopsy site in your bra OR the folds of the Ace wrap for 10-15 minutes every hour until bedtime for your comfort and to decrease bleeding.     Keep your supportive bra OR the Ace wrap in place for 24 hours after your biopsy. This compression decreases bleeding and breast movement for your comfort. Wear a supportive bra for the next couple days for comfort (as well as for sleeping)     No bath or shower during the first 24 hours after biopsy. After this time, you may take a bath or shower. It’s okay if the steri-strips get wet but don’t soak them.   No saunas, hot tubs, or swimming pools until the steri-strips fall off (5-7days).  This prevents infection and allows time for the area to completely close and heal.     DO NOT remove the steri-strips. They will fall off in 5 days to two weeks. If any type of irritation (redness, itching, OR blisters) develops in the area around the steri-strips, remove them gently. Keep the area clean and dry.     It is normal to have mild discomfort and bruising at the biopsy site.      You may take Tylenol as needed for discomfort.     DO NOT participate in strenuous activity (aerobics, heavy lifting, housework, gardening, etc.) 48 hours after your biopsy to prevent bleeding.     You will receive results typically within 2-3 business days. Occasionally, the specimen is sent off-site for a 2nd opinion. You will be notified when this occurs as this will delay your results.     If you have any questions about the procedure or your results, please contact the Breast Care Coordinator Nurse at [phone]. (M-F 7:30-4)    If you are having a MRI Breast Biopsy or an Ultrasound guided biopsy, you will be billed for the biopsy and a unilateral mammogram separately.    A 6-month or one year follow-up Diagnostic Mammogram/Ultrasound will be recommended to document stability of the biopsy  site. It may be scheduled at [facility] or [facility] by calling [phone]. You will need an order for this exam from your referring physician.       5/2024

## 2025-04-03 ENCOUNTER — TELEPHONE (OUTPATIENT)
Dept: GENERAL RADIOLOGY | Facility: HOSPITAL | Age: 70
End: 2025-04-03

## 2025-04-03 NOTE — IMAGING NOTE
1143: Spoke with Kirsten Guajardo post left breast biopsy- two sites..  Introduced myself as one of the breast nurse coordinators at [facility] and informed Ms. Guajardo of the purpose of my call.  Name and date of birth verified by patient.    Kirsten Guajardo confirmed awareness of breast pathology results-MyChart notification.    Pathology results and recommendations discussed as follows: benign findings  Final Diagnosis:   A.  Left breast calcifications 10:00, stereotactic 9 gauge needle core biopsies:  -2 portions of fibroadenomatous mastopathy with irregular thick calcifications.  -Background breast tissue with increased mammary stromal fibrosis.  -No evidence of atypia or malignancy in the submitted material.     B.  Left breast 12:00 mass, ultrasound-guided 12-gauge needle core biopsies:  -Focal fat necrosis in a background of fibrocystic change with increased stromal fibrosis.  -No evidence of atypia or malignancy in the submitted material.   See EMR for complete pathology report    Concordance verified by Dr. Marr  Recommendation-six month follow up     Kirsten Guajardo verbalized understanding and agreement to the above.    Reinforced post biopsy care and instruction.  Kirsten Guajardo denies any issues with biopsy site- bleeding, drainage, redness, tenderness.     Ms. Guajardo instructed to perform breast self-exams and notify physician of any changes/concerns. Kirsten Guajardo verbalized agreement.

## 2025-04-16 ENCOUNTER — LAB ENCOUNTER (OUTPATIENT)
Dept: LAB | Facility: HOSPITAL | Age: 70
End: 2025-04-16
Attending: FAMILY MEDICINE
Payer: MEDICARE

## 2025-04-16 DIAGNOSIS — E78.00 PURE HYPERCHOLESTEROLEMIA: ICD-10-CM

## 2025-04-16 DIAGNOSIS — R73.03 PREDIABETES: ICD-10-CM

## 2025-04-16 LAB
CHOLEST SERPL-MCNC: 159 MG/DL (ref ?–200)
EST. AVERAGE GLUCOSE BLD GHB EST-MCNC: 117 MG/DL (ref 68–126)
FASTING PATIENT LIPID ANSWER: YES
HBA1C MFR BLD: 5.7 % (ref ?–5.7)
HDLC SERPL-MCNC: 72 MG/DL (ref 40–59)
LDLC SERPL CALC-MCNC: 76 MG/DL (ref ?–100)
NONHDLC SERPL-MCNC: 87 MG/DL (ref ?–130)
TRIGL SERPL-MCNC: 51 MG/DL (ref 30–149)
VLDLC SERPL CALC-MCNC: 8 MG/DL (ref 0–30)

## 2025-04-16 PROCEDURE — 36415 COLL VENOUS BLD VENIPUNCTURE: CPT

## 2025-04-16 PROCEDURE — 83036 HEMOGLOBIN GLYCOSYLATED A1C: CPT

## 2025-04-16 PROCEDURE — 80061 LIPID PANEL: CPT

## 2025-04-21 ENCOUNTER — OFFICE VISIT (OUTPATIENT)
Dept: FAMILY MEDICINE CLINIC | Facility: CLINIC | Age: 70
End: 2025-04-21
Payer: MEDICARE

## 2025-04-21 ENCOUNTER — TELEPHONE (OUTPATIENT)
Dept: FAMILY MEDICINE CLINIC | Facility: CLINIC | Age: 70
End: 2025-04-21

## 2025-04-21 VITALS
OXYGEN SATURATION: 95 % | HEART RATE: 76 BPM | TEMPERATURE: 98 F | RESPIRATION RATE: 16 BRPM | SYSTOLIC BLOOD PRESSURE: 130 MMHG | DIASTOLIC BLOOD PRESSURE: 68 MMHG | BODY MASS INDEX: 23.64 KG/M2 | WEIGHT: 156 LBS | HEIGHT: 67.99 IN

## 2025-04-21 DIAGNOSIS — N30.01 ACUTE CYSTITIS WITH HEMATURIA: Primary | ICD-10-CM

## 2025-04-21 DIAGNOSIS — R73.03 PREDIABETES: ICD-10-CM

## 2025-04-21 LAB
APPEARANCE: CLEAR
BILIRUBIN: NEGATIVE
GLUCOSE (URINE DIPSTICK): NEGATIVE MG/DL
KETONES (URINE DIPSTICK): NEGATIVE MG/DL
MULTISTIX LOT#: ABNORMAL NUMERIC
NITRITE, URINE: NEGATIVE
PH, URINE: 8 (ref 4.5–8)
PROTEIN (URINE DIPSTICK): 30 MG/DL
SPECIFIC GRAVITY: 1.02 (ref 1–1.03)
URINE-COLOR: YELLOW
UROBILINOGEN,SEMI-QN: 0.2 MG/DL (ref 0–1.9)

## 2025-04-21 PROCEDURE — 99214 OFFICE O/P EST MOD 30 MIN: CPT | Performed by: FAMILY MEDICINE

## 2025-04-21 PROCEDURE — 81003 URINALYSIS AUTO W/O SCOPE: CPT | Performed by: FAMILY MEDICINE

## 2025-04-21 PROCEDURE — 87086 URINE CULTURE/COLONY COUNT: CPT | Performed by: FAMILY MEDICINE

## 2025-04-21 RX ORDER — DILTIAZEM HYDROCHLORIDE 180 MG/1
180 CAPSULE, COATED, EXTENDED RELEASE ORAL 2 TIMES DAILY
COMMUNITY
Start: 2025-02-08

## 2025-04-21 RX ORDER — NITROFURANTOIN 25; 75 MG/1; MG/1
100 CAPSULE ORAL 2 TIMES DAILY
Qty: 10 CAPSULE | Refills: 0 | Status: SHIPPED | OUTPATIENT
Start: 2025-04-21

## 2025-04-21 NOTE — PROGRESS NOTES
The following individual(s) verbally consented to be recorded using ambient AI listening technology and understand that they can each withdraw their consent to this listening technology at any point by asking the clinician to turn off or pause the recording:    Patient name: Kirsten GARCIA Bennett

## 2025-04-21 NOTE — TELEPHONE ENCOUNTER
Pt is calling she sent a my chart message too she does wish to take Metformin as discussed today with Dr. Ngo during appointment. Please send to [facility].

## 2025-04-22 RX ORDER — METFORMIN HYDROCHLORIDE 500 MG/1
500 TABLET, EXTENDED RELEASE ORAL
Qty: 90 TABLET | Refills: 0 | Status: SHIPPED | OUTPATIENT
Start: 2025-04-22

## 2025-04-23 NOTE — PROGRESS NOTES
Chief Complaint:  Chief Complaint   Patient presents with    UTI     UTI symptoms. Burning sensation and urgency to urine.       HPI:  This is a 69 year old female patient presenting for UTI (UTI symptoms. Burning sensation and urgency to urine.)    History of Present Illness  Kirsten Guajardo is a 69-year-old female with recurrent bladder infections who presents with urinary frequency and burning.    She has been experiencing urinary frequency, a burning sensation, and difficulty holding urine for the past five days. She is concerned about the possibility of another bladder infection, given her history of recurrent infections. She has had numerous bladder infections throughout her life. No fever, abdominal pain, or back pain is present.    She has a known allergy to sulfa and penicillin antibiotics but has tolerated other antibiotics well in the past.    She has been monitoring her blood glucose levels due to a previous high A1c. Her A1c has decreased from 5.9% in January to 5.7% recently, which she attributes to a strict low-carb diet over the past three months, resulting in a weight loss of six pounds. She has a significant family history of diabetes and is aware of her underlying insulin resistance.    Her cholesterol levels have improved, with her LDL decreasing from 103 to 76. She is not currently in need of any medication refills.    Health Maintenance:  Health Maintenance   Topic Date Due    COVID-19 Vaccine (8 - 2024-25 season) 09/01/2024    Influenza Vaccine (Season Ended) 10/01/2025    Pap Smear  11/07/2025    Annual Physical  01/16/2026    Mammogram  03/28/2026    Colorectal Cancer Screening  03/06/2028    DEXA Scan  Completed    Annual Depression Screening  Completed    Fall Risk Screening (Annual)  Completed    Pneumococcal Vaccine: 50+ Years  Completed    Zoster Vaccines  Completed    Meningococcal B Vaccine  Aged Out       ROS: Review of systems performed and negative unless stated in HPI.    Past  medical, family and social history reviewed and listed as below.    HISTORY:  Past Medical History[1]   Past Surgical History[2]   Family History[3]   Short Social Hx on File[4]     Current Medications[5]  Allergies:  Allergies[6]    EXAM:  Vitals:    04/21/25 1142   BP: 130/68   BP Location: Left arm   Pulse: 76   Resp: 16   Temp: 97.6 °F (36.4 °C)   TempSrc: Oral   SpO2: 95%   Weight: 156 lb (70.8 kg)   Height: 5' 7.99\" (1.727 m)     GENERAL: vitals reviewed and listed above, alert, oriented, appears well hydrated and in no acute distress  HEENT: atraumatic, conjunctiva clear, no obvious abnormalities on inspection   LUNGS: clear to auscultation bilaterally, no wheezes, rales or rhonchi, good air movement  CV: HRRR, no murmurs, no peripheral edema   ABD: Soft, BSx4, mild suprapubic tenderness, no CVA tenderness, non-distended, no guarding or rebound tenderness  EXTREMITIES: warm and well perfused  PSYCH: pleasant and cooperative, no obvious depression or anxiety    ASSESSMENT AND PLAN:  Discussed the following assessment   1. Acute cystitis with hematuria (Primary)  -     Urine Dip, auto without Micro  -     Nitrofurantoin Monohyd Macro; Take 1 capsule (100 mg total) by mouth 2 (two) times daily.  Dispense: 10 capsule; Refill: 0  -     Urine Culture, Routine; Future; Expected date: 04/21/2025  -     Urine Culture, Routine  2. Prediabetes  -     Hemoglobin A1C; Future; Expected date: 04/21/2025      Advised the following:  Assessment & Plan  Urinary tract infection  Symptoms include urinary frequency, burning, and difficulty holding urine for 5 days. Urinalysis shows blood, protein, and leukocytes, consistent with infection. No fever, abdominal pain, or back pain reported. Allergies to sulfa and penicillin antibiotics. Macrobid (nitrofurantoin) chosen  - Prescribe Macrobid (nitrofurantoin) for 5 days.  - Send urine for culture to guide antibiotic therapy if needed.  - Advise that Macrobid may cause urine  discoloration (orangey color).  - Instruct to take two doses of Macrobid today if possible.    Prediabetes  A1c decreased from 5.9% in January to 5.7%, indicating some improvement. She has been on a low-carb diet and lost 6 pounds. Underlying insulin resistance likely due to genetic factors. Discussed options of continuing lifestyle modifications versus starting Metformin. She prefers to continue with lifestyle modifications. Staying in the prediabetic range slightly increases the risk of heart disease and progression to diabetes, while medication could help lower sugars but may cause side effects like diarrhea and GI upset.  - Recheck A1c in 3 months to monitor progress.  - Continue current lifestyle modifications, including balanced diet and exercise.  -Addendum- pt called back and requests starting metformin. Metformin XR 500mg daily ordered.     Pure hypercholesterolemia  LDL cholesterol improved from 103 mg/dL to 76 mg/dL, indicating good control with current management.  - Continue current cholesterol management plan.    RTC in 6 months.     Rose Ngo DO  4/21/2025 8:21 PM  Family Medicine    Note to Patient  The 21st Century Cures Act makes medical notes like these available to patients in the interest of transparency. However, be advised this is a medical document and is intended as [id number]-[id number] communication; it is written in medical language and may appear blunt, direct, or contain abbreviations or verbiage that are unfamiliar. Medical documents are intended to carry relevant information, facts as evident, and the clinical opinion of the practitioner.     This report has been produced using speech recognition software and [employer] technology, and may contain errors related to grammar, punctuation, spelling, words or phrases unrecognized or not translated appropriately to text; these errors may be referred to the dictating provider for further clarification and/or addendum as needed.       [1]   Past  Medical History:   Abdominal distention    Abdominal pain    ALCOHOL USE    Allergic rhinitis    Not sure    Anesthesia complication    Atherosclerosis of coronary artery    Belching    Bloating    Chest pain    Constipation    Diarrhea, unspecified    Essential hypertension    Flatulence/gas pain/belching    Heart palpitations    High cholesterol    History of cardiac murmur    HLD (hyperlipidemia)    HTN (hypertension)    Human papilloma virus infection    Hyperlipidemia    Indigestion    Irregular bowel habits    Nausea    Night sweats    Paroxysmal SVT (supraventricular tachycardia) (HCC)    PONV (postoperative nausea and vomiting)    Sleep apnea    uses CPAP    Visual impairment    contacts and glasses    Wears glasses   [2]   Past Surgical History:  Procedure Laterality Date    Colonoscopy  2009    Colonoscopy      Egd      Eye surgery Right     eyelid    Other      LEX and right hip injection    Other surgical history  Coccyx removed    Removal of coccyx      [place] teeth removed     [3]   Family History  Problem Relation Age of Onset    Heart Disease Mother     Hypertension Mother     Heart Attack Mother     Stroke Mother     Other (rheumatoid arthritis) Mother     Hypertension Father     Heart Attack Father 75    Heart Disorder Father     No Known Problems Sister    [4]   Social History  Socioeconomic History    Marital status:    Occupational History    Occupation: RN     Comment: retired   Tobacco Use    Smoking status: Former     Current packs/day: 0.00     Average packs/day: 2.0 packs/day for 15.0 years (30.0 ttl pk-yrs)     Types: Cigarettes     Quit date: 1987     Years since quittin.6    Smokeless tobacco: Never   Vaping Use    Vaping status: Never Used   Substance and Sexual Activity    Alcohol use: Yes     Alcohol/week: 1.0 standard drink of alcohol     Types: 1 Glasses of wine per week     Comment: soc    Drug use: Never    Sexual activity: Yes     Partners: Male   Other  Topics Concern    Caffeine Concern No    Exercise Yes     Comment: yoga/swimming twice weekly    Seat Belt Yes    Self-Exams No     Comment: Every 3 months   [5]   Current Outpatient Medications   Medication Sig Dispense Refill    dilTIAZem HCl ER Coated Beads 180 MG Oral Capsule SR 24 Hr Take 1 capsule (180 mg total) by mouth 2 (two) times daily.      nitrofurantoin monohydrate macro 100 MG Oral Cap Take 1 capsule (100 mg total) by mouth 2 (two) times daily. 10 capsule 0    tretinoin 0.025 % External Cream TOPICALLY APPLY A PEA SIZED AMOUNT TO FACE AT BEDTIME      azelastine 0.1 % Nasal Solution INHALE 1-2 SPRAYS IN EACH NOSTRIL TWICE DAILY AS NEEDED FOR CONGESTED OR RUNNY NOSE      rosuvastatin 10 MG Oral Tab Take 1 tablet (10 mg total) by mouth nightly. 90 tablet 1    citalopram 20 MG Oral Tab TAKE 1 TABLET BY MOUTH DAILY 90 tablet 1    losartan 100 MG Oral Tab Take 1 tablet (100 mg total) by mouth daily. 90 tablet 1    aspirin 81 MG Oral Tab EC Take 1 tablet (81 mg total) by mouth daily.      Calcium Carb-Cholecalciferol (CALCIUM + D3) 600-200 MG-UNIT Oral Tab Take 1 tablet by mouth daily.        multivitamin Oral Tab Take 1 tablet by mouth daily.     [6]   Allergies  Allergen Reactions    Atenolol HIVES    Beta Adrenergic Blockers HIVES     Atenolo, Toprol.    Duloxetine OTHER (SEE COMMENTS)    Duloxetine Hcl PALPITATIONS and OTHER (SEE COMMENTS)     Other reaction(s): Other (See Comments)    Penicillins HIVES and RASH    Sulfa Antibiotics SWELLING and OTHER (SEE COMMENTS)     Other reaction(s): Other (See Comments)    Severe joint pain    Other reaction(s): Swelling    Simvastatin CONFUSION     Other reaction(s): Altered Mental Status    Lisinopril Coughing

## 2025-04-25 ENCOUNTER — TELEPHONE (OUTPATIENT)
Dept: FAMILY MEDICINE CLINIC | Facility: CLINIC | Age: 70
End: 2025-04-25

## 2025-04-25 NOTE — TELEPHONE ENCOUNTER
Pt called with question on metFORMIN  MG Oral Tablet. Dr. Ngo said to take it in the am and pharmacist said to take in the evening. Wants to clarify. Please call.

## 2025-04-28 NOTE — TELEPHONE ENCOUNTER
Spoke w/pt and let her know. Pt verbalized agreement and understanding and will take in the morning.

## 2025-05-23 ENCOUNTER — OFFICE VISIT (OUTPATIENT)
Dept: FAMILY MEDICINE CLINIC | Facility: CLINIC | Age: 70
End: 2025-05-23
Payer: MEDICARE

## 2025-05-23 VITALS
DIASTOLIC BLOOD PRESSURE: 60 MMHG | OXYGEN SATURATION: 98 % | RESPIRATION RATE: 16 BRPM | TEMPERATURE: 97 F | WEIGHT: 157.19 LBS | SYSTOLIC BLOOD PRESSURE: 132 MMHG | HEART RATE: 88 BPM | HEIGHT: 67 IN | BODY MASS INDEX: 24.67 KG/M2

## 2025-05-23 DIAGNOSIS — R94.8 ABNORMAL POSITRON EMISSION TOMOGRAPHY (PET) SCAN: Primary | ICD-10-CM

## 2025-05-23 DIAGNOSIS — R73.03 PREDIABETES: ICD-10-CM

## 2025-05-23 DIAGNOSIS — R91.1 INCIDENTAL LUNG NODULE, GREATER THAN OR EQUAL TO 8MM: ICD-10-CM

## 2025-05-23 DIAGNOSIS — M54.6 CHRONIC BILATERAL THORACIC BACK PAIN: ICD-10-CM

## 2025-05-23 DIAGNOSIS — N28.1 COMPLEX RENAL CYST: ICD-10-CM

## 2025-05-23 DIAGNOSIS — I25.10 CORONARY ARTERY DISEASE INVOLVING NATIVE CORONARY ARTERY OF NATIVE HEART WITHOUT ANGINA PECTORIS: ICD-10-CM

## 2025-05-23 DIAGNOSIS — N95.1 MENOPAUSAL AND FEMALE CLIMACTERIC STATES: ICD-10-CM

## 2025-05-23 DIAGNOSIS — E78.00 PURE HYPERCHOLESTEROLEMIA: ICD-10-CM

## 2025-05-23 DIAGNOSIS — I10 HTN (HYPERTENSION), BENIGN: ICD-10-CM

## 2025-05-23 DIAGNOSIS — I47.10 PSVT (PAROXYSMAL SUPRAVENTRICULAR TACHYCARDIA) (HCC): ICD-10-CM

## 2025-05-23 DIAGNOSIS — G89.29 CHRONIC BILATERAL THORACIC BACK PAIN: ICD-10-CM

## 2025-05-23 PROCEDURE — 99214 OFFICE O/P EST MOD 30 MIN: CPT | Performed by: FAMILY MEDICINE

## 2025-05-23 RX ORDER — METFORMIN HYDROCHLORIDE 500 MG/1
500 TABLET, EXTENDED RELEASE ORAL 2 TIMES DAILY WITH MEALS
Qty: 180 TABLET | Refills: 1 | Status: SHIPPED | OUTPATIENT
Start: 2025-05-23

## 2025-06-01 NOTE — PROGRESS NOTES
Chief Complaint:  Chief Complaint   Patient presents with    Medication Follow-Up     metFORMIN  MG        HPI:  This is a 69 year old female patient presenting for Medication Follow-Up (metFORMIN  MG )    History of Present Illness  Kirsten Guajardo is a 69 year old female with hypertension, SVT, and coronary artery disease who presents for follow-up on her chronic conditions.    She is currently on losartan 100 mg and diltiazem 180 mg for her cardiovascular conditions. A cardiac catheterization in August 2020 showed nonobstructive coronary artery disease. She is also on rosuvastatin 10 mg and aspirin for hyperlipidemia, with her last LDL recorded at 76 mg/dL on April 16, 2025.    She started metformin extended release for prediabetes, with her last A1c at 5.7%, down from 5.9%. She uses a continuous glucose monitor, noting fasting blood sugars around 120 mg/dL, which is higher than her previous levels of 100-108 mg/dL. A zero-carb diet was not effective, causing fatigue.    She experiences menopausal symptoms, specifically hot flashes, and is currently on Celexa, which has helped with anxiety and sleep. No suicidal or homicidal ideations.    She has a history of pulmonary nodules, evaluated with a PET scan, and a follow-up CT scan is planned in one year. She also has a history of aortic stenosis with left atrial stenosis, evaluated by vascular surgery.    She has a complex renal cyst, followed by urology with serial CT scans and ultrasounds. The last imaging on August 8, 2024, showed no change from previous scans.    She reports chronic back pain, particularly in the thoracic region, experienced for many years, especially after swimming. No recent falls or injuries, and the pain does not increase with deep breaths but is more noticeable after physical activity.    Health Maintenance:  Health Maintenance   Topic Date Due    COVID-19 Vaccine (8 - 2024-25 season) 09/01/2024    Influenza Vaccine (Season  Ended) 10/01/2025    Pap Smear  11/07/2025    Annual Physical  01/16/2026    Mammogram  03/28/2026    Colorectal Cancer Screening  03/06/2028    DEXA Scan  Completed    Annual Depression Screening  Completed    Fall Risk Screening (Annual)  Completed    Pneumococcal Vaccine: 50+ Years  Completed    Zoster Vaccines  Completed    Meningococcal B Vaccine  Aged Out       ROS: Review of systems performed and negative unless stated in HPI.    Past medical, family and social history reviewed and listed as below.    HISTORY:  Past Medical History[1]   Past Surgical History[2]   Family History[3]   Short Social Hx on File[4]     Current Medications[5]  Allergies:  Allergies[6]    EXAM:  Vitals:    05/23/25 0921   BP: 132/60   BP Location: Right arm   Pulse: 88   Resp: 16   Temp: 97.4 °F (36.3 °C)   TempSrc: Oral   SpO2: 98%   Weight: 157 lb 3.2 oz (71.3 kg)   Height: 5' 7\" (1.702 m)     GENERAL: vitals reviewed and listed above, alert, oriented, appears well hydrated and in no acute distress  HEENT: atraumatic, conjunctiva clear, no obvious abnormalities on inspection   LUNGS: clear to auscultation bilaterally, no wheezes, rales or rhonchi, good air movement  CV: HRRR, no murmurs, no peripheral edema   EXTREMITIES: warm and well perfused  PSYCH: pleasant and cooperative, no obvious depression or anxiety    ASSESSMENT AND PLAN:  Discussed the following assessment   1. Abnormal positron emission tomography (PET) scan (Primary)  -     MRI SPINE THORACIC (W+WO) (CPT=72157); Future; Expected date: 05/23/2025  2. Prediabetes  -     metFORMIN HCl ER; Take 1 tablet (500 mg total) by mouth 2 (two) times daily with meals.  Dispense: 180 tablet; Refill: 1  3. HTN (hypertension), benign  4. Coronary artery disease involving native coronary artery of native heart without angina pectoris  5. Pure hypercholesterolemia  6. PSVT (paroxysmal supraventricular tachycardia) (HCC)  Overview:  Formatting of this note might be different from the  original.  Controlled long term on cardizem and diovan  7. Chronic bilateral thoracic back pain  8. Menopausal and female climacteric states  9. Incidental lung nodule, greater than or equal to 8mm  10. Complex renal cyst      Advised the following:  Assessment & Plan  Prediabetes  Prediabetes with recent A1c of 5.7%, decreased from 5.9%. Fasting glucose levels around 120 mg/dL, higher than previous levels. Currently on metformin extended release 500 mg once daily.  - Increase metformin to 500 mg twice daily.  - Consider continuous glucose monitoring after one week of increased metformin dose.  - Monitor for side effects such as diarrhea.  - Consider further increase of metformin if glucose levels do not improve.  - Evaluate the use of magnesium supplements if constipation persists.    Hypertension  Hypertension managed with losartan 100 mg and diltiazem 180 mg. Blood pressure is well-controlled.    Coronary artery disease  Coronary artery disease with nonobstructive findings on cardiac catheterization in August 2020. Managed with rosuvastatin 10 mg and aspirin.    Supraventricular tachycardia (SVT)  SVT managed with diltiazem 180 mg. No recent episodes reported.    Hyperlipidemia  Hyperlipidemia managed with rosuvastatin 10 mg. Last LDL was 76 mg/dL on April 16, 2025, indicating good control.    Back pain, abnormal PET scan  Chronic back pain in the thoracic region with increased uptake at T5 vertebral body on PET scan. Differential includes compression fracture, vascular formation, or malignancy. No point tenderness on examination.  - Order MRI of thoracic spine with and without contrast to evaluate T5 vertebral body.    Menopausal symptoms  Menopausal symptoms managed with Celexa, which has improved anxiety and sleep.    Aortic stenosis  Aortic stenosis with left atrial stenosis, evaluated by vascular surgery and determined not severe. Monitoring recommended.    History of pulmonary nodules  Pulmonary nodules  evaluated with PET scan, showing no significant uptake. No current follow-up needed.    History of complex renal cyst  Complex renal cyst followed with urology. Last imaging on August 8, 2024, showed no change. Serial CT scans and ultrasounds recommended for follow-up.    Recording duration: 21 minutes    RTC in 6 months.   Rose Ngo DO  5/23/2025 11:37 PM  Family Medicine    Note to Patient  The 21st Century Cures Act makes medical notes like these available to patients in the interest of transparency. However, be advised this is a medical document and is intended as bsja-pu-dlkk communication; it is written in medical language and may appear blunt, direct, or contain abbreviations or verbiage that are unfamiliar. Medical documents are intended to carry relevant information, facts as evident, and the clinical opinion of the practitioner.     This report has been produced using speech recognition software and [employer] technology, and may contain errors related to grammar, punctuation, spelling, words or phrases unrecognized or not translated appropriately to text; these errors may be referred to the dictating provider for further clarification and/or addendum as needed.       [1]   Past Medical History:   Abdominal distention    Abdominal pain    ALCOHOL USE    Allergic rhinitis    Not sure    Anesthesia complication    Atherosclerosis of coronary artery    Belching    Bloating    Chest pain    Constipation    Diarrhea, unspecified    Essential hypertension    Flatulence/gas pain/belching    Heart palpitations    High cholesterol    History of cardiac murmur    HLD (hyperlipidemia)    HTN (hypertension)    Human papilloma virus infection    Hyperlipidemia    Indigestion    Irregular bowel habits    Nausea    Night sweats    Paroxysmal SVT (supraventricular tachycardia) (HCC)    PONV (postoperative nausea and vomiting)    Sleep apnea    uses CPAP    Visual impairment    contacts and glasses    Wears glasses    [2]   Past Surgical History:  Procedure Laterality Date    Colonoscopy  2009    Colonoscopy      Egd      Eye surgery Right     eyelid    Other      LEX and right hip injection    Other surgical history  Coccyx removed    Removal of coccyx      [place] teeth removed     [3]   Family History  Problem Relation Age of Onset    Heart Disease Mother     Hypertension Mother     Heart Attack Mother     Stroke Mother     Other (rheumatoid arthritis) Mother     Hypertension Father     Heart Attack Father 75    Heart Disorder Father     No Known Problems Sister    [4]   Social History  Socioeconomic History    Marital status:    Occupational History    Occupation: RN     Comment: retired   Tobacco Use    Smoking status: Former     Current packs/day: 0.00     Average packs/day: 2.0 packs/day for 15.0 years (30.0 ttl pk-yrs)     Types: Cigarettes     Quit date: 1987     Years since quittin.7    Smokeless tobacco: Never   Vaping Use    Vaping status: Never Used   Substance and Sexual Activity    Alcohol use: Yes     Alcohol/week: 1.0 standard drink of alcohol     Types: 1 Glasses of wine per week     Comment: soc    Drug use: Never    Sexual activity: Yes     Partners: Male   Other Topics Concern    Caffeine Concern No    Exercise Yes     Comment: yoga/swimming twice weekly    Seat Belt Yes    Self-Exams No     Comment: Every 3 months   [5]   Current Outpatient Medications   Medication Sig Dispense Refill    metFORMIN  MG Oral Tablet 24 Hr Take 1 tablet (500 mg total) by mouth 2 (two) times daily with meals. 180 tablet 1    dilTIAZem HCl ER Coated Beads 180 MG Oral Capsule SR 24 Hr Take 1 capsule (180 mg total) by mouth 2 (two) times daily.      tretinoin 0.025 % External Cream TOPICALLY APPLY A PEA SIZED AMOUNT TO FACE AT BEDTIME      azelastine 0.1 % Nasal Solution INHALE 1-2 SPRAYS IN EACH NOSTRIL TWICE DAILY AS NEEDED FOR CONGESTED OR RUNNY NOSE      rosuvastatin 10 MG Oral Tab Take 1 tablet (10  mg total) by mouth nightly. 90 tablet 1    citalopram 20 MG Oral Tab TAKE 1 TABLET BY MOUTH DAILY 90 tablet 1    losartan 100 MG Oral Tab Take 1 tablet (100 mg total) by mouth daily. 90 tablet 1    aspirin 81 MG Oral Tab EC Take 1 tablet (81 mg total) by mouth daily.      Calcium Carb-Cholecalciferol (CALCIUM + D3) 600-200 MG-UNIT Oral Tab Take 1 tablet by mouth daily.        multivitamin Oral Tab Take 1 tablet by mouth daily.     [6]   Allergies  Allergen Reactions    Atenolol HIVES    Beta Adrenergic Blockers HIVES     Atenolo, Toprol.    Duloxetine OTHER (SEE COMMENTS)    Duloxetine Hcl PALPITATIONS and OTHER (SEE COMMENTS)     Other reaction(s): Other (See Comments)    Penicillins HIVES and RASH    Sulfa Antibiotics SWELLING and OTHER (SEE COMMENTS)     Other reaction(s): Other (See Comments)    Severe joint pain    Other reaction(s): Swelling    Simvastatin CONFUSION     Other reaction(s): Altered Mental Status    Lisinopril Coughing

## 2025-06-11 DIAGNOSIS — I25.10 CORONARY ARTERY DISEASE INVOLVING NATIVE CORONARY ARTERY OF NATIVE HEART WITHOUT ANGINA PECTORIS: ICD-10-CM

## 2025-06-11 DIAGNOSIS — N95.1 MENOPAUSAL AND FEMALE CLIMACTERIC STATES: ICD-10-CM

## 2025-06-11 DIAGNOSIS — E78.00 PURE HYPERCHOLESTEROLEMIA: ICD-10-CM

## 2025-06-11 NOTE — TELEPHONE ENCOUNTER
Patient requesting refills:    rosuvastatin 10 MG Oral Tab     citalopram 20 MG Oral Tab     [facility]

## 2025-06-12 ENCOUNTER — HOSPITAL ENCOUNTER (OUTPATIENT)
Dept: MRI IMAGING | Facility: HOSPITAL | Age: 70
Discharge: HOME | End: 2025-06-12
Attending: FAMILY MEDICINE
Payer: MEDICARE

## 2025-06-12 DIAGNOSIS — R94.8 ABNORMAL POSITRON EMISSION TOMOGRAPHY (PET) SCAN: ICD-10-CM

## 2025-06-12 PROCEDURE — 72157 MRI CHEST SPINE W/O & W/DYE: CPT | Performed by: FAMILY MEDICINE

## 2025-06-12 PROCEDURE — A9575 INJ GADOTERATE MEGLUMI 0.1ML: HCPCS | Performed by: FAMILY MEDICINE

## 2025-06-12 RX ORDER — CITALOPRAM HYDROBROMIDE 20 MG/1
20 TABLET ORAL DAILY
Qty: 90 TABLET | Refills: 3 | OUTPATIENT
Start: 2025-06-12

## 2025-06-12 RX ORDER — ROSUVASTATIN CALCIUM 10 MG/1
10 TABLET, COATED ORAL NIGHTLY
Qty: 90 TABLET | Refills: 3 | OUTPATIENT
Start: 2025-06-12

## 2025-06-12 RX ORDER — ROSUVASTATIN CALCIUM 10 MG/1
10 TABLET, COATED ORAL NIGHTLY
Qty: 90 TABLET | Refills: 1 | OUTPATIENT
Start: 2025-06-12

## 2025-06-12 RX ORDER — GADOTERATE MEGLUMINE 376.9 MG/ML
15 INJECTION INTRAVENOUS
Status: COMPLETED | OUTPATIENT
Start: 2025-06-12 | End: 2025-06-12

## 2025-06-12 RX ORDER — CITALOPRAM HYDROBROMIDE 20 MG/1
20 TABLET ORAL DAILY
Qty: 90 TABLET | Refills: 1 | OUTPATIENT
Start: 2025-06-12

## 2025-06-12 RX ADMIN — GADOTERATE MEGLUMINE 15 ML: 376.9 INJECTION INTRAVENOUS at 10:12:00

## 2025-07-15 ENCOUNTER — TELEPHONE (OUTPATIENT)
Dept: FAMILY MEDICINE CLINIC | Facility: CLINIC | Age: 70
End: 2025-07-15

## 2025-07-15 NOTE — TELEPHONE ENCOUNTER
Patient calling that she has this awful cough and would like to be seen sooner told her about the walk in clinic or to talk to a nurse with what to do for the mean time

## 2025-07-15 NOTE — TELEPHONE ENCOUNTER
Spoke with patient, patient has appointment for chronic cough for tomorrow. Patient advised to visit urgent care if symptoms are intolerable. Patient may also try OTC cough suppressants. Patient verbalized understanding.

## 2025-07-16 ENCOUNTER — OFFICE VISIT (OUTPATIENT)
Dept: FAMILY MEDICINE CLINIC | Facility: CLINIC | Age: 70
End: 2025-07-16
Payer: MEDICARE

## 2025-07-16 VITALS
WEIGHT: 152 LBS | HEIGHT: 67 IN | SYSTOLIC BLOOD PRESSURE: 124 MMHG | RESPIRATION RATE: 18 BRPM | OXYGEN SATURATION: 97 % | HEART RATE: 104 BPM | DIASTOLIC BLOOD PRESSURE: 62 MMHG | BODY MASS INDEX: 23.86 KG/M2

## 2025-07-16 DIAGNOSIS — R73.03 PREDIABETES: ICD-10-CM

## 2025-07-16 DIAGNOSIS — R05.1 ACUTE COUGH: Primary | ICD-10-CM

## 2025-07-16 DIAGNOSIS — G47.33 OSA (OBSTRUCTIVE SLEEP APNEA): ICD-10-CM

## 2025-07-16 LAB — HEMOGLOBIN A1C: 5.7 % (ref 4.3–5.6)

## 2025-07-16 PROCEDURE — 83036 HEMOGLOBIN GLYCOSYLATED A1C: CPT | Performed by: NURSE PRACTITIONER

## 2025-07-16 PROCEDURE — 99214 OFFICE O/P EST MOD 30 MIN: CPT | Performed by: NURSE PRACTITIONER

## 2025-07-16 RX ORDER — ALBUTEROL SULFATE 90 UG/1
2 INHALANT RESPIRATORY (INHALATION) EVERY 4 HOURS PRN
Qty: 8 G | Refills: 0 | Status: SHIPPED | OUTPATIENT
Start: 2025-07-16

## 2025-07-16 RX ORDER — PREDNISONE 20 MG/1
40 TABLET ORAL DAILY
Qty: 10 TABLET | Refills: 0 | Status: SHIPPED | OUTPATIENT
Start: 2025-07-16

## 2025-07-16 NOTE — PATIENT INSTRUCTIONS
Take prednisone, 2 tabs, at the same time, daily for 5 days. Take early in the morning.   Do not take any Motrin, Advil, ibuprofen products or Aleve while taking this medication.    It is ok to take Tylenol (acetaminophen) while taking this medication. Follow  instructions for dosing and frequency schedule.     Use Azelastine, one spray each nostril, morning and evening, for 10-14 days.    Continue with Zyrtec nightly.      Use albuterol inhaler as needed. Be sure to space doses at least 4 hours apart.     Inhaler should be primed before first use only by pressing inhaler down once or twice, without inhaler your mouth, until you see medication squirt into the air. You do not need to do this each time you use the inhaler.       Shake inhaler prior to each use.     Place inhaler in one end of the spacer and the other end of spacer in your mouth. Activate inhaler into the spacer AND THEN take deep breath in. If the spacer makes a noise during use, you are breathing in too quickly.     Wait 1-2 minutes and repeat above procedure, for a total of 2 activations of inhaler each use.      Wait 1-2 minutes and repeat above procedure for a total of 2 inhaler activations each use.

## 2025-07-16 NOTE — PROGRESS NOTES
Chief Complaint   Patient presents with    Cough     Per pt has had a cough/ wheezing  x 4 days, doesn't have any other symptoms     HPI:  Presents with approx 4 day history of cough with wheezing. Does have some SOB. Denies fever/chills, sore throat, nasal/sinus congestion, ear pain/pressure, body aches or fatigue. Has been treating with Robitussin with some relief.     Prediabetes- taking medications as ordered. Working to watch diet. Denies polyuria, polydipsia, polyphagia, shakiness, dizziness or visual changes.     STAR- follows with Dr. Briggs, pulmonology for this. Reports needs new CPAP supplies.     Past Medical History[1]    Problem List[2]    Current Medications[3]    Physical Exam  /62   Pulse 104   Resp 18   Ht 5' 7\" (1.702 m)   Wt 152 lb (68.9 kg)   LMP  (LMP Unknown)   SpO2 97%   BMI 23.81 kg/m²   Constitutional: well developed, well nourished, in no apparent distress  HEENT: Normocephalic and atraumatic. Tympanic membranes clear with bulging bilat, no erythema or air/fluid levels. Oropharynx is erythematous without exudate, lesions or edema. (+)PND. No facial tenderness.  Eyes: Conjunctivae are pink and moist without drainage.   Lymphadenopathy: No cervical adenopathy.   Cardiovascular: Normal rate, regular rhythm.  No murmur.   Pulmonary/Chest: No respiratory distress. Effort normal. Breath sounds clear bilaterally. No wheezes, rhonchi or rales appreciated. No cough heard during exam.   Skin: Skin is warm and dry. No pallor. No rash noted.    A/P:    Encounter Diagnoses   Name Primary?    Acute cough- prednisone burst. Medication administration, use and side effects discussed. Albuterol inhaler PRN. Medication administration, use and side effects discussed. Instructed to notify office if not improved in 4-5 days or if symptoms worsen. Verbalized understanding of instructions and agreeable to this plan of care.     Yes    Prediabetes- in office A1C improved some to 5.7. continue current  management.        STAR (obstructive sleep apnea)- mild, AHI 7.8- referred back to Dr. Briggs for management.           Orders Placed This Encounter   Procedures    POC Hemoglobin A1C       Meds & Refills for this Visit:  Requested Prescriptions     Signed Prescriptions Disp Refills    albuterol (PROAIR HFA) 108 (90 Base) MCG/ACT Inhalation Aero Soln 8 g 0     Sig: Inhale 2 puffs into the lungs every 4 (four) hours as needed.    Spacer/Aero-Holding Chambers Does not apply Device 1 each 0     Sig: For use with albuterol inhaler    predniSONE 20 MG Oral Tab 10 tablet 0     Sig: Take 2 tablets (40 mg total) by mouth daily.       Imaging & Consults:  PULMONARY - INTERNAL    No follow-ups on file.  Patient Instructions                       Take prednisone, 2 tabs, at the same time, daily for 5 days. Take early in the morning.   Do not take any Motrin, Advil, ibuprofen products or Aleve while taking this medication.    It is ok to take Tylenol (acetaminophen) while taking this medication. Follow  instructions for dosing and frequency schedule.     Use Azelastine, one spray each nostril, morning and evening, for 10-14 days.    Continue with Zyrtec nightly.      Use albuterol inhaler as needed. Be sure to space doses at least 4 hours apart.     Inhaler should be primed before first use only by pressing inhaler down once or twice, without inhaler your mouth, until you see medication squirt into the air. You do not need to do this each time you use the inhaler.       Shake inhaler prior to each use.     Place inhaler in one end of the spacer and the other end of spacer in your mouth. Activate inhaler into the spacer AND THEN take deep breath in. If the spacer makes a noise during use, you are breathing in too quickly.     Wait 1-2 minutes and repeat above procedure, for a total of 2 activations of inhaler each use.      Wait 1-2 minutes and repeat above procedure for a total of 2 inhaler activations each use.              All questions were answered and the patient understands the plan.            [1]   Past Medical History:   Abdominal distention    Abdominal pain    ALCOHOL USE    Allergic rhinitis    Not sure    Anesthesia complication    Atherosclerosis of coronary artery    Belching    Bloating    Chest pain    Constipation    Diarrhea, unspecified    Essential hypertension    Flatulence/gas pain/belching    Heart palpitations    High cholesterol    History of cardiac murmur    HLD (hyperlipidemia)    HTN (hypertension)    Human papilloma virus infection    Hyperlipidemia    Indigestion    Irregular bowel habits    Nausea    Night sweats    Paroxysmal SVT (supraventricular tachycardia) (HCC)    PONV (postoperative nausea and vomiting)    Sleep apnea    uses CPAP    Visual impairment    contacts and glasses    Wears glasses   [2]   Patient Active Problem List  Diagnosis    Neuropathy    HTN (hypertension), benign    HLD (hyperlipidemia)    PSVT (paroxysmal supraventricular tachycardia) (HCC)    ASCUS with positive high risk HPV cervical    BITA I (cervical intraepithelial neoplasia I)    Trigger ring finger of left hand    Preventative health care    Colon polyp    Abdominal pain, epigastric    Flatulence, eructation, and gas pain    Nausea    Papanicolaou smear of cervix with low grade squamous intraepithelial lesion (LGSIL)    Coronary artery disease involving native coronary artery of native heart without angina pectoris    Pulmonary nodules    Gastroesophageal reflux disease    Hypothyroidism    IBS (irritable bowel syndrome)    Mitral valve prolapse    STAR (obstructive sleep apnea)- mild, AHI 7.8    Trochanteric bursitis    Hip pain, chronic, right    Chronic obstructive pulmonary disease, unspecified (HCC)    Recurrent cervical intraepithelial neoplasia    Sacroiliitis    Allergic rhinitis   [3]   Current Outpatient Medications   Medication Sig Dispense Refill    albuterol (PROAIR HFA) 108 (90 Base) MCG/ACT Inhalation  Aero Soln Inhale 2 puffs into the lungs every 4 (four) hours as needed. 8 g 0    Spacer/Aero-Holding Chambers Does not apply Device For use with albuterol inhaler 1 each 0    predniSONE 20 MG Oral Tab Take 2 tablets (40 mg total) by mouth daily. 10 tablet 0    metFORMIN  MG Oral Tablet 24 Hr Take 1 tablet (500 mg total) by mouth 2 (two) times daily with meals. 180 tablet 1    dilTIAZem HCl ER Coated Beads 180 MG Oral Capsule SR 24 Hr Take 1 capsule (180 mg total) by mouth 2 (two) times daily.      tretinoin 0.025 % External Cream TOPICALLY APPLY A PEA SIZED AMOUNT TO FACE AT BEDTIME      azelastine 0.1 % Nasal Solution INHALE 1-2 SPRAYS IN EACH NOSTRIL TWICE DAILY AS NEEDED FOR CONGESTED OR RUNNY NOSE      rosuvastatin 10 MG Oral Tab Take 1 tablet (10 mg total) by mouth nightly. 90 tablet 1    citalopram 20 MG Oral Tab TAKE 1 TABLET BY MOUTH DAILY 90 tablet 1    losartan 100 MG Oral Tab Take 1 tablet (100 mg total) by mouth daily. 90 tablet 1    aspirin 81 MG Oral Tab EC Take 1 tablet (81 mg total) by mouth daily.      Calcium Carb-Cholecalciferol (CALCIUM + D3) 600-200 MG-UNIT Oral Tab Take 1 tablet by mouth daily.        multivitamin Oral Tab Take 1 tablet by mouth daily.

## 2025-07-21 ENCOUNTER — TELEPHONE (OUTPATIENT)
Facility: CLINIC | Age: 70
End: 2025-07-21

## 2025-07-21 ENCOUNTER — OFFICE VISIT (OUTPATIENT)
Facility: CLINIC | Age: 70
End: 2025-07-21
Payer: MEDICARE

## 2025-07-21 ENCOUNTER — HOSPITAL ENCOUNTER (OUTPATIENT)
Dept: GENERAL RADIOLOGY | Age: 70
Discharge: HOME | End: 2025-07-21
Payer: MEDICARE

## 2025-07-21 VITALS
HEART RATE: 74 BPM | RESPIRATION RATE: 16 BRPM | HEIGHT: 67 IN | BODY MASS INDEX: 23.86 KG/M2 | SYSTOLIC BLOOD PRESSURE: 148 MMHG | OXYGEN SATURATION: 97 % | WEIGHT: 152 LBS | DIASTOLIC BLOOD PRESSURE: 60 MMHG

## 2025-07-21 DIAGNOSIS — R06.2 WHEEZING: ICD-10-CM

## 2025-07-21 DIAGNOSIS — R06.02 SHORTNESS OF BREATH: Primary | ICD-10-CM

## 2025-07-21 DIAGNOSIS — R05.2 SUBACUTE COUGH: ICD-10-CM

## 2025-07-21 DIAGNOSIS — R05.2 SUBACUTE COUGH: Primary | ICD-10-CM

## 2025-07-21 DIAGNOSIS — R06.02 SHORTNESS OF BREATH: ICD-10-CM

## 2025-07-21 DIAGNOSIS — G47.33 OSA (OBSTRUCTIVE SLEEP APNEA): ICD-10-CM

## 2025-07-21 PROCEDURE — 99214 OFFICE O/P EST MOD 30 MIN: CPT

## 2025-07-21 PROCEDURE — 71046 X-RAY EXAM CHEST 2 VIEWS: CPT

## 2025-07-21 RX ORDER — BUDESONIDE AND FORMOTEROL FUMARATE DIHYDRATE 160; 4.5 UG/1; UG/1
2 AEROSOL RESPIRATORY (INHALATION) 2 TIMES DAILY
Qty: 1 EACH | Refills: 0 | Status: SHIPPED | OUTPATIENT
Start: 2025-07-21
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
## 2025-07-21 NOTE — PATIENT INSTRUCTIONS
Call office with any questions or concerns  Call office if develop any new or worsening respiratory symptoms.   Call office if your inhalers are more than $50 after co-pay  Start the symbicort two puffs in the morning and again in evening. Rinse your mouth out   You can use your albuterol inhaler 4 times per day as needed if you experience shortness of breath, cough, or chest tightness.   If not better in one month, call office- will obtain CT chest

## 2025-07-21 NOTE — TELEPHONE ENCOUNTER
[place] [employer]  She has dry cough, wheeze and shortness of breath.  Over the weekend pt has had a hard time catching her breath.  She denies fever, chills, headaches and body aches.  She saw her nurse practitioner who prescribed a course of Prednisone 40 mg for 5 days.  The last dose was taken today.  He also gave her albuterol but it causes shakiness.    Pt advised that message will be sent to ALVARO Mcclure but should go to ER with decline in respiratory status.

## 2025-07-29 ENCOUNTER — OFFICE VISIT (OUTPATIENT)
Dept: SURGERY | Facility: CLINIC | Age: 70
End: 2025-07-29
Payer: MEDICARE

## 2025-07-29 VITALS
BODY MASS INDEX: 22.96 KG/M2 | DIASTOLIC BLOOD PRESSURE: 68 MMHG | WEIGHT: 155 LBS | SYSTOLIC BLOOD PRESSURE: 134 MMHG | HEIGHT: 69 IN | HEART RATE: 96 BPM | OXYGEN SATURATION: 98 %

## 2025-07-29 DIAGNOSIS — R93.89 ABNORMAL MRI: Primary | ICD-10-CM

## 2025-07-29 DIAGNOSIS — R29.2 HYPERREFLEXIA: ICD-10-CM

## 2025-07-29 DIAGNOSIS — M54.6 THORACIC BACK PAIN, UNSPECIFIED BACK PAIN LATERALITY, UNSPECIFIED CHRONICITY: ICD-10-CM

## 2025-07-29 PROCEDURE — 99204 OFFICE O/P NEW MOD 45 MIN: CPT | Performed by: NEUROLOGICAL SURGERY

## 2025-07-31 ENCOUNTER — HOSPITAL ENCOUNTER (OUTPATIENT)
Dept: MRI IMAGING | Age: 70
Discharge: HOME | End: 2025-07-31
Attending: NEUROLOGICAL SURGERY

## 2025-07-31 DIAGNOSIS — R29.2 HYPERREFLEXIA: ICD-10-CM

## 2025-07-31 PROCEDURE — 72141 MRI NECK SPINE W/O DYE: CPT | Performed by: NEUROLOGICAL SURGERY

## 2025-08-08 ENCOUNTER — HOSPITAL ENCOUNTER (OUTPATIENT)
Dept: CV DIAGNOSTICS | Facility: HOSPITAL | Age: 70
Discharge: HOME | End: 2025-08-08
Attending: INTERNAL MEDICINE

## 2025-08-08 ENCOUNTER — TELEPHONE (OUTPATIENT)
Dept: FAMILY MEDICINE CLINIC | Facility: CLINIC | Age: 70
End: 2025-08-08

## 2025-08-08 DIAGNOSIS — I47.10 SVT (SUPRAVENTRICULAR TACHYCARDIA) (HCC): ICD-10-CM

## 2025-08-08 DIAGNOSIS — I47.10 SVT (SUPRAVENTRICULAR TACHYCARDIA) (HCC): Primary | ICD-10-CM

## 2025-08-08 DIAGNOSIS — I10 BENIGN ESSENTIAL HTN: ICD-10-CM

## 2025-08-08 PROCEDURE — 93306 TTE W/DOPPLER COMPLETE: CPT | Performed by: INTERNAL MEDICINE

## 2025-08-15 ENCOUNTER — OFFICE VISIT (OUTPATIENT)
Dept: FAMILY MEDICINE CLINIC | Facility: CLINIC | Age: 70
End: 2025-08-15

## 2025-08-15 VITALS
DIASTOLIC BLOOD PRESSURE: 56 MMHG | OXYGEN SATURATION: 97 % | SYSTOLIC BLOOD PRESSURE: 132 MMHG | RESPIRATION RATE: 16 BRPM | BODY MASS INDEX: 23 KG/M2 | HEART RATE: 97 BPM | WEIGHT: 156 LBS

## 2025-08-15 DIAGNOSIS — K59.00 CONSTIPATION, UNSPECIFIED CONSTIPATION TYPE: ICD-10-CM

## 2025-08-15 DIAGNOSIS — R73.03 PREDIABETES: ICD-10-CM

## 2025-08-15 DIAGNOSIS — N95.1 MENOPAUSAL AND FEMALE CLIMACTERIC STATES: ICD-10-CM

## 2025-08-15 DIAGNOSIS — I25.10 CORONARY ARTERY DISEASE INVOLVING NATIVE CORONARY ARTERY OF NATIVE HEART WITHOUT ANGINA PECTORIS: ICD-10-CM

## 2025-08-15 DIAGNOSIS — R30.0 DYSURIA: Primary | ICD-10-CM

## 2025-08-15 DIAGNOSIS — I10 ESSENTIAL HYPERTENSION: ICD-10-CM

## 2025-08-15 DIAGNOSIS — E78.00 PURE HYPERCHOLESTEROLEMIA: ICD-10-CM

## 2025-08-15 LAB
APPEARANCE: CLEAR
BILIRUBIN: NEGATIVE
GLUCOSE (URINE DIPSTICK): NEGATIVE MG/DL
KETONES (URINE DIPSTICK): NEGATIVE MG/DL
MULTISTIX LOT#: ABNORMAL NUMERIC
NITRITE, URINE: NEGATIVE
PH, URINE: 7 (ref 4.5–8)
PROTEIN (URINE DIPSTICK): NEGATIVE MG/DL
SPECIFIC GRAVITY: 1.01 (ref 1–1.03)
URINE-COLOR: YELLOW
UROBILINOGEN,SEMI-QN: 0.2 MG/DL (ref 0–1.9)

## 2025-08-15 PROCEDURE — 81003 URINALYSIS AUTO W/O SCOPE: CPT | Performed by: FAMILY MEDICINE

## 2025-08-15 PROCEDURE — 99214 OFFICE O/P EST MOD 30 MIN: CPT | Performed by: FAMILY MEDICINE

## 2025-08-15 RX ORDER — CITALOPRAM HYDROBROMIDE 20 MG/1
20 TABLET ORAL DAILY
Qty: 90 TABLET | Refills: 3 | Status: SHIPPED | OUTPATIENT
Start: 2025-08-15

## 2025-08-15 RX ORDER — ROSUVASTATIN CALCIUM 10 MG/1
10 TABLET, COATED ORAL NIGHTLY
Qty: 90 TABLET | Refills: 3 | Status: SHIPPED | OUTPATIENT
Start: 2025-08-15

## 2025-08-15 RX ORDER — NITROFURANTOIN 25; 75 MG/1; MG/1
100 CAPSULE ORAL 2 TIMES DAILY
Qty: 10 CAPSULE | Refills: 0 | Status: SHIPPED | OUTPATIENT
Start: 2025-08-15

## 2025-08-15 RX ORDER — LOSARTAN POTASSIUM 100 MG/1
100 TABLET ORAL DAILY
Qty: 90 TABLET | Refills: 3 | Status: SHIPPED | OUTPATIENT
Start: 2025-08-15

## 2025-08-19 RX ORDER — DILTIAZEM HYDROCHLORIDE 180 MG/1
180 CAPSULE, COATED, EXTENDED RELEASE ORAL 2 TIMES DAILY
Qty: 180 CAPSULE | Refills: 0 | OUTPATIENT
Start: 2025-08-19

## 2025-08-20 ENCOUNTER — APPOINTMENT (OUTPATIENT)
Dept: PHYSICAL THERAPY | Facility: HOSPITAL | Age: 70
End: 2025-08-20
Attending: NEUROLOGICAL SURGERY

## 2025-08-22 PROBLEM — R14.3 FLATULENCE, ERUCTATION, AND GAS PAIN: Status: RESOLVED | Noted: 2019-06-06 | Resolved: 2025-08-22

## 2025-08-22 PROBLEM — J44.9 CHRONIC OBSTRUCTIVE PULMONARY DISEASE, UNSPECIFIED (HCC): Status: RESOLVED | Noted: 2022-04-25 | Resolved: 2025-08-22

## 2025-08-22 PROBLEM — R14.1 FLATULENCE, ERUCTATION, AND GAS PAIN: Status: RESOLVED | Noted: 2019-06-06 | Resolved: 2025-08-22

## 2025-08-22 PROBLEM — R14.2 FLATULENCE, ERUCTATION, AND GAS PAIN: Status: RESOLVED | Noted: 2019-06-06 | Resolved: 2025-08-22

## 2025-08-22 PROBLEM — R11.0 NAUSEA: Status: RESOLVED | Noted: 2019-06-06 | Resolved: 2025-08-22

## 2025-08-26 ENCOUNTER — OFFICE VISIT (OUTPATIENT)
Facility: CLINIC | Age: 70
End: 2025-08-26

## 2025-08-26 VITALS
RESPIRATION RATE: 16 BRPM | HEIGHT: 68 IN | WEIGHT: 155 LBS | SYSTOLIC BLOOD PRESSURE: 140 MMHG | OXYGEN SATURATION: 98 % | HEART RATE: 91 BPM | BODY MASS INDEX: 23.49 KG/M2 | DIASTOLIC BLOOD PRESSURE: 80 MMHG

## 2025-08-26 DIAGNOSIS — G47.33 OSA (OBSTRUCTIVE SLEEP APNEA): ICD-10-CM

## 2025-08-26 DIAGNOSIS — R91.8 PULMONARY NODULES: Primary | ICD-10-CM

## 2025-08-26 DIAGNOSIS — J30.1 ALLERGIC RHINITIS DUE TO POLLEN, UNSPECIFIED SEASONALITY: ICD-10-CM

## 2025-08-26 DIAGNOSIS — I10 HTN (HYPERTENSION), BENIGN: ICD-10-CM

## 2025-08-26 PROCEDURE — 99215 OFFICE O/P EST HI 40 MIN: CPT | Performed by: INTERNAL MEDICINE

## 2025-08-27 ENCOUNTER — APPOINTMENT (OUTPATIENT)
Dept: PHYSICAL THERAPY | Facility: HOSPITAL | Age: 70
End: 2025-08-27
Attending: NEUROLOGICAL SURGERY

## 2025-08-29 ENCOUNTER — APPOINTMENT (OUTPATIENT)
Dept: PHYSICAL THERAPY | Facility: HOSPITAL | Age: 70
End: 2025-08-29
Attending: NEUROLOGICAL SURGERY

## (undated) DIAGNOSIS — I47.1 PAROXYSMAL SVT (SUPRAVENTRICULAR TACHYCARDIA) (HCC): ICD-10-CM

## (undated) DIAGNOSIS — I10 ESSENTIAL HYPERTENSION: ICD-10-CM

## (undated) DIAGNOSIS — R91.8 MULTIPLE PULMONARY NODULES: Primary | ICD-10-CM

## (undated) DEVICE — GLOVE SURG SENSICARE SZ 7-1/2

## (undated) DEVICE — ELECTRODE BALL 5MM DBL-511

## (undated) DEVICE — PAIN TRAY: Brand: MEDLINE INDUSTRIES, INC.

## (undated) DEVICE — SLEEVE KENDALL SCD EXPRESS MED

## (undated) DEVICE — REMOVER DURAPREP 3M

## (undated) DEVICE — SOLUTION  .9 1000ML BTL

## (undated) DEVICE — MEGADYNE ELECTRODE ADULT PT RT

## (undated) DEVICE — BANDAID COVERLET 1X3

## (undated) DEVICE — MARKER SKIN 2 TIP

## (undated) DEVICE — PROCTO SWABS: Brand: DEROYAL

## (undated) DEVICE — ELECTRODE EDGE PENCIL 10FT

## (undated) DEVICE — STERILE LATEX POWDER-FREE SURGICAL GLOVES WITH HYDROGEL COATING, SMOOTH FINISH, STRAIGHT FINGER: Brand: PROTEXIS

## (undated) DEVICE — NEEDLE SPINAL 22X3-1/2 BLK

## (undated) DEVICE — GLOVE SURG SENSICARE SZ 6-1/2

## (undated) DEVICE — GYN CDS: Brand: MEDLINE INDUSTRIES, INC.

## (undated) DEVICE — ELECTRODE LOOP YELLOW 10X10

## (undated) NOTE — LETTER
Melvin Arnold, :10/13/1955    CONSENT FOR PROCEDURE/SEDATION    1. I authorize the performance upon Melvin Arnold  the following: Colposcopy with biopsy and Endocervical curettage    2.  I authorize Dr. Marlena Henderson MD (and whomever is [facility] Relationship to patient: ____________________________________________    Witness: _________________________________________ Date:___________     Physician Signature: _______________________________ Date:___________

## (undated) NOTE — LETTER
[facility] [address], [address]  Consent for Procedure/Sedation    Date:     Time:       1.  I authorize the performance upon Meg Dubose the following:cardiac catheterization, left ventricular cineangiography, bilateral se Signature of person authorized                                     Relationship to  to consent for patient: _________________________ patient: ___________________    Witness: _______________________________ Date: _____________________    Printed: 8/5/2020

## (undated) NOTE — Clinical Note
Patient Name: Jun Jaquez  : 10/13/1955  MRN: [id number]  Patient Address: [address] [address]      Coronavirus Disease 2019 (COVID-19)     [place] [address] is committed to the safety and well-being of our patients, shelby carefully. If your symptoms get worse, call your healthcare provider immediately. 3. Get rest and stay hydrated.    4. If you have a medical appointment, call the healthcare provider ahead of time and tell them that you have or may have COVID-19.  5. For m of fever-reducing medications; and  · Improvement in respiratory symptoms (e.g., cough, shortness of breath); and  · At least 10 days have passed since symptoms first appeared OR if asymptomatic patient or date of symptom onset is unclear then use 10 days donors must:    · Have had a confirmed diagnosis of COVID-19  · Be symptom-free for at least 14 days*    *Some people will be required to have a repeat COVID-19 test in order to be eligible to donate.  If you’re instructed by Akhil that a repeat test is r random. Researchers are trying to identify similarities between people with a Post-COVID condition to better understand if there are risk factors. How do I prevent a Post-COVID condition?   The best way to prevent the long-term symptoms of COVID-19 is

## (undated) NOTE — LETTER
SOFIYA Notifier: [employer]   SIMONA Patient Name: Tammy Boles. Identification Number: [id number]      Advance Beneficiary Notice of Noncoverage (ABN)  NOTE:  If Medicare doesn’t pay for D. Item/service(s) below, you may have to pay.   Medicare the D. Item/service(s) listed above, but do not bill Medicare. You may ask to be paid now as I am responsible for payment. I cannot appeal if Medicare is not billed. ? OPTION 3. I don’t want the D. Item/service(s) listed above.  I understand with this reynolds

## (undated) NOTE — LETTER
SOFIYA Notifier: [employer]   BEATRIZ. Patient Name: Pierre Desai. Identification Number: [id number]      Advance Beneficiary Notice of Noncoverage (ABN)  NOTE:  If Medicare doesn’t pay for breast,pelvic,pap) below, you may have to pay.   Medicare ? OPTION 2. I want the D. Item/service(s) listed above, but do not bill Medicare. You may ask to be paid now as I am responsible for payment. I cannot appeal if Medicare is not billed. ? OPTION 3. I don’t want the D. Item/service(s) listed above.  I unde

## (undated) NOTE — LETTER
SOFIYA Notifier: [employer]   BEATRIZ. Patient Name: Jt Villar. Identification Number: [id number]      Advance Beneficiary Notice of Noncoverage (ABN)  NOTE:  If Medicare doesn’t pay for D.  Pap, Breast and Pelvic Exam  below, you may have to pa Summary Notice (MSN). I understand that if Medicare doesn’t pay, I am responsible for payment, but I can appeal to Medicare by following the directions on the MSN.  If Medicare does pay, you will refund any payments I made to you, less co-pays or deductible Form [id number]-131 (Exp. 06/30/2023) Form Approved [facility] [id number]

## (undated) NOTE — LETTER
SOFIYA Notifier: Barrett[employer]   BEATRIZ. Patient Name: Saud Garcia. Identification Number: [id number]      Advance Beneficiary Notice of Noncoverage (ABN)  NOTE:  If Medicare doesn’t pay for D. Item/service(s) below, you may have to pay.   Medicare Item/service(s) listed above, but do not bill Medicare. You may ask to be paid now as I am responsible for payment. I cannot appeal if Medicare is not billed. ? OPTION 3. I don’t want the D. Item/service(s) listed above.  I understand with this choice  I a

## (undated) NOTE — LETTER
Priya Reese, :10/13/1955    CONSENT FOR PROCEDURE/SEDATION    1. I authorize the performance upon Priya Reese  the following: Colposcopy with biopsy and Endocervical curettage    2.  I authorize Dr. Letitia Stover MD (and whomever is [facility] Relationship to patient: ____________________________________________    Witness: _________________________________________ Date:___________     Physician Signature: _______________________________ Date:___________

## (undated) NOTE — MR AVS SNAPSHOT
After Visit Summary   9/29/2021    Kenny Chavez   MRN: [id number]           Visit Information     Date & Time  9/29/2021  8:30 AM Provider  Sharyle Epp, MD Department  [facility] [facility]  [facility]  Phone  [phone]- about 4 months (around 1/29/2022) for Annual.     We Ordered the Following     Normal Orders This Visit    SURGICAL PATHOLOGY TISSUE [[id number] CUSTOM]     THINPREP PAP WITH HPV REFLEX REQUEST B [[id number] CUSTOM]     THINPREP PAP WITH HPV REFLEX REQUEST [LAB4 debit, or health savings card. Not active on [employer]? Ask us how to get signed up today! If you receive a survey from [email or web address], please take a few minutes to complete it and provide feedback.  We strive to deliver the best patient experience and

## (undated) NOTE — LETTER
Cassie Connolly, :10/13/1955    CONSENT FOR PROCEDURE/SEDATION    1. I authorize the performance upon Cassie Connolly  the following: Colposcopy with biopsy and Endocervical curettage    2.  I authorize Dr. Paulo Cortez MD (and whomever is desig Relationship to patient: ____________________________________________    Witness: _________________________________________ Date:___________     Physician Signature: _______________________________ Date:___________

## (undated) NOTE — LETTER
KRISTEN. Notifier: [employer]   BEATRIZ. Patient Name: Michael Castro. Identification Number: [id number]      Advance Beneficiary Notice of Noncoverage (ABN)  NOTE:  If Medicare doesn’t pay for D. Pap,Pelvic & breast exam below, you may have to pay. Summary Notice (MSN). I understand that if Medicare doesn’t pay, I am responsible for payment, but I can appeal to Medicare by following the directions on the MSN.  If Medicare does pay, you will refund any payments I made to you, less co-pays or deductible Form [id number] (Exp. 06/30/2023) Form Approved [employer] [id number]

## (undated) NOTE — MR AVS SNAPSHOT
After Visit Summary   1/27/2021    Juan C Velásquez    MRN: [id number]           Visit Information     Date & Time  1/27/2021  8:30 AM Provider  Avelino Parry MD [facility] [address], [address] [address] [place] Dept.  Phone  [phone] Encounter for screening for cervical cancer    [[id number]]    BITA I (cervical intraepithelial neoplasia I)   [[id number]]             Follow-up    Return in about 1 year (around 1/27/2022) for Annual.     We Ordered the Following     Normal Orders This Visit Communicate with a [facility] Physician or [facility] online. The physician will respond and provide   a treatment plan within a few hours.  ONLINE VISIT  Primary Care Providers  Treatment for mild illness or injury that does not require immediate attention VIDEO VISITS

## (undated) NOTE — LETTER
Jarad Goodman, :10/13/1955    CONSENT FOR PROCEDURE/SEDATION    1. I authorize the performance upon Jarad Goodman  the following: Endocervical Curettage    2.  I authorize Dr. Marlon Fothergill, MD (and whomever is designated as the doctor’s assista ____________________________________________    Witness: _________________________________________ Date:___________     Physician Signature: _______________________________ Date:___________

## (undated) NOTE — LETTER
Patient Name: Brittany Rizzo  : 10/13/1955  MRN: [id number]  Patient Address: [address] [address]      Coronavirus Disease 2019 (COVID-19)     [facility] is committed to the safety and well-being of our patients, shelby carefully. If your symptoms get worse, call your healthcare provider immediately. 3. Get rest and stay hydrated.    4. If you have a medical appointment, call the healthcare provider ahead of time and tell them that you have or may have COVID-19.  5. For m of fever-reducing medications; and  · Improvement in respiratory symptoms (e.g., cough, shortness of breath); and  · At least 10 days have passed since symptoms first appeared OR if asymptomatic patient or date of symptom onset is unclear then use 10 days donors must:    · Have had a confirmed diagnosis of COVID-19  · Be symptom-free for at least 14 days*    *Some people will be required to have a repeat COVID-19 test in order to be eligible to donate.  If you’re instructed by [facility] that a repeat test is r random. Researchers are trying to identify similarities between people with a Post-COVID condition to better understand if there are risk factors. How do I prevent a Post-COVID condition?   The best way to prevent the long-term symptoms of COVID-19 is